# Patient Record
Sex: MALE | Race: WHITE | Employment: FULL TIME | URBAN - METROPOLITAN AREA
[De-identification: names, ages, dates, MRNs, and addresses within clinical notes are randomized per-mention and may not be internally consistent; named-entity substitution may affect disease eponyms.]

---

## 2017-05-31 LAB
CHOLESTEROL, TOTAL: 287 MG/DL
CHOLESTEROL/HDL RATIO: NORMAL
HDLC SERPL-MCNC: 67 MG/DL (ref 35–70)
LDL CHOLESTEROL CALCULATED: 159 MG/DL (ref 0–160)
PROSTATE SPECIFIC ANTIGEN: 0.58 NG/ML
TRIGL SERPL-MCNC: 306 MG/DL
VLDLC SERPL CALC-MCNC: 61 MG/DL

## 2017-06-16 ENCOUNTER — OFFICE VISIT (OUTPATIENT)
Dept: FAMILY MEDICINE CLINIC | Age: 42
End: 2017-06-16

## 2017-06-16 VITALS
WEIGHT: 204.6 LBS | TEMPERATURE: 98.3 F | HEART RATE: 60 BPM | SYSTOLIC BLOOD PRESSURE: 136 MMHG | DIASTOLIC BLOOD PRESSURE: 84 MMHG | BODY MASS INDEX: 30.3 KG/M2 | RESPIRATION RATE: 18 BRPM | HEIGHT: 69 IN

## 2017-06-16 DIAGNOSIS — R00.2 PALPITATIONS: ICD-10-CM

## 2017-06-16 PROCEDURE — G8427 DOCREV CUR MEDS BY ELIG CLIN: HCPCS | Performed by: FAMILY MEDICINE

## 2017-06-16 PROCEDURE — 99213 OFFICE O/P EST LOW 20 MIN: CPT | Performed by: FAMILY MEDICINE

## 2017-06-16 PROCEDURE — 1036F TOBACCO NON-USER: CPT | Performed by: FAMILY MEDICINE

## 2017-06-16 PROCEDURE — G8417 CALC BMI ABV UP PARAM F/U: HCPCS | Performed by: FAMILY MEDICINE

## 2017-06-16 PROCEDURE — 93000 ELECTROCARDIOGRAM COMPLETE: CPT | Performed by: FAMILY MEDICINE

## 2017-06-16 ASSESSMENT — PATIENT HEALTH QUESTIONNAIRE - PHQ9
2. FEELING DOWN, DEPRESSED OR HOPELESS: 0
SUM OF ALL RESPONSES TO PHQ QUESTIONS 1-9: 0
SUM OF ALL RESPONSES TO PHQ9 QUESTIONS 1 & 2: 0
1. LITTLE INTEREST OR PLEASURE IN DOING THINGS: 0

## 2017-06-27 ASSESSMENT — ENCOUNTER SYMPTOMS
ABDOMINAL DISTENTION: 0
SORE THROAT: 0
CONSTIPATION: 0
DIARRHEA: 0
SINUS PRESSURE: 0
SHORTNESS OF BREATH: 0
EYE PAIN: 0
ABDOMINAL PAIN: 0
COUGH: 0
NAUSEA: 0
RHINORRHEA: 0

## 2017-11-22 ENCOUNTER — OFFICE VISIT (OUTPATIENT)
Dept: FAMILY MEDICINE CLINIC | Age: 42
End: 2017-11-22
Payer: COMMERCIAL

## 2017-11-22 VITALS
BODY MASS INDEX: 30.84 KG/M2 | HEART RATE: 92 BPM | DIASTOLIC BLOOD PRESSURE: 84 MMHG | WEIGHT: 208.2 LBS | HEIGHT: 69 IN | SYSTOLIC BLOOD PRESSURE: 134 MMHG | RESPIRATION RATE: 16 BRPM | TEMPERATURE: 99 F

## 2017-11-22 DIAGNOSIS — M77.11 LATERAL EPICONDYLITIS OF RIGHT ELBOW: Primary | ICD-10-CM

## 2017-11-22 PROCEDURE — 1036F TOBACCO NON-USER: CPT | Performed by: FAMILY MEDICINE

## 2017-11-22 PROCEDURE — 96372 THER/PROPH/DIAG INJ SC/IM: CPT | Performed by: FAMILY MEDICINE

## 2017-11-22 PROCEDURE — G8427 DOCREV CUR MEDS BY ELIG CLIN: HCPCS | Performed by: FAMILY MEDICINE

## 2017-11-22 PROCEDURE — 99213 OFFICE O/P EST LOW 20 MIN: CPT | Performed by: FAMILY MEDICINE

## 2017-11-22 PROCEDURE — G8417 CALC BMI ABV UP PARAM F/U: HCPCS | Performed by: FAMILY MEDICINE

## 2017-11-22 PROCEDURE — G8484 FLU IMMUNIZE NO ADMIN: HCPCS | Performed by: FAMILY MEDICINE

## 2017-11-22 RX ORDER — MELOXICAM 15 MG/1
15 TABLET ORAL DAILY
Qty: 30 TABLET | Refills: 1 | Status: SHIPPED | OUTPATIENT
Start: 2017-11-22 | End: 2018-12-18 | Stop reason: ALTCHOICE

## 2017-11-22 RX ORDER — METHYLPREDNISOLONE ACETATE 80 MG/ML
160 INJECTION, SUSPENSION INTRA-ARTICULAR; INTRALESIONAL; INTRAMUSCULAR; SOFT TISSUE ONCE
Status: COMPLETED | OUTPATIENT
Start: 2017-11-22 | End: 2017-11-22

## 2017-11-22 RX ADMIN — METHYLPREDNISOLONE ACETATE 160 MG: 80 INJECTION, SUSPENSION INTRA-ARTICULAR; INTRALESIONAL; INTRAMUSCULAR; SOFT TISSUE at 12:59

## 2017-11-27 ASSESSMENT — ENCOUNTER SYMPTOMS
NAUSEA: 0
DIARRHEA: 0
ABDOMINAL PAIN: 0
ABDOMINAL DISTENTION: 0
CONSTIPATION: 0
EYE PAIN: 0
SINUS PRESSURE: 0
RHINORRHEA: 0
SORE THROAT: 0
COUGH: 0
SHORTNESS OF BREATH: 0

## 2018-06-14 ENCOUNTER — TELEPHONE (OUTPATIENT)
Dept: FAMILY MEDICINE CLINIC | Age: 43
End: 2018-06-14

## 2018-06-14 DIAGNOSIS — L98.9 FACIAL LESION: Primary | ICD-10-CM

## 2018-12-17 ENCOUNTER — TELEPHONE (OUTPATIENT)
Dept: FAMILY MEDICINE CLINIC | Age: 43
End: 2018-12-17

## 2018-12-17 NOTE — TELEPHONE ENCOUNTER
12/17/18  Patient spouse Yasmine Alfredo (on hipaa), requesting appointment with Jean Carlos Johnson only for daily,  lt headaches x 2 weeks that goes up back of neck into forehead/ear. He does get some relief with Aleve. Declined Caitlyn 12/18/18. Please advise.    Thanks/elier  Dolv: 11/22/17

## 2018-12-18 ENCOUNTER — OFFICE VISIT (OUTPATIENT)
Dept: FAMILY MEDICINE CLINIC | Age: 43
End: 2018-12-18
Payer: COMMERCIAL

## 2018-12-18 VITALS
HEART RATE: 64 BPM | DIASTOLIC BLOOD PRESSURE: 100 MMHG | BODY MASS INDEX: 30.57 KG/M2 | WEIGHT: 207 LBS | SYSTOLIC BLOOD PRESSURE: 140 MMHG | RESPIRATION RATE: 12 BRPM | TEMPERATURE: 98.6 F

## 2018-12-18 DIAGNOSIS — I10 ESSENTIAL HYPERTENSION: Primary | ICD-10-CM

## 2018-12-18 PROCEDURE — G8417 CALC BMI ABV UP PARAM F/U: HCPCS | Performed by: FAMILY MEDICINE

## 2018-12-18 PROCEDURE — 99213 OFFICE O/P EST LOW 20 MIN: CPT | Performed by: FAMILY MEDICINE

## 2018-12-18 PROCEDURE — G8427 DOCREV CUR MEDS BY ELIG CLIN: HCPCS | Performed by: FAMILY MEDICINE

## 2018-12-18 PROCEDURE — 1036F TOBACCO NON-USER: CPT | Performed by: FAMILY MEDICINE

## 2018-12-18 PROCEDURE — G8484 FLU IMMUNIZE NO ADMIN: HCPCS | Performed by: FAMILY MEDICINE

## 2018-12-18 RX ORDER — LOSARTAN POTASSIUM AND HYDROCHLOROTHIAZIDE 12.5; 5 MG/1; MG/1
1 TABLET ORAL DAILY
Qty: 30 TABLET | Refills: 5 | Status: SHIPPED | OUTPATIENT
Start: 2018-12-18 | End: 2019-08-26 | Stop reason: SDUPTHER

## 2018-12-20 ASSESSMENT — ENCOUNTER SYMPTOMS
ABDOMINAL PAIN: 0
COUGH: 0
DIARRHEA: 0
RHINORRHEA: 0
ABDOMINAL DISTENTION: 0
NAUSEA: 0
CONSTIPATION: 0
SHORTNESS OF BREATH: 0
SINUS PRESSURE: 0
EYE PAIN: 0
SORE THROAT: 0

## 2018-12-20 NOTE — PROGRESS NOTES
mouth daily 30 tablet 5     No current facility-administered medications for this visit. No Known Allergies  Health Maintenance   Topic Date Due    HIV screen  03/31/1991    DTaP/Tdap/Td vaccine (1 - Tdap) 03/31/1995    Potassium monitoring  07/16/2012    Creatinine monitoring  07/16/2012    Flu vaccine (1) 09/01/2018    Lipid screen  05/31/2022         Objective:     Physical Exam   Constitutional: He is oriented to person, place, and time. He appears well-developed and well-nourished. No distress. HENT:   Head: Normocephalic and atraumatic. Right Ear: External ear normal.   Left Ear: External ear normal.   Eyes: Conjunctivae are normal.   Neck: No JVD present. Cardiovascular: Normal rate, regular rhythm and normal heart sounds. Pulmonary/Chest: Effort normal and breath sounds normal. He has no wheezes. He has no rales. Musculoskeletal: He exhibits no edema or tenderness. Neurological: He is alert and oriented to person, place, and time. Skin: Skin is warm and dry. He is not diaphoretic. No pallor. BP (!) 140/100 Comment: right  Pulse 64   Temp 98.6 °F (37 °C) (Oral)   Resp 12   Wt 207 lb (93.9 kg)   BMI 30.57 kg/m²       Impression/Plan:  1.  Essential hypertension      Requested Prescriptions     Signed Prescriptions Disp Refills    losartan-hydrochlorothiazide (HYZAAR) 50-12.5 MG per tablet 30 tablet 5     Sig: Take 1 tablet by mouth daily     Orders Placed This Encounter   Procedures    Lipid Panel     Standing Status:   Future     Standing Expiration Date:   12/18/2019     Order Specific Question:   Is Patient Fasting?/# of Hours     Answer:   yes/12    Comprehensive Metabolic Panel     Standing Status:   Future     Standing Expiration Date:   12/18/2019    CBC Auto Differential     Standing Status:   Future     Standing Expiration Date:   12/18/2019    TSH without Reflex     Standing Status:   Future     Standing Expiration Date:   12/18/2019    T4, Free     Standing

## 2018-12-27 ENCOUNTER — TELEPHONE (OUTPATIENT)
Dept: FAMILY MEDICINE CLINIC | Age: 43
End: 2018-12-27

## 2018-12-27 DIAGNOSIS — R51.9 ACUTE NONINTRACTABLE HEADACHE, UNSPECIFIED HEADACHE TYPE: Primary | ICD-10-CM

## 2018-12-27 DIAGNOSIS — H53.9 VISION CHANGES: ICD-10-CM

## 2018-12-29 LAB
ABSOLUTE BASO #: 0.1 K/UL (ref 0–0.1)
ABSOLUTE EOS #: 0 K/UL (ref 0.1–0.4)
ABSOLUTE LYMPH #: 1 K/UL (ref 0.8–5.2)
ABSOLUTE MONO #: 0.7 K/UL (ref 0.1–0.9)
ABSOLUTE NEUT #: 3.2 K/UL (ref 1.3–9.1)
ALBUMIN SERPL-MCNC: 5.1 G/DL (ref 3.5–5.2)
ALK PHOSPHATASE: 65 U/L (ref 39–118)
ALT SERPL-CCNC: 60 U/L (ref 5–50)
ANION GAP SERPL CALCULATED.3IONS-SCNC: 17 MEQ/L (ref 10–19)
AST SERPL-CCNC: 43 U/L (ref 9–50)
BASOPHILS RELATIVE PERCENT: 1 %
BILIRUB SERPL-MCNC: 1.3 MG/DL
BUN BLDV-MCNC: 13 MG/DL (ref 8–23)
CALCIUM SERPL-MCNC: 10.9 MG/DL (ref 8.5–10.5)
CHLORIDE BLD-SCNC: 92 MEQ/L (ref 95–107)
CHOLESTEROL/HDL RATIO: 5.2
CHOLESTEROL: 330 MG/DL
CO2: 24 MEQ/L (ref 19–31)
CREAT SERPL-MCNC: 1.1 MG/DL (ref 0.8–1.4)
EGFR AFRICAN AMERICAN: 95.5 ML/MIN/1.73 M2
EGFR IF NONAFRICAN AMERICAN: 82.4 ML/MIN/1.73 M2
EOSINOPHILS RELATIVE PERCENT: 0.8 %
GLUCOSE: 126 MG/DL (ref 70–99)
HCT VFR BLD CALC: 47.9 % (ref 41.4–51)
HDLC SERPL-MCNC: 62.9 MG/DL
HEMOGLOBIN: 17.3 G/DL (ref 13.8–17)
LDL CHOLESTEROL CALCULATED: 201 MG/DL
LDL/HDL RATIO: 3.2
LYMPHOCYTE %: 19.9 %
MCH RBC QN AUTO: 31.1 PG (ref 27–34)
MCHC RBC AUTO-ENTMCNC: 36.1 G/DL (ref 31–36)
MCV RBC AUTO: 86 FL (ref 80–100)
MONOCYTES # BLD: 14.1 %
NEUTROPHILS RELATIVE PERCENT: 64.2 %
PDW BLD-RTO: 12.6 % (ref 10.8–14.8)
PLATELETS: 267 K/UL (ref 150–450)
POTASSIUM SERPL-SCNC: 4.5 MEQ/L (ref 3.5–5.4)
RBC: 5.57 M/UL (ref 4–5.5)
SODIUM BLD-SCNC: 133 MEQ/L (ref 135–146)
T4 FREE: 1.17 NG/DL (ref 0.8–1.9)
TOTAL PROTEIN: 8.4 G/DL (ref 6.1–8.3)
TRIGL SERPL-MCNC: 329 MG/DL
TSH SERPL DL<=0.05 MIU/L-ACNC: 8.43 UIU/ML (ref 0.4–4.1)
VLDLC SERPL CALC-MCNC: 66 MG/DL
WBC: 5 K/UL (ref 3.7–10.8)

## 2019-01-03 ENCOUNTER — HOSPITAL ENCOUNTER (OUTPATIENT)
Dept: MRI IMAGING | Age: 44
Discharge: HOME | End: 2019-01-03
Payer: COMMERCIAL

## 2019-01-03 DIAGNOSIS — H53.9 VISION CHANGES: ICD-10-CM

## 2019-01-03 DIAGNOSIS — R51.9 ACUTE NONINTRACTABLE HEADACHE, UNSPECIFIED HEADACHE TYPE: ICD-10-CM

## 2019-01-03 PROCEDURE — 70551 MRI BRAIN STEM W/O DYE: CPT

## 2019-01-09 ENCOUNTER — TELEPHONE (OUTPATIENT)
Dept: FAMILY MEDICINE CLINIC | Age: 44
End: 2019-01-09

## 2019-01-09 DIAGNOSIS — E78.00 ELEVATED CHOLESTEROL: Primary | ICD-10-CM

## 2019-01-09 DIAGNOSIS — R79.89 ELEVATED TSH: ICD-10-CM

## 2019-01-10 RX ORDER — ATORVASTATIN CALCIUM 20 MG/1
20 TABLET, FILM COATED ORAL DAILY
Qty: 30 TABLET | Refills: 3 | Status: SHIPPED | OUTPATIENT
Start: 2019-01-10 | End: 2019-07-19 | Stop reason: DRUGHIGH

## 2019-01-10 RX ORDER — LEVOTHYROXINE SODIUM 0.05 MG/1
50 TABLET ORAL DAILY
Qty: 30 TABLET | Refills: 3 | Status: SHIPPED | OUTPATIENT
Start: 2019-01-10 | End: 2019-07-01 | Stop reason: SDUPTHER

## 2019-03-04 RX ORDER — AZITHROMYCIN 500 MG/1
500 TABLET, FILM COATED ORAL DAILY
Qty: 1 PACKET | Refills: 0 | Status: SHIPPED | OUTPATIENT
Start: 2019-03-04 | End: 2019-03-07

## 2019-07-01 DIAGNOSIS — E78.5 HYPERLIPIDEMIA, UNSPECIFIED HYPERLIPIDEMIA TYPE: ICD-10-CM

## 2019-07-01 DIAGNOSIS — R79.89 ELEVATED TSH: ICD-10-CM

## 2019-07-01 DIAGNOSIS — E03.9 HYPOTHYROIDISM, UNSPECIFIED TYPE: Primary | ICD-10-CM

## 2019-07-01 RX ORDER — LEVOTHYROXINE SODIUM 0.05 MG/1
50 TABLET ORAL DAILY
Qty: 30 TABLET | Refills: 3 | Status: SHIPPED | OUTPATIENT
Start: 2019-07-01 | End: 2019-08-26 | Stop reason: SDUPTHER

## 2019-07-02 RX ORDER — LEVOTHYROXINE SODIUM 0.05 MG/1
TABLET ORAL
Qty: 30 TABLET | Refills: 3 | OUTPATIENT
Start: 2019-07-02

## 2019-07-04 LAB
ALBUMIN SERPL-MCNC: 4.4 G/DL (ref 3.2–5.3)
ALK PHOSPHATASE: 48 U/L (ref 39–130)
ALT SERPL-CCNC: 113 U/L (ref 0–40)
ANION GAP SERPL CALCULATED.3IONS-SCNC: 8 MMOL/L (ref 4–12)
AST SERPL-CCNC: 66 U/L (ref 0–41)
BILIRUB SERPL-MCNC: 0.5 MG/DL (ref 0.3–1.2)
BUN BLDV-MCNC: 10 MG/DL (ref 5–23)
CALCIUM SERPL-MCNC: 10.1 MG/DL (ref 8.5–10.5)
CHLORIDE BLD-SCNC: 106 MMOL/L (ref 98–109)
CHOLESTEROL/HDL RATIO: 5.8 (ref 1–5)
CHOLESTEROL: 267 MG/DL (ref 150–200)
CO2: 28 MMOL/L (ref 22–32)
CREAT SERPL-MCNC: 0.92 MG/DL (ref 0.6–1.3)
EGFR AFRICAN AMERICAN: >60 ML/MIN/1.73SQ.M
EGFR IF NONAFRICAN AMERICAN: >60 ML/MIN/1.73SQ.M
GLUCOSE: 98 MG/DL (ref 65–99)
HDLC SERPL-MCNC: 46 MG/DL
LDL CHOLESTEROL CALCULATED: 152 MG/DL
LDL/HDL RATIO: 3.3
POTASSIUM SERPL-SCNC: 4.5 MMOL/L (ref 3.5–5)
SODIUM BLD-SCNC: 142 MMOL/L (ref 134–146)
T4 FREE: 0.67 NG/DL (ref 0.61–1.6)
TOTAL PROTEIN: 7.6 G/DL (ref 6–8)
TRIGL SERPL-MCNC: 346 MG/DL (ref 27–150)
TSH SERPL DL<=0.05 MIU/L-ACNC: 2.74 UIU/ML (ref 0.49–4.67)
VLDLC SERPL CALC-MCNC: 69 MG/DL (ref 0–30)

## 2019-07-15 ENCOUNTER — TELEPHONE (OUTPATIENT)
Dept: FAMILY MEDICINE CLINIC | Age: 44
End: 2019-07-15

## 2019-07-15 DIAGNOSIS — E78.00 ELEVATED CHOLESTEROL: Primary | ICD-10-CM

## 2019-07-19 RX ORDER — ATORVASTATIN CALCIUM 40 MG/1
40 TABLET, FILM COATED ORAL DAILY
Qty: 90 TABLET | Refills: 3 | Status: SHIPPED | OUTPATIENT
Start: 2019-07-19 | End: 2019-08-26 | Stop reason: SINTOL

## 2019-07-19 NOTE — TELEPHONE ENCOUNTER
VM left with results and recommendations. Med sent to [facility] in [address] and lab orders to his home.  Notified to call back with any questions or concerns

## 2019-08-26 ENCOUNTER — OFFICE VISIT (OUTPATIENT)
Dept: FAMILY MEDICINE CLINIC | Age: 44
End: 2019-08-26
Payer: COMMERCIAL

## 2019-08-26 VITALS
TEMPERATURE: 98.7 F | HEART RATE: 80 BPM | WEIGHT: 206 LBS | SYSTOLIC BLOOD PRESSURE: 160 MMHG | BODY MASS INDEX: 29.49 KG/M2 | DIASTOLIC BLOOD PRESSURE: 96 MMHG | RESPIRATION RATE: 16 BRPM | HEIGHT: 70 IN

## 2019-08-26 DIAGNOSIS — I10 ESSENTIAL HYPERTENSION: ICD-10-CM

## 2019-08-26 DIAGNOSIS — R79.89 ELEVATED TSH: ICD-10-CM

## 2019-08-26 DIAGNOSIS — M62.830 LUMBAR PARASPINAL MUSCLE SPASM: ICD-10-CM

## 2019-08-26 DIAGNOSIS — S39.012A ACUTE MYOFASCIAL STRAIN OF LUMBAR REGION, INITIAL ENCOUNTER: Primary | ICD-10-CM

## 2019-08-26 PROCEDURE — 1036F TOBACCO NON-USER: CPT | Performed by: FAMILY MEDICINE

## 2019-08-26 PROCEDURE — 96372 THER/PROPH/DIAG INJ SC/IM: CPT | Performed by: FAMILY MEDICINE

## 2019-08-26 PROCEDURE — G8427 DOCREV CUR MEDS BY ELIG CLIN: HCPCS | Performed by: FAMILY MEDICINE

## 2019-08-26 PROCEDURE — 99213 OFFICE O/P EST LOW 20 MIN: CPT | Performed by: FAMILY MEDICINE

## 2019-08-26 PROCEDURE — G8417 CALC BMI ABV UP PARAM F/U: HCPCS | Performed by: FAMILY MEDICINE

## 2019-08-26 RX ORDER — METHYLPREDNISOLONE ACETATE 80 MG/ML
160 INJECTION, SUSPENSION INTRA-ARTICULAR; INTRALESIONAL; INTRAMUSCULAR; SOFT TISSUE ONCE
Status: COMPLETED | OUTPATIENT
Start: 2019-08-26 | End: 2019-08-26

## 2019-08-26 RX ORDER — CYCLOBENZAPRINE HCL 10 MG
10 TABLET ORAL NIGHTLY PRN
Qty: 30 TABLET | Refills: 0 | Status: SHIPPED | OUTPATIENT
Start: 2019-08-26 | End: 2019-09-05

## 2019-08-26 RX ORDER — LEVOTHYROXINE SODIUM 0.05 MG/1
50 TABLET ORAL DAILY
Qty: 90 TABLET | Refills: 3 | Status: SHIPPED | OUTPATIENT
Start: 2019-08-26 | End: 2020-08-31

## 2019-08-26 RX ORDER — LOSARTAN POTASSIUM AND HYDROCHLOROTHIAZIDE 12.5; 5 MG/1; MG/1
1 TABLET ORAL DAILY
Qty: 90 TABLET | Refills: 3 | Status: SHIPPED | OUTPATIENT
Start: 2019-08-26 | End: 2020-09-03

## 2019-08-26 RX ADMIN — METHYLPREDNISOLONE ACETATE 160 MG: 80 INJECTION, SUSPENSION INTRA-ARTICULAR; INTRALESIONAL; INTRAMUSCULAR; SOFT TISSUE at 15:14

## 2019-08-26 ASSESSMENT — ENCOUNTER SYMPTOMS
SORE THROAT: 0
SHORTNESS OF BREATH: 0
NAUSEA: 0
BACK PAIN: 1
ABDOMINAL DISTENTION: 0
COUGH: 0
ABDOMINAL PAIN: 0
CONSTIPATION: 0
RHINORRHEA: 0
DIARRHEA: 0
EYE PAIN: 0
SINUS PRESSURE: 0

## 2019-08-26 ASSESSMENT — PATIENT HEALTH QUESTIONNAIRE - PHQ9
SUM OF ALL RESPONSES TO PHQ QUESTIONS 1-9: 0
SUM OF ALL RESPONSES TO PHQ9 QUESTIONS 1 & 2: 0
1. LITTLE INTEREST OR PLEASURE IN DOING THINGS: 0
SUM OF ALL RESPONSES TO PHQ QUESTIONS 1-9: 0
2. FEELING DOWN, DEPRESSED OR HOPELESS: 0

## 2019-08-26 NOTE — PROGRESS NOTES
Smoker    Smokeless tobacco: Never Used   Substance Use Topics    Alcohol use: Yes      Current Outpatient Medications   Medication Sig Dispense Refill    losartan-hydrochlorothiazide (HYZAAR) 50-12.5 MG per tablet Take 1 tablet by mouth daily 90 tablet 3    levothyroxine (SYNTHROID) 50 MCG tablet Take 1 tablet by mouth Daily 90 tablet 3    cyclobenzaprine (FLEXERIL) 10 MG tablet Take 1 tablet by mouth nightly as needed for Muscle spasms 30 tablet 0     No current facility-administered medications for this visit. No Known Allergies  Health Maintenance   Topic Date Due    HIV screen  03/31/1991    DTaP/Tdap/Td vaccine (1 - Tdap) 03/31/1995    Flu vaccine (1) 09/01/2019    Potassium monitoring  07/03/2020    Creatinine monitoring  07/03/2020    Lipid screen  07/03/2024    Pneumococcal 0-64 years Vaccine  Aged Out         Objective:     Physical Exam   Constitutional: He is oriented to person, place, and time. He appears well-developed and well-nourished. No distress. HENT:   Head: Normocephalic and atraumatic. Right Ear: External ear normal.   Left Ear: External ear normal.   Eyes: Conjunctivae are normal.   Neck: No JVD present. Cardiovascular: Normal rate, regular rhythm and normal heart sounds. Pulmonary/Chest: Effort normal and breath sounds normal. He has no wheezes. He has no rales. Musculoskeletal: He exhibits tenderness. He exhibits no edema. Decreased range of motion lumbar spine examination of the hips reveals full active and passive range of motion without significant discomfort   Neurological: He is alert and oriented to person, place, and time. Skin: Skin is warm and dry. He is not diaphoretic. No pallor. BP (!) 160/96 (Site: Left Upper Arm)   Pulse 80   Temp 98.7 °F (37.1 °C) (Oral)   Resp 16   Ht 5' 9.5\" (1.765 m)   Wt 206 lb (93.4 kg)   BMI 29.98 kg/m²       Impression/Plan:  1. Acute myofascial strain of lumbar region, initial encounter    2.  Essential hypertension    3. Elevated TSH    4. Lumbar paraspinal muscle spasm      Requested Prescriptions     Signed Prescriptions Disp Refills    losartan-hydrochlorothiazide (HYZAAR) 50-12.5 MG per tablet 90 tablet 3     Sig: Take 1 tablet by mouth daily    levothyroxine (SYNTHROID) 50 MCG tablet 90 tablet 3     Sig: Take 1 tablet by mouth Daily    cyclobenzaprine (FLEXERIL) 10 MG tablet 30 tablet 0     Sig: Take 1 tablet by mouth nightly as needed for Muscle spasms     No orders of the defined types were placed in this encounter. Methylprednisolone 160 IM. Counseled on need for compliance as he is been off his medications for some time    Patient giveneducational materials - see patient instructions. Discussed use, benefit, and side effects of prescribed medications. All patient questions answered. Pt voiced understanding. Reviewed health maintenance. Patient agreedwith treatment plan. Follow up as directed. **This report has been created using voice recognition software. It may contain minor errorswhich are inherent in voice recognition technology. **       Electronically signed by Jaja Moody MD on 8/26/2019 at 5:44 PM

## 2019-11-22 ENCOUNTER — APPOINTMENT (OUTPATIENT)
Dept: GENERAL RADIOLOGY | Age: 44
DRG: 246 | End: 2019-11-22
Payer: COMMERCIAL

## 2019-11-22 ENCOUNTER — HOSPITAL ENCOUNTER (INPATIENT)
Age: 44
LOS: 2 days | Discharge: HOME | DRG: 246 | End: 2019-11-24
Attending: EMERGENCY MEDICINE | Admitting: INTERNAL MEDICINE
Payer: COMMERCIAL

## 2019-11-22 DIAGNOSIS — I21.11 ACUTE ST ELEVATION MYOCARDIAL INFARCTION (STEMI) INVOLVING RIGHT CORONARY ARTERY (HCC): Primary | ICD-10-CM

## 2019-11-22 PROBLEM — I21.3 STEMI (ST ELEVATION MYOCARDIAL INFARCTION) (HCC): Status: ACTIVE | Noted: 2019-11-22

## 2019-11-22 LAB
ALBUMIN SERPL-MCNC: 4.7 G/DL (ref 3.5–5.1)
ALP BLD-CCNC: 57 U/L (ref 38–126)
ALT SERPL-CCNC: 60 U/L (ref 11–66)
AMPHETAMINE+METHAMPHETAMINE URINE SCREEN: NEGATIVE
ANION GAP SERPL CALCULATED.3IONS-SCNC: 20 MEQ/L (ref 8–16)
APTT: 37.3 SECONDS (ref 22–38)
AST SERPL-CCNC: 43 U/L (ref 5–40)
BARBITURATE QUANTITATIVE URINE: NEGATIVE
BENZODIAZEPINE QUANTITATIVE URINE: POSITIVE
BILIRUB SERPL-MCNC: 0.3 MG/DL (ref 0.3–1.2)
BUN BLDV-MCNC: 11 MG/DL (ref 7–22)
CALCIUM SERPL-MCNC: 10 MG/DL (ref 8.5–10.5)
CANNABINOID QUANTITATIVE URINE: NEGATIVE
CHLORIDE BLD-SCNC: 92 MEQ/L (ref 98–111)
CHOLESTEROL, TOTAL: 267 MG/DL (ref 100–199)
CO2: 22 MEQ/L (ref 23–33)
COCAINE METABOLITE QUANTITATIVE URINE: NEGATIVE
CREAT SERPL-MCNC: 0.7 MG/DL (ref 0.4–1.2)
EKG ATRIAL RATE: 112 BPM
EKG P AXIS: 50 DEGREES
EKG P-R INTERVAL: 198 MS
EKG Q-T INTERVAL: 334 MS
EKG QRS DURATION: 102 MS
EKG QTC CALCULATION (BAZETT): 455 MS
EKG R AXIS: 54 DEGREES
EKG T AXIS: 67 DEGREES
EKG VENTRICULAR RATE: 112 BPM
ERYTHROCYTE [DISTWIDTH] IN BLOOD BY AUTOMATED COUNT: 11.8 % (ref 11.5–14.5)
ERYTHROCYTE [DISTWIDTH] IN BLOOD BY AUTOMATED COUNT: 38.7 FL (ref 35–45)
GFR SERPL CREATININE-BSD FRML MDRD: > 90 ML/MIN/1.73M2
GLUCOSE BLD-MCNC: 112 MG/DL (ref 70–108)
HCT VFR BLD CALC: 44.3 % (ref 42–52)
HDLC SERPL-MCNC: 51 MG/DL
HEMOGLOBIN: 15.7 GM/DL (ref 14–18)
INR BLD: 0.92 (ref 0.85–1.13)
LDL CHOLESTEROL CALCULATED: 140 MG/DL
LIPASE: 25.5 U/L (ref 5.6–51.3)
LV EF: 58 %
LVEF MODALITY: NORMAL
MCH RBC QN AUTO: 31.8 PG (ref 26–33)
MCHC RBC AUTO-ENTMCNC: 35.4 GM/DL (ref 32.2–35.5)
MCV RBC AUTO: 89.9 FL (ref 80–94)
MRSA SCREEN RT-PCR: NEGATIVE
OPIATES, URINE: NEGATIVE
OSMOLALITY CALCULATION: 268.4 MOSMOL/KG (ref 275–300)
OXYCODONE: NEGATIVE
PHENCYCLIDINE QUANTITATIVE URINE: NEGATIVE
PLATELET # BLD: 235 THOU/MM3 (ref 130–400)
PMV BLD AUTO: 9.5 FL (ref 9.4–12.4)
POTASSIUM SERPL-SCNC: 3.8 MEQ/L (ref 3.5–5.2)
PRO-BNP: 7.2 PG/ML (ref 0–450)
RBC # BLD: 4.93 MILL/MM3 (ref 4.7–6.1)
SODIUM BLD-SCNC: 134 MEQ/L (ref 135–145)
TOTAL PROTEIN: 8 G/DL (ref 6.1–8)
TRIGL SERPL-MCNC: 380 MG/DL (ref 0–199)
TROPONIN T: 0.03 NG/ML
VANCOMYCIN RESISTANT ENTEROCOCCUS: NEGATIVE
WBC # BLD: 9.4 THOU/MM3 (ref 4.8–10.8)

## 2019-11-22 PROCEDURE — 99024 POSTOP FOLLOW-UP VISIT: CPT | Performed by: NURSE PRACTITIONER

## 2019-11-22 PROCEDURE — 93454 CORONARY ARTERY ANGIO S&I: CPT | Performed by: INTERNAL MEDICINE

## 2019-11-22 PROCEDURE — 92941 PRQ TRLML REVSC TOT OCCL AMI: CPT | Performed by: INTERNAL MEDICINE

## 2019-11-22 PROCEDURE — 85730 THROMBOPLASTIN TIME PARTIAL: CPT

## 2019-11-22 PROCEDURE — 2580000003 HC RX 258: Performed by: INTERNAL MEDICINE

## 2019-11-22 PROCEDURE — 6360000002 HC RX W HCPCS: Performed by: INTERNAL MEDICINE

## 2019-11-22 PROCEDURE — 2500000003 HC RX 250 WO HCPCS

## 2019-11-22 PROCEDURE — B2111ZZ FLUOROSCOPY OF MULTIPLE CORONARY ARTERIES USING LOW OSMOLAR CONTRAST: ICD-10-PCS | Performed by: INTERNAL MEDICINE

## 2019-11-22 PROCEDURE — 2500000003 HC RX 250 WO HCPCS: Performed by: EMERGENCY MEDICINE

## 2019-11-22 PROCEDURE — 87500 VANOMYCIN DNA AMP PROBE: CPT

## 2019-11-22 PROCEDURE — 80061 LIPID PANEL: CPT

## 2019-11-22 PROCEDURE — 92929 HC PRQ CARD STENT W/ANGIO ADDL: CPT | Performed by: INTERNAL MEDICINE

## 2019-11-22 PROCEDURE — 94761 N-INVAS EAR/PLS OXIMETRY MLT: CPT

## 2019-11-22 PROCEDURE — 6360000004 HC RX CONTRAST MEDICATION: Performed by: EMERGENCY MEDICINE

## 2019-11-22 PROCEDURE — C1725 CATH, TRANSLUMIN NON-LASER: HCPCS

## 2019-11-22 PROCEDURE — 83880 ASSAY OF NATRIURETIC PEPTIDE: CPT

## 2019-11-22 PROCEDURE — 84484 ASSAY OF TROPONIN QUANT: CPT

## 2019-11-22 PROCEDURE — 99285 EMERGENCY DEPT VISIT HI MDM: CPT

## 2019-11-22 PROCEDURE — C1894 INTRO/SHEATH, NON-LASER: HCPCS

## 2019-11-22 PROCEDURE — 87641 MR-STAPH DNA AMP PROBE: CPT

## 2019-11-22 PROCEDURE — 2709999900 HC NON-CHARGEABLE SUPPLY

## 2019-11-22 PROCEDURE — C1887 CATHETER, GUIDING: HCPCS

## 2019-11-22 PROCEDURE — 93005 ELECTROCARDIOGRAM TRACING: CPT | Performed by: EMERGENCY MEDICINE

## 2019-11-22 PROCEDURE — 6370000000 HC RX 637 (ALT 250 FOR IP): Performed by: INTERNAL MEDICINE

## 2019-11-22 PROCEDURE — 96375 TX/PRO/DX INJ NEW DRUG ADDON: CPT

## 2019-11-22 PROCEDURE — 6370000000 HC RX 637 (ALT 250 FOR IP)

## 2019-11-22 PROCEDURE — C1874 STENT, COATED/COV W/DEL SYS: HCPCS

## 2019-11-22 PROCEDURE — 4A023N7 MEASUREMENT OF CARDIAC SAMPLING AND PRESSURE, LEFT HEART, PERCUTANEOUS APPROACH: ICD-10-PCS | Performed by: INTERNAL MEDICINE

## 2019-11-22 PROCEDURE — 2500000003 HC RX 250 WO HCPCS: Performed by: INTERNAL MEDICINE

## 2019-11-22 PROCEDURE — 71045 X-RAY EXAM CHEST 1 VIEW: CPT

## 2019-11-22 PROCEDURE — 80307 DRUG TEST PRSMV CHEM ANLYZR: CPT

## 2019-11-22 PROCEDURE — C1769 GUIDE WIRE: HCPCS

## 2019-11-22 PROCEDURE — 96374 THER/PROPH/DIAG INJ IV PUSH: CPT

## 2019-11-22 PROCEDURE — 83690 ASSAY OF LIPASE: CPT

## 2019-11-22 PROCEDURE — 36415 COLL VENOUS BLD VENIPUNCTURE: CPT

## 2019-11-22 PROCEDURE — 85610 PROTHROMBIN TIME: CPT

## 2019-11-22 PROCEDURE — 2140000000 HC CCU INTERMEDIATE R&B

## 2019-11-22 PROCEDURE — 99291 CRITICAL CARE FIRST HOUR: CPT | Performed by: INTERNAL MEDICINE

## 2019-11-22 PROCEDURE — 93005 ELECTROCARDIOGRAM TRACING: CPT | Performed by: INTERNAL MEDICINE

## 2019-11-22 PROCEDURE — 6360000002 HC RX W HCPCS: Performed by: EMERGENCY MEDICINE

## 2019-11-22 PROCEDURE — 6370000000 HC RX 637 (ALT 250 FOR IP): Performed by: EMERGENCY MEDICINE

## 2019-11-22 PROCEDURE — 93306 TTE W/DOPPLER COMPLETE: CPT

## 2019-11-22 PROCEDURE — 6360000002 HC RX W HCPCS

## 2019-11-22 PROCEDURE — 87081 CULTURE SCREEN ONLY: CPT

## 2019-11-22 PROCEDURE — 80053 COMPREHEN METABOLIC PANEL: CPT

## 2019-11-22 PROCEDURE — 027036Z DILATION OF CORONARY ARTERY, ONE ARTERY WITH THREE DRUG-ELUTING INTRALUMINAL DEVICES, PERCUTANEOUS APPROACH: ICD-10-PCS | Performed by: INTERNAL MEDICINE

## 2019-11-22 PROCEDURE — 85027 COMPLETE CBC AUTOMATED: CPT

## 2019-11-22 RX ORDER — NITROGLYCERIN 20 MG/100ML
5 INJECTION INTRAVENOUS CONTINUOUS
Status: DISCONTINUED | OUTPATIENT
Start: 2019-11-22 | End: 2019-11-22 | Stop reason: DRUGHIGH

## 2019-11-22 RX ORDER — HEPARIN SODIUM 1000 [USP'U]/ML
4000 INJECTION, SOLUTION INTRAVENOUS; SUBCUTANEOUS ONCE
Status: COMPLETED | OUTPATIENT
Start: 2019-11-22 | End: 2019-11-22

## 2019-11-22 RX ORDER — ASPIRIN 81 MG/1
81 TABLET, CHEWABLE ORAL DAILY
Status: DISCONTINUED | OUTPATIENT
Start: 2019-11-23 | End: 2019-11-24 | Stop reason: HOSPADM

## 2019-11-22 RX ORDER — CARVEDILOL 3.12 MG/1
3.12 TABLET ORAL 2 TIMES DAILY WITH MEALS
Status: DISCONTINUED | OUTPATIENT
Start: 2019-11-22 | End: 2019-11-24 | Stop reason: HOSPADM

## 2019-11-22 RX ORDER — NITROGLYCERIN 20 MG/100ML
INJECTION INTRAVENOUS CONTINUOUS PRN
Status: COMPLETED | OUTPATIENT
Start: 2019-11-22 | End: 2019-11-22

## 2019-11-22 RX ORDER — NITROGLYCERIN 20 MG/100ML
5 INJECTION INTRAVENOUS CONTINUOUS
Status: DISCONTINUED | OUTPATIENT
Start: 2019-11-22 | End: 2019-11-22

## 2019-11-22 RX ORDER — MORPHINE SULFATE 2 MG/ML
2 INJECTION, SOLUTION INTRAMUSCULAR; INTRAVENOUS ONCE
Status: DISCONTINUED | OUTPATIENT
Start: 2019-11-22 | End: 2019-11-24 | Stop reason: HOSPADM

## 2019-11-22 RX ORDER — ATORVASTATIN CALCIUM 80 MG/1
80 TABLET, FILM COATED ORAL NIGHTLY
Status: DISCONTINUED | OUTPATIENT
Start: 2019-11-22 | End: 2019-11-24 | Stop reason: HOSPADM

## 2019-11-22 RX ORDER — NITROGLYCERIN 20 MG/100ML
5 INJECTION INTRAVENOUS CONTINUOUS
Status: DISCONTINUED | OUTPATIENT
Start: 2019-11-22 | End: 2019-11-24 | Stop reason: HOSPADM

## 2019-11-22 RX ORDER — HEPARIN SODIUM 1000 [USP'U]/ML
4000 INJECTION, SOLUTION INTRAVENOUS; SUBCUTANEOUS PRN
Status: DISCONTINUED | OUTPATIENT
Start: 2019-11-22 | End: 2019-11-22

## 2019-11-22 RX ORDER — SODIUM CHLORIDE 0.9 % (FLUSH) 0.9 %
10 SYRINGE (ML) INJECTION EVERY 12 HOURS SCHEDULED
Status: DISCONTINUED | OUTPATIENT
Start: 2019-11-22 | End: 2019-11-23 | Stop reason: SDUPTHER

## 2019-11-22 RX ORDER — SODIUM CHLORIDE 0.9 % (FLUSH) 0.9 %
10 SYRINGE (ML) INJECTION PRN
Status: DISCONTINUED | OUTPATIENT
Start: 2019-11-22 | End: 2019-11-23 | Stop reason: SDUPTHER

## 2019-11-22 RX ORDER — ACETAMINOPHEN 325 MG/1
650 TABLET ORAL EVERY 4 HOURS PRN
Status: DISCONTINUED | OUTPATIENT
Start: 2019-11-22 | End: 2019-11-23 | Stop reason: SDUPTHER

## 2019-11-22 RX ORDER — LISINOPRIL 5 MG/1
5 TABLET ORAL DAILY
Status: DISCONTINUED | OUTPATIENT
Start: 2019-11-22 | End: 2019-11-24 | Stop reason: HOSPADM

## 2019-11-22 RX ORDER — ALPRAZOLAM 0.25 MG/1
0.25 TABLET ORAL EVERY 6 HOURS PRN
Status: DISCONTINUED | OUTPATIENT
Start: 2019-11-22 | End: 2019-11-24 | Stop reason: HOSPADM

## 2019-11-22 RX ORDER — HYDRALAZINE HYDROCHLORIDE 20 MG/ML
10 INJECTION INTRAMUSCULAR; INTRAVENOUS EVERY 10 MIN PRN
Status: DISCONTINUED | OUTPATIENT
Start: 2019-11-22 | End: 2019-11-24 | Stop reason: HOSPADM

## 2019-11-22 RX ORDER — HEPARIN SODIUM 1000 [USP'U]/ML
2000 INJECTION, SOLUTION INTRAVENOUS; SUBCUTANEOUS PRN
Status: DISCONTINUED | OUTPATIENT
Start: 2019-11-22 | End: 2019-11-22

## 2019-11-22 RX ORDER — METOPROLOL TARTRATE 5 MG/5ML
5 INJECTION INTRAVENOUS ONCE
Status: COMPLETED | OUTPATIENT
Start: 2019-11-22 | End: 2019-11-22

## 2019-11-22 RX ORDER — ASPIRIN 81 MG/1
324 TABLET, CHEWABLE ORAL ONCE
Status: DISCONTINUED | OUTPATIENT
Start: 2019-11-22 | End: 2019-11-24 | Stop reason: HOSPADM

## 2019-11-22 RX ORDER — MORPHINE SULFATE 2 MG/ML
2 INJECTION, SOLUTION INTRAMUSCULAR; INTRAVENOUS
Status: DISCONTINUED | OUTPATIENT
Start: 2019-11-22 | End: 2019-11-24 | Stop reason: HOSPADM

## 2019-11-22 RX ORDER — HEPARIN SODIUM 10000 [USP'U]/100ML
11 INJECTION, SOLUTION INTRAVENOUS CONTINUOUS
Status: DISCONTINUED | OUTPATIENT
Start: 2019-11-22 | End: 2019-11-22

## 2019-11-22 RX ORDER — ASPIRIN 81 MG/1
TABLET, CHEWABLE ORAL DAILY PRN
Status: COMPLETED | OUTPATIENT
Start: 2019-11-22 | End: 2019-11-22

## 2019-11-22 RX ORDER — MORPHINE SULFATE 2 MG/ML
INJECTION, SOLUTION INTRAMUSCULAR; INTRAVENOUS DAILY PRN
Status: COMPLETED | OUTPATIENT
Start: 2019-11-22 | End: 2019-11-22

## 2019-11-22 RX ADMIN — CARVEDILOL 3.12 MG: 3.12 TABLET, FILM COATED ORAL at 08:51

## 2019-11-22 RX ADMIN — ACETAMINOPHEN 650 MG: 325 TABLET ORAL at 09:46

## 2019-11-22 RX ADMIN — HEPARIN SODIUM 1000 UNITS/HR: 10000 INJECTION, SOLUTION INTRAVENOUS at 05:09

## 2019-11-22 RX ADMIN — SODIUM CHLORIDE, PRESERVATIVE FREE 10 ML: 5 INJECTION INTRAVENOUS at 20:06

## 2019-11-22 RX ADMIN — ATORVASTATIN CALCIUM 80 MG: 80 TABLET, FILM COATED ORAL at 20:06

## 2019-11-22 RX ADMIN — HEPARIN SODIUM 4000 UNITS: 1000 INJECTION INTRAVENOUS; SUBCUTANEOUS at 05:08

## 2019-11-22 RX ADMIN — ASPIRIN 324 MG: 81 TABLET, CHEWABLE ORAL at 05:03

## 2019-11-22 RX ADMIN — SODIUM CHLORIDE, PRESERVATIVE FREE 10 ML: 5 INJECTION INTRAVENOUS at 08:00

## 2019-11-22 RX ADMIN — CARVEDILOL 3.12 MG: 3.12 TABLET, FILM COATED ORAL at 18:33

## 2019-11-22 RX ADMIN — METOPROLOL TARTRATE 5 MG: 5 INJECTION INTRAVENOUS at 05:12

## 2019-11-22 RX ADMIN — ACETAMINOPHEN 650 MG: 325 TABLET ORAL at 18:33

## 2019-11-22 RX ADMIN — NITROGLYCERIN 5 MCG/MIN: 20 INJECTION INTRAVENOUS at 05:02

## 2019-11-22 RX ADMIN — MORPHINE SULFATE 2 MG: 2 INJECTION, SOLUTION INTRAMUSCULAR; INTRAVENOUS at 14:36

## 2019-11-22 RX ADMIN — TICAGRELOR 90 MG: 90 TABLET ORAL at 20:06

## 2019-11-22 RX ADMIN — IOPAMIDOL 100 ML: 755 INJECTION, SOLUTION INTRAVENOUS at 06:42

## 2019-11-22 RX ADMIN — ALPRAZOLAM 0.25 MG: 0.25 TABLET ORAL at 09:46

## 2019-11-22 RX ADMIN — MORPHINE SULFATE 2 MG: 2 INJECTION, SOLUTION INTRAMUSCULAR; INTRAVENOUS at 05:05

## 2019-11-22 RX ADMIN — NITROGLYCERIN 5 MCG/MIN: 20 INJECTION INTRAVENOUS at 14:35

## 2019-11-22 RX ADMIN — LISINOPRIL 5 MG: 5 TABLET ORAL at 14:15

## 2019-11-22 ASSESSMENT — ENCOUNTER SYMPTOMS
VOMITING: 0
BACK PAIN: 0
PHOTOPHOBIA: 0
RHINORRHEA: 0
ABDOMINAL PAIN: 0
WHEEZING: 0
ABDOMINAL DISTENTION: 0
CHEST TIGHTNESS: 0
EYE PAIN: 0
EYE ITCHING: 0
SORE THROAT: 0
EYE REDNESS: 0
DIARRHEA: 0
STRIDOR: 0
EYE DISCHARGE: 0
CONSTIPATION: 0
NAUSEA: 0
SHORTNESS OF BREATH: 0
COUGH: 0

## 2019-11-22 ASSESSMENT — PAIN DESCRIPTION - ONSET: ONSET: ON-GOING

## 2019-11-22 ASSESSMENT — PAIN SCALES - GENERAL
PAINLEVEL_OUTOF10: 6
PAINLEVEL_OUTOF10: 3
PAINLEVEL_OUTOF10: 0
PAINLEVEL_OUTOF10: 7
PAINLEVEL_OUTOF10: 2
PAINLEVEL_OUTOF10: 6

## 2019-11-22 ASSESSMENT — PAIN - FUNCTIONAL ASSESSMENT: PAIN_FUNCTIONAL_ASSESSMENT: ACTIVITIES ARE NOT PREVENTED

## 2019-11-22 ASSESSMENT — PAIN DESCRIPTION - LOCATION
LOCATION: HEAD
LOCATION: HEAD

## 2019-11-22 ASSESSMENT — PAIN DESCRIPTION - PAIN TYPE
TYPE: ACUTE PAIN

## 2019-11-22 ASSESSMENT — PAIN DESCRIPTION - ORIENTATION
ORIENTATION: LEFT
ORIENTATION: LOWER

## 2019-11-22 ASSESSMENT — PAIN DESCRIPTION - FREQUENCY: FREQUENCY: CONTINUOUS

## 2019-11-22 ASSESSMENT — PAIN DESCRIPTION - DESCRIPTORS: DESCRIPTORS: ACHING;DULL;HEADACHE

## 2019-11-22 ASSESSMENT — PAIN DESCRIPTION - PROGRESSION: CLINICAL_PROGRESSION: GRADUALLY IMPROVING

## 2019-11-23 ENCOUNTER — APPOINTMENT (OUTPATIENT)
Dept: CARDIAC CATH/INVASIVE PROCEDURES | Age: 44
DRG: 246 | End: 2019-11-23
Payer: COMMERCIAL

## 2019-11-23 LAB
ACTIVATED CLOTTING TIME: 433 SECONDS (ref 1–150)
EKG ATRIAL RATE: 70 BPM
EKG P AXIS: 37 DEGREES
EKG P-R INTERVAL: 206 MS
EKG Q-T INTERVAL: 398 MS
EKG QRS DURATION: 94 MS
EKG QTC CALCULATION (BAZETT): 429 MS
EKG R AXIS: 19 DEGREES
EKG T AXIS: 15 DEGREES
EKG VENTRICULAR RATE: 70 BPM

## 2019-11-23 PROCEDURE — 2140000000 HC CCU INTERMEDIATE R&B

## 2019-11-23 PROCEDURE — 92928 PRQ TCAT PLMT NTRAC ST 1 LES: CPT | Performed by: INTERNAL MEDICINE

## 2019-11-23 PROCEDURE — 6370000000 HC RX 637 (ALT 250 FOR IP): Performed by: INTERNAL MEDICINE

## 2019-11-23 PROCEDURE — 027034Z DILATION OF CORONARY ARTERY, ONE ARTERY WITH DRUG-ELUTING INTRALUMINAL DEVICE, PERCUTANEOUS APPROACH: ICD-10-PCS | Performed by: INTERNAL MEDICINE

## 2019-11-23 PROCEDURE — 2580000003 HC RX 258: Performed by: INTERNAL MEDICINE

## 2019-11-23 PROCEDURE — 2500000003 HC RX 250 WO HCPCS

## 2019-11-23 PROCEDURE — 6360000004 HC RX CONTRAST MEDICATION: Performed by: INTERNAL MEDICINE

## 2019-11-23 PROCEDURE — C1725 CATH, TRANSLUMIN NON-LASER: HCPCS

## 2019-11-23 PROCEDURE — C1769 GUIDE WIRE: HCPCS

## 2019-11-23 PROCEDURE — 85347 COAGULATION TIME ACTIVATED: CPT

## 2019-11-23 PROCEDURE — 6360000002 HC RX W HCPCS

## 2019-11-23 PROCEDURE — 2709999900 HC NON-CHARGEABLE SUPPLY

## 2019-11-23 PROCEDURE — C1894 INTRO/SHEATH, NON-LASER: HCPCS

## 2019-11-23 PROCEDURE — C1874 STENT, COATED/COV W/DEL SYS: HCPCS

## 2019-11-23 RX ORDER — ACETAMINOPHEN 325 MG/1
650 TABLET ORAL EVERY 4 HOURS PRN
Status: DISCONTINUED | OUTPATIENT
Start: 2019-11-23 | End: 2019-11-24 | Stop reason: HOSPADM

## 2019-11-23 RX ORDER — SODIUM CHLORIDE 0.9 % (FLUSH) 0.9 %
10 SYRINGE (ML) INJECTION EVERY 12 HOURS SCHEDULED
Status: DISCONTINUED | OUTPATIENT
Start: 2019-11-23 | End: 2019-11-24 | Stop reason: HOSPADM

## 2019-11-23 RX ORDER — SODIUM CHLORIDE 0.9 % (FLUSH) 0.9 %
10 SYRINGE (ML) INJECTION PRN
Status: DISCONTINUED | OUTPATIENT
Start: 2019-11-23 | End: 2019-11-24 | Stop reason: HOSPADM

## 2019-11-23 RX ADMIN — CARVEDILOL 3.12 MG: 3.12 TABLET, FILM COATED ORAL at 09:49

## 2019-11-23 RX ADMIN — TICAGRELOR 90 MG: 90 TABLET ORAL at 07:45

## 2019-11-23 RX ADMIN — ASPIRIN 81 MG 81 MG: 81 TABLET ORAL at 07:51

## 2019-11-23 RX ADMIN — LISINOPRIL 5 MG: 5 TABLET ORAL at 09:49

## 2019-11-23 RX ADMIN — TICAGRELOR 90 MG: 90 TABLET ORAL at 20:28

## 2019-11-23 RX ADMIN — CARVEDILOL 3.12 MG: 3.12 TABLET, FILM COATED ORAL at 16:20

## 2019-11-23 RX ADMIN — ATORVASTATIN CALCIUM 80 MG: 80 TABLET, FILM COATED ORAL at 20:28

## 2019-11-23 RX ADMIN — SODIUM CHLORIDE, PRESERVATIVE FREE 10 ML: 5 INJECTION INTRAVENOUS at 20:29

## 2019-11-23 RX ADMIN — IOPAMIDOL 80 ML: 755 INJECTION, SOLUTION INTRAVENOUS at 08:41

## 2019-11-23 ASSESSMENT — PAIN SCALES - GENERAL
PAINLEVEL_OUTOF10: 0

## 2019-11-24 VITALS
RESPIRATION RATE: 18 BRPM | DIASTOLIC BLOOD PRESSURE: 76 MMHG | WEIGHT: 199.3 LBS | TEMPERATURE: 99.1 F | SYSTOLIC BLOOD PRESSURE: 120 MMHG | OXYGEN SATURATION: 98 % | HEART RATE: 79 BPM | BODY MASS INDEX: 29.52 KG/M2 | HEIGHT: 69 IN

## 2019-11-24 LAB — MRSA SCREEN: NORMAL

## 2019-11-24 PROCEDURE — 99238 HOSP IP/OBS DSCHRG MGMT 30/<: CPT | Performed by: NURSE PRACTITIONER

## 2019-11-24 PROCEDURE — 94760 N-INVAS EAR/PLS OXIMETRY 1: CPT

## 2019-11-24 PROCEDURE — 2580000003 HC RX 258: Performed by: INTERNAL MEDICINE

## 2019-11-24 PROCEDURE — 6370000000 HC RX 637 (ALT 250 FOR IP): Performed by: INTERNAL MEDICINE

## 2019-11-24 RX ORDER — ATORVASTATIN CALCIUM 80 MG/1
80 TABLET, FILM COATED ORAL NIGHTLY
Qty: 90 TABLET | Refills: 3 | Status: SHIPPED | OUTPATIENT
Start: 2019-11-24 | End: 2020-04-22

## 2019-11-24 RX ORDER — NITROGLYCERIN 0.4 MG/1
0.4 TABLET SUBLINGUAL EVERY 5 MIN PRN
Qty: 25 TABLET | Refills: 3 | Status: SHIPPED | OUTPATIENT
Start: 2019-11-24

## 2019-11-24 RX ORDER — ASPIRIN 81 MG/1
81 TABLET ORAL DAILY
Qty: 90 TABLET | Refills: 1 | Status: ON HOLD
Start: 2019-11-24 | End: 2021-06-09 | Stop reason: HOSPADM

## 2019-11-24 RX ORDER — CARVEDILOL 3.12 MG/1
3.12 TABLET ORAL 2 TIMES DAILY WITH MEALS
Qty: 180 TABLET | Refills: 3 | Status: SHIPPED | OUTPATIENT
Start: 2019-11-24 | End: 2020-10-22 | Stop reason: DRUGHIGH

## 2019-11-24 RX ADMIN — ASPIRIN 81 MG 81 MG: 81 TABLET ORAL at 09:06

## 2019-11-24 RX ADMIN — LISINOPRIL 5 MG: 5 TABLET ORAL at 09:05

## 2019-11-24 RX ADMIN — SODIUM CHLORIDE, PRESERVATIVE FREE 10 ML: 5 INJECTION INTRAVENOUS at 09:07

## 2019-11-24 RX ADMIN — TICAGRELOR 90 MG: 90 TABLET ORAL at 09:05

## 2019-11-24 RX ADMIN — CARVEDILOL 3.12 MG: 3.12 TABLET, FILM COATED ORAL at 09:05

## 2019-11-24 ASSESSMENT — PAIN SCALES - GENERAL
PAINLEVEL_OUTOF10: 0
PAINLEVEL_OUTOF10: 0

## 2019-11-25 ENCOUNTER — TELEPHONE (OUTPATIENT)
Dept: FAMILY MEDICINE CLINIC | Age: 44
End: 2019-11-25

## 2019-12-04 ENCOUNTER — OFFICE VISIT (OUTPATIENT)
Dept: FAMILY MEDICINE CLINIC | Age: 44
End: 2019-12-04
Payer: COMMERCIAL

## 2019-12-04 VITALS
HEART RATE: 92 BPM | DIASTOLIC BLOOD PRESSURE: 84 MMHG | WEIGHT: 198.4 LBS | BODY MASS INDEX: 29.3 KG/M2 | RESPIRATION RATE: 14 BRPM | SYSTOLIC BLOOD PRESSURE: 110 MMHG | TEMPERATURE: 98.9 F

## 2019-12-04 DIAGNOSIS — I25.110 CORONARY ARTERY DISEASE INVOLVING NATIVE CORONARY ARTERY OF NATIVE HEART WITH UNSTABLE ANGINA PECTORIS (HCC): Primary | ICD-10-CM

## 2019-12-04 DIAGNOSIS — E03.9 HYPOTHYROIDISM, UNSPECIFIED TYPE: ICD-10-CM

## 2019-12-04 DIAGNOSIS — F51.02 ADJUSTMENT INSOMNIA: ICD-10-CM

## 2019-12-04 DIAGNOSIS — E78.5 HYPERLIPIDEMIA, UNSPECIFIED HYPERLIPIDEMIA TYPE: ICD-10-CM

## 2019-12-04 PROCEDURE — 1111F DSCHRG MED/CURRENT MED MERGE: CPT | Performed by: FAMILY MEDICINE

## 2019-12-04 PROCEDURE — 99495 TRANSJ CARE MGMT MOD F2F 14D: CPT | Performed by: FAMILY MEDICINE

## 2019-12-04 RX ORDER — CLONAZEPAM 0.5 MG/1
0.5 TABLET ORAL NIGHTLY PRN
Qty: 14 TABLET | Refills: 0 | Status: SHIPPED | OUTPATIENT
Start: 2019-12-04 | End: 2021-01-04

## 2019-12-31 ENCOUNTER — OFFICE VISIT (OUTPATIENT)
Dept: CARDIOLOGY CLINIC | Age: 44
End: 2019-12-31
Payer: COMMERCIAL

## 2019-12-31 VITALS
BODY MASS INDEX: 29.44 KG/M2 | SYSTOLIC BLOOD PRESSURE: 135 MMHG | HEART RATE: 87 BPM | DIASTOLIC BLOOD PRESSURE: 98 MMHG | WEIGHT: 198.8 LBS | HEIGHT: 69 IN

## 2019-12-31 DIAGNOSIS — I25.10 CORONARY ARTERY DISEASE INVOLVING NATIVE CORONARY ARTERY OF NATIVE HEART WITHOUT ANGINA PECTORIS: ICD-10-CM

## 2019-12-31 DIAGNOSIS — E78.00 PURE HYPERCHOLESTEROLEMIA: ICD-10-CM

## 2019-12-31 DIAGNOSIS — Z95.9 S/P ARTERIAL STENT: Primary | ICD-10-CM

## 2019-12-31 PROCEDURE — 93000 ELECTROCARDIOGRAM COMPLETE: CPT | Performed by: PHYSICIAN ASSISTANT

## 2019-12-31 PROCEDURE — G8484 FLU IMMUNIZE NO ADMIN: HCPCS | Performed by: PHYSICIAN ASSISTANT

## 2019-12-31 PROCEDURE — 1036F TOBACCO NON-USER: CPT | Performed by: PHYSICIAN ASSISTANT

## 2019-12-31 PROCEDURE — 99214 OFFICE O/P EST MOD 30 MIN: CPT | Performed by: PHYSICIAN ASSISTANT

## 2019-12-31 PROCEDURE — G8417 CALC BMI ABV UP PARAM F/U: HCPCS | Performed by: PHYSICIAN ASSISTANT

## 2019-12-31 PROCEDURE — G8427 DOCREV CUR MEDS BY ELIG CLIN: HCPCS | Performed by: PHYSICIAN ASSISTANT

## 2019-12-31 PROCEDURE — G8598 ASA/ANTIPLAT THER USED: HCPCS | Performed by: PHYSICIAN ASSISTANT

## 2019-12-31 RX ORDER — VITAMIN B COMPLEX
100 TABLET ORAL DAILY
Qty: 30 CAPSULE | Refills: 3 | Status: SHIPPED | OUTPATIENT
Start: 2019-12-31 | End: 2020-03-23 | Stop reason: SDUPTHER

## 2020-02-06 RX ORDER — AMOXICILLIN AND CLAVULANATE POTASSIUM 875; 125 MG/1; MG/1
1 TABLET, FILM COATED ORAL 2 TIMES DAILY
Qty: 20 TABLET | Refills: 0 | Status: SHIPPED | OUTPATIENT
Start: 2020-02-06 | End: 2020-02-16

## 2020-02-12 ENCOUNTER — APPOINTMENT (OUTPATIENT)
Dept: CT IMAGING | Age: 45
End: 2020-02-12
Payer: OTHER MISCELLANEOUS

## 2020-02-12 ENCOUNTER — APPOINTMENT (OUTPATIENT)
Dept: GENERAL RADIOLOGY | Age: 45
End: 2020-02-12
Payer: OTHER MISCELLANEOUS

## 2020-02-12 ENCOUNTER — HOSPITAL ENCOUNTER (EMERGENCY)
Age: 45
Discharge: HOME | End: 2020-02-12
Attending: EMERGENCY MEDICINE
Payer: OTHER MISCELLANEOUS

## 2020-02-12 VITALS
BODY MASS INDEX: 29.62 KG/M2 | RESPIRATION RATE: 18 BRPM | DIASTOLIC BLOOD PRESSURE: 69 MMHG | HEIGHT: 69 IN | WEIGHT: 200 LBS | SYSTOLIC BLOOD PRESSURE: 97 MMHG | HEART RATE: 78 BPM | OXYGEN SATURATION: 99 % | TEMPERATURE: 97.7 F

## 2020-02-12 LAB
ABO: NORMAL
ALBUMIN SERPL-MCNC: 4.3 G/DL (ref 3.5–5.1)
ALP BLD-CCNC: 63 U/L (ref 38–126)
ALT SERPL-CCNC: 21 U/L (ref 11–66)
ANGLE TEG: 74.8 DEG (ref 53–72)
ANION GAP SERPL CALCULATED.3IONS-SCNC: 16 MEQ/L (ref 8–16)
ANTIBODY SCREEN: NORMAL
AST SERPL-CCNC: 20 U/L (ref 5–40)
BASOPHILS # BLD: 0.7 %
BASOPHILS ABSOLUTE: 0 THOU/MM3 (ref 0–0.1)
BILIRUB SERPL-MCNC: 1.5 MG/DL (ref 0.3–1.2)
BILIRUBIN DIRECT: 0.3 MG/DL (ref 0–0.3)
BUN BLDV-MCNC: 23 MG/DL (ref 7–22)
CALCIUM SERPL-MCNC: 9.8 MG/DL (ref 8.5–10.5)
CHLORIDE BLD-SCNC: 96 MEQ/L (ref 98–111)
CO2: 25 MEQ/L (ref 23–33)
CREAT SERPL-MCNC: 1.4 MG/DL (ref 0.4–1.2)
EKG ATRIAL RATE: 78 BPM
EKG P AXIS: 21 DEGREES
EKG P-R INTERVAL: 206 MS
EKG Q-T INTERVAL: 406 MS
EKG QRS DURATION: 92 MS
EKG QTC CALCULATION (BAZETT): 462 MS
EKG R AXIS: 9 DEGREES
EKG T AXIS: -16 DEGREES
EKG VENTRICULAR RATE: 78 BPM
EOSINOPHIL # BLD: 1.2 %
EOSINOPHILS ABSOLUTE: 0.1 THOU/MM3 (ref 0–0.4)
EPL-TEG: 1.2 %
ERYTHROCYTE [DISTWIDTH] IN BLOOD BY AUTOMATED COUNT: 12.6 % (ref 11.5–14.5)
ERYTHROCYTE [DISTWIDTH] IN BLOOD BY AUTOMATED COUNT: 42.1 FL (ref 35–45)
GFR SERPL CREATININE-BSD FRML MDRD: 55 ML/MIN/1.73M2
GLUCOSE BLD-MCNC: 105 MG/DL (ref 70–108)
HCT VFR BLD CALC: 30.1 % (ref 42–52)
HEMOCCULT STL QL: NEGATIVE
HEMOGLOBIN: 10.3 GM/DL (ref 14–18)
HEPARIN THERAPY: NO
IMMATURE GRANS (ABS): 0.04 THOU/MM3 (ref 0–0.07)
IMMATURE GRANULOCYTES: 0.7 %
INHIBITION AA TEG: 97.4 %
INHIBITION ADP TEG: 96.6 %
KINETICS TEG: 1.1 MINUTES (ref 1–3)
LIPASE: 32.4 U/L (ref 5.6–51.3)
LY30 (LYSIS) TEG: 1.2 % (ref 0–7.5)
LYMPHOCYTES # BLD: 12.2 %
LYMPHOCYTES ABSOLUTE: 0.7 THOU/MM3 (ref 1–4.8)
MA (MAX CLOT) TEG: 73 MM (ref 50–70)
MA(AA) TEG: 13.4 MM
MA(ACTIVATED) TEG: 11.8 MM
MA(ADP) TEG: 13.9 MM
MAGNESIUM: 2 MG/DL (ref 1.6–2.4)
MCH RBC QN AUTO: 31.8 PG (ref 26–33)
MCHC RBC AUTO-ENTMCNC: 34.2 GM/DL (ref 32.2–35.5)
MCV RBC AUTO: 92.9 FL (ref 80–94)
MONOCYTES # BLD: 12.9 %
MONOCYTES ABSOLUTE: 0.8 THOU/MM3 (ref 0.4–1.3)
NUCLEATED RED BLOOD CELLS: 0 /100 WBC
OSMOLALITY CALCULATION: 277.9 MOSMOL/KG (ref 275–300)
PLATELET # BLD: 275 THOU/MM3 (ref 130–400)
PMV BLD AUTO: 10.3 FL (ref 9.4–12.4)
POTASSIUM SERPL-SCNC: 4 MEQ/L (ref 3.5–5.2)
RBC # BLD: 3.24 MILL/MM3 (ref 4.7–6.1)
REACTION TIME TEG: 7.9 MINUTES (ref 5–10)
RH FACTOR: NORMAL
SEG NEUTROPHILS: 72.3 %
SEGMENTED NEUTROPHILS ABSOLUTE COUNT: 4.3 THOU/MM3 (ref 1.8–7.7)
SODIUM BLD-SCNC: 137 MEQ/L (ref 135–145)
TOTAL CK: 235 U/L (ref 55–170)
TOTAL PROTEIN: 8 G/DL (ref 6.1–8)
WBC # BLD: 6 THOU/MM3 (ref 4.8–10.8)

## 2020-02-12 PROCEDURE — 93005 ELECTROCARDIOGRAM TRACING: CPT | Performed by: PHYSICIAN ASSISTANT

## 2020-02-12 PROCEDURE — 99285 EMERGENCY DEPT VISIT HI MDM: CPT

## 2020-02-12 PROCEDURE — 82272 OCCULT BLD FECES 1-3 TESTS: CPT

## 2020-02-12 PROCEDURE — 73564 X-RAY EXAM KNEE 4 OR MORE: CPT

## 2020-02-12 PROCEDURE — 86900 BLOOD TYPING SEROLOGIC ABO: CPT

## 2020-02-12 PROCEDURE — 76376 3D RENDER W/INTRP POSTPROCES: CPT

## 2020-02-12 PROCEDURE — 71260 CT THORAX DX C+: CPT

## 2020-02-12 PROCEDURE — 70450 CT HEAD/BRAIN W/O DYE: CPT

## 2020-02-12 PROCEDURE — 73130 X-RAY EXAM OF HAND: CPT

## 2020-02-12 PROCEDURE — APPSS180 APP SPLIT SHARED TIME > 60 MINUTES: Performed by: PHYSICIAN ASSISTANT

## 2020-02-12 PROCEDURE — 80053 COMPREHEN METABOLIC PANEL: CPT

## 2020-02-12 PROCEDURE — 82550 ASSAY OF CK (CPK): CPT

## 2020-02-12 PROCEDURE — 2580000003 HC RX 258: Performed by: PHYSICIAN ASSISTANT

## 2020-02-12 PROCEDURE — 86901 BLOOD TYPING SEROLOGIC RH(D): CPT

## 2020-02-12 PROCEDURE — 36415 COLL VENOUS BLD VENIPUNCTURE: CPT

## 2020-02-12 PROCEDURE — 99282 EMERGENCY DEPT VISIT SF MDM: CPT | Performed by: SURGERY

## 2020-02-12 PROCEDURE — 74177 CT ABD & PELVIS W/CONTRAST: CPT

## 2020-02-12 PROCEDURE — 6360000004 HC RX CONTRAST MEDICATION: Performed by: PHYSICIAN ASSISTANT

## 2020-02-12 PROCEDURE — 86850 RBC ANTIBODY SCREEN: CPT

## 2020-02-12 PROCEDURE — 85025 COMPLETE CBC W/AUTO DIFF WBC: CPT

## 2020-02-12 PROCEDURE — 83690 ASSAY OF LIPASE: CPT

## 2020-02-12 PROCEDURE — 85576 BLOOD PLATELET AGGREGATION: CPT

## 2020-02-12 PROCEDURE — 83735 ASSAY OF MAGNESIUM: CPT

## 2020-02-12 PROCEDURE — 82248 BILIRUBIN DIRECT: CPT

## 2020-02-12 PROCEDURE — 72125 CT NECK SPINE W/O DYE: CPT

## 2020-02-12 RX ORDER — SODIUM CHLORIDE 9 MG/ML
INJECTION, SOLUTION INTRAVENOUS CONTINUOUS
Status: DISCONTINUED | OUTPATIENT
Start: 2020-02-12 | End: 2020-02-12 | Stop reason: HOSPADM

## 2020-02-12 RX ORDER — HYDROCODONE BITARTRATE AND ACETAMINOPHEN 5; 325 MG/1; MG/1
1 TABLET ORAL EVERY 6 HOURS PRN
Qty: 12 TABLET | Refills: 0 | Status: SHIPPED | OUTPATIENT
Start: 2020-02-12 | End: 2020-02-15

## 2020-02-12 RX ORDER — 0.9 % SODIUM CHLORIDE 0.9 %
1000 INTRAVENOUS SOLUTION INTRAVENOUS ONCE
Status: COMPLETED | OUTPATIENT
Start: 2020-02-12 | End: 2020-02-12

## 2020-02-12 RX ADMIN — IOPAMIDOL 80 ML: 755 INJECTION, SOLUTION INTRAVENOUS at 11:56

## 2020-02-12 RX ADMIN — SODIUM CHLORIDE: 9 INJECTION, SOLUTION INTRAVENOUS at 12:37

## 2020-02-12 RX ADMIN — SODIUM CHLORIDE 1000 ML: 9 INJECTION, SOLUTION INTRAVENOUS at 13:41

## 2020-02-12 ASSESSMENT — PAIN DESCRIPTION - DESCRIPTORS: DESCRIPTORS: DULL

## 2020-02-12 ASSESSMENT — ENCOUNTER SYMPTOMS
DIARRHEA: 0
COUGH: 0
NAUSEA: 0
ROS SKIN COMMENTS: SCATTERED ECCHYMOSIS
VOMITING: 0
WHEEZING: 0
STRIDOR: 0
PHOTOPHOBIA: 0
BACK PAIN: 0
SHORTNESS OF BREATH: 0
EYE PAIN: 0
ABDOMINAL PAIN: 1
FACIAL SWELLING: 0
SORE THROAT: 0
RHINORRHEA: 0
CONSTIPATION: 0

## 2020-02-12 ASSESSMENT — PAIN DESCRIPTION - FREQUENCY: FREQUENCY: INTERMITTENT

## 2020-02-12 ASSESSMENT — PAIN DESCRIPTION - PAIN TYPE: TYPE: ACUTE PAIN

## 2020-02-12 ASSESSMENT — PAIN DESCRIPTION - LOCATION: LOCATION: OTHER (COMMENT)

## 2020-02-12 ASSESSMENT — PAIN SCALES - GENERAL: PAINLEVEL_OUTOF10: 6
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
## 2020-02-12 NOTE — ED NOTES
Pt discharged with instructions and prescriptions, verbalized understanding. Pt and family given opportunity to ask questions and discuss plan of care with treatment team.  Pt and family denies additional needs at present time and are in agreement with current plan of care. Pt refused need for w/c assistance. Ambulated out of ED with steady gait in stable condition.           Lara Mcneil RN  02/12/20 [id number]
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
## 2020-02-13 ENCOUNTER — TELEPHONE (OUTPATIENT)
Dept: FAMILY MEDICINE CLINIC | Age: 45
End: 2020-02-13

## 2020-02-13 NOTE — LETTER
[address]  [address] [facility] , [address]  Phone: [phone]  Fax: [phone]    February 13, 2020    [address]      Dear Charles Gallardo,    Thank you for choosing [address] [place] [address] Emergency Department on 2/12/20. Dr. Murtaza Pradhan wanted to make sure that you understand your discharge instructions and that you were able to fill any prescriptions that may have been ordered for you. Please contact the office at the above phone number if the ED advised to you follow up with Dr. Murtaza Pradhan, or if you have any further questions or needs. Also did you know -   *Visiting the ED for a non-emergency could result in higher co-pays than you would normally be subject to paying? *You can call your doctor even after hours so they can direct you to the most appropriate care. [facility]) practices can often offer you an appointment on the same day that you call. *We have some [facility] offices that offer Walk-in appointments; check our website for availability in your community, [email or web address].      *Evisits are now available for patients for $36 through [employer] for certain conditions:  * Sinus, cold and or cough       * Diarrhea            * Headache       *vaginal discharge     *flu symptoms  * Heartburn                                * Poison Rosemarie          * Back pain     * Urinary problems                               If you do not have [employer] and are interested, please contact the office and a staff member may assist you or go to [email or web address].     Sincerely,     Murtaza Pradhan MD and your [facility]

## 2020-02-14 ENCOUNTER — OFFICE VISIT (OUTPATIENT)
Dept: FAMILY MEDICINE CLINIC | Age: 45
End: 2020-02-14
Payer: COMMERCIAL

## 2020-02-14 VITALS
DIASTOLIC BLOOD PRESSURE: 60 MMHG | OXYGEN SATURATION: 99 % | HEIGHT: 69 IN | RESPIRATION RATE: 20 BRPM | HEART RATE: 109 BPM | TEMPERATURE: 98.1 F | SYSTOLIC BLOOD PRESSURE: 98 MMHG | WEIGHT: 200 LBS | BODY MASS INDEX: 29.62 KG/M2

## 2020-02-14 PROCEDURE — G8427 DOCREV CUR MEDS BY ELIG CLIN: HCPCS | Performed by: FAMILY MEDICINE

## 2020-02-14 PROCEDURE — G8417 CALC BMI ABV UP PARAM F/U: HCPCS | Performed by: FAMILY MEDICINE

## 2020-02-14 PROCEDURE — 1036F TOBACCO NON-USER: CPT | Performed by: FAMILY MEDICINE

## 2020-02-14 PROCEDURE — 99213 OFFICE O/P EST LOW 20 MIN: CPT | Performed by: FAMILY MEDICINE

## 2020-02-14 PROCEDURE — G8484 FLU IMMUNIZE NO ADMIN: HCPCS | Performed by: FAMILY MEDICINE

## 2020-02-14 ASSESSMENT — PATIENT HEALTH QUESTIONNAIRE - PHQ9
SUM OF ALL RESPONSES TO PHQ QUESTIONS 1-9: 0
SUM OF ALL RESPONSES TO PHQ9 QUESTIONS 1 & 2: 0
2. FEELING DOWN, DEPRESSED OR HOPELESS: 0
1. LITTLE INTEREST OR PLEASURE IN DOING THINGS: 0
SUM OF ALL RESPONSES TO PHQ QUESTIONS 1-9: 0

## 2020-02-14 ASSESSMENT — ENCOUNTER SYMPTOMS
COLOR CHANGE: 1
SHORTNESS OF BREATH: 0
EYE PAIN: 0
ABDOMINAL PAIN: 0
DIARRHEA: 0
COUGH: 0
ABDOMINAL DISTENTION: 0
RHINORRHEA: 0
CONSTIPATION: 0
SORE THROAT: 0
NAUSEA: 0
SINUS PRESSURE: 0
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
## 2020-02-27 ENCOUNTER — TELEPHONE (OUTPATIENT)
Dept: FAMILY MEDICINE CLINIC | Age: 45
End: 2020-02-27

## 2020-02-27 LAB
ABSOLUTE BASO #: 0.1 X10E9/L (ref 0–0.9)
ABSOLUTE EOS #: 0.1 X10E9/L (ref 0–0.4)
ABSOLUTE LYMPH #: 0.5 X10E9/L (ref 1–3.5)
ABSOLUTE MONO #: 0.8 X10E9/L (ref 0–0.9)
ABSOLUTE NEUT #: 6.6 X10E9/L (ref 1.5–6.6)
BASOPHILS RELATIVE PERCENT: 0.8 %
EOSINOPHILS RELATIVE PERCENT: 1.7 %
HCT VFR BLD CALC: 35.1 % (ref 39–49)
HEMOGLOBIN: 12.4 G/DL (ref 13.1–17.3)
LYMPHOCYTE %: 6.2 %
MCH RBC QN AUTO: 33.7 PG (ref 27–34)
MCHC RBC AUTO-ENTMCNC: 35.4 G/DL (ref 32–37)
MCV RBC AUTO: 95 FL (ref 80–99)
MONOCYTES # BLD: 9.7 %
NEUTROPHILS RELATIVE PERCENT: 81.6 %
PDW BLD-RTO: 14.1 % (ref 11.4–14.3)
PLATELETS: 282 X10E9/L (ref 150–450)
PMV BLD AUTO: 8.4 FL (ref 7–12)
RBC: 3.69 X10E12/L (ref 4.25–5.65)
WBC: 8.1 X10E9/L (ref 4.8–10.8)

## 2020-03-23 RX ORDER — VITAMIN B COMPLEX
100 TABLET ORAL DAILY
Qty: 30 CAPSULE | Refills: 0 | Status: SHIPPED | OUTPATIENT
Start: 2020-03-23 | End: 2021-06-03 | Stop reason: DRUGHIGH

## 2020-03-23 RX ORDER — VITAMIN B COMPLEX
100 TABLET ORAL DAILY
Qty: 90 CAPSULE | Refills: 3 | Status: SHIPPED | OUTPATIENT
Start: 2020-03-23 | End: 2020-03-23 | Stop reason: SDUPTHER

## 2020-04-16 ENCOUNTER — TELEPHONE (OUTPATIENT)
Dept: ADMINISTRATIVE | Age: 45
End: 2020-04-16

## 2020-04-21 ENCOUNTER — TELEPHONE (OUTPATIENT)
Dept: CARDIOLOGY CLINIC | Age: 45
End: 2020-04-21

## 2020-04-22 ENCOUNTER — TELEMEDICINE (OUTPATIENT)
Dept: CARDIOLOGY CLINIC | Age: 45
End: 2020-04-22
Payer: COMMERCIAL

## 2020-04-22 ENCOUNTER — TELEPHONE (OUTPATIENT)
Dept: CARDIOLOGY CLINIC | Age: 45
End: 2020-04-22

## 2020-04-22 PROCEDURE — G8428 CUR MEDS NOT DOCUMENT: HCPCS | Performed by: INTERNAL MEDICINE

## 2020-04-22 PROCEDURE — 99213 OFFICE O/P EST LOW 20 MIN: CPT | Performed by: INTERNAL MEDICINE

## 2020-04-22 RX ORDER — ATORVASTATIN CALCIUM 40 MG/1
40 TABLET, FILM COATED ORAL NIGHTLY
Qty: 30 TABLET | Refills: 3 | Status: SHIPPED | OUTPATIENT
Start: 2020-04-22 | End: 2020-10-22 | Stop reason: ALTCHOICE

## 2020-04-22 NOTE — PROGRESS NOTES
[address] [facility],[place] [address] [address]  [place] [address]  Dept: [phone]  Dept Fax: [phone]  Loc: [phone]    Visit Date: 4/22/2020    TELEHEALTH EVALUATION -- Audio/Visual (During [facility]- public health emergency)    Jose Manuel Overton is a 41 y.o. male who is seen via Virtual Video MyChart visit. Jose Manuel Overton was at home. Provider was present at Cardiology clinic. Cardiology staff assisted. HPI:   HPI   42 yo M hx of STEMI 11/2019 s/p PCI x 2 IDANIA, and then LAD PCI afterwards, with residual OM branch stenosis who presents for VV follow-up. He did have chest pain, substernal, 2 months after the PCI, but no recurrence. Did not take NTG SL. He is on DAPT, no bleeding. No current chest pain, angina, RICE, orthopnea, PND, sob at rest, palpitations, LE edema, or syncope. Able to walk without discomfort. He is trying to be as active as possible. 11/2019 EF 55-60%. He has been having issues with statins - aches. COQ 10 has helped somewhat. His legs hurt all the time, because of the discomfort. Urination is normal, no bleeding. Current Outpatient Medications:     Coenzyme Q10 (COQ10) 100 MG CAPS, Take 100 mg by mouth daily, Disp: 30 capsule, Rfl: 0    clonazePAM (KLONOPIN) 0.5 MG tablet, Take 1 tablet by mouth nightly as needed (insomnia) for up to 14 days. , Disp: 14 tablet, Rfl: 0    atorvastatin (LIPITOR) 80 MG tablet, Take 1 tablet by mouth nightly, Disp: 90 tablet, Rfl: 3    carvedilol (COREG) 3.125 MG tablet, Take 1 tablet by mouth 2 times daily (with meals), Disp: 180 tablet, Rfl: 3    ticagrelor (BRILINTA) 90 MG TABS tablet, Take 1 tablet by mouth 2 times daily, Disp: 180 tablet, Rfl: 3    aspirin EC 81 MG EC tablet, Take 1 tablet by mouth daily, Disp: 90 tablet, Rfl: 1    nitroGLYCERIN (NITROSTAT) 0.4 MG SL tablet, Place 1 tablet under the tongue every 5 minutes as needed for Chest pain up to max of 3 total Normocephalic, atraumatic.   [] Abnormal   [] Mouth/Throat: Mucous membranes are moist.     External Ears [x] Normal  [] Abnormal-     Neck: [x] No visualized mass     Pulmonary/Chest: [x] Respiratory effort normal.  [x] No visualized signs of difficulty breathing or respiratory distress        [] Abnormal-     Neurological:        [x] No Facial Asymmetry (Cranial nerve 7 motor function) (limited exam to video visit)          [] No gaze palsy        [] Abnormal-         Skin:        [x] No significant exanthematous lesions or discoloration noted on facial skin         [] Abnormal-            Psychiatric:       [x] Normal Affect [] No Hallucinations        [] Abnormal-     Other pertinent observable physical exam findings: None        Lab Results   Component Value Date    CKTOTAL 235 02/12/2020       Lab Results   Component Value Date    WBC 8.1 02/26/2020    WBC 6.0 02/12/2020    RBC 3.69 02/26/2020    HGB 12.4 02/26/2020    HCT 35.1 02/26/2020    MCV 95 02/26/2020    MCH 33.7 02/26/2020    MCHC 35.4 02/26/2020    RDW 14.1 02/26/2020     02/26/2020     02/12/2020    MPV 8.4 02/26/2020       Lab Results   Component Value Date     02/12/2020    K 4.0 02/12/2020    CL 96 02/12/2020    CO2 25 02/12/2020    BUN 23 02/12/2020    LABALBU 4.3 02/12/2020    CREATININE 1.4 02/12/2020    CALCIUM 9.8 02/12/2020    LABGLOM 55 02/12/2020    GLUCOSE 105 02/12/2020    GLUCOSE 98 07/03/2019       Lab Results   Component Value Date    ALKPHOS 63 02/12/2020    ALT 21 02/12/2020    AST 20 02/12/2020    PROT 8.0 02/12/2020    BILITOT 1.5 02/12/2020    BILIDIR 0.3 02/12/2020    LABALBU 4.3 02/12/2020       Lab Results   Component Value Date    MG 2.0 02/12/2020       Lab Results   Component Value Date    INR 0.92 11/22/2019         No results found for: LABA1C    Lab Results   Component Value Date    TRIG 380 11/22/2019    HDL 51 11/22/2019    LDLCALC 140 11/22/2019    LABVLDL 69 07/03/2019       Lab Results Statin-induced myalgias  Preserved EF  Single episode of discomfort, resolved, no recurrent issues. Meds are optimized. Will cut statins in half and observe. Urination is stable, no color changes. If worse, may need labs. For now will continue all other drugs. Follow FLP. Continue exercising. No cardiac rehab due to scheduling/COVID. Discussed diet/exercise/BP/weight loss/health lifestyle choices/lipids; the patient understands the goals and will try to comply. Disposition:  6 months    Pursuant to the emergency declaration under the [facility], [facility] waiver authority and the [employer] Act, this Virtual  Visit was conducted, with patient's consent, to reduce the patient's risk of exposure to COVID-19 and provide continuity of care for an established patient. Services were provided through a video synchronous discussion virtually to substitute for in-person clinic visit. Greater then 15 minutes of time was spent reviewing the chart and educating the patient about HF, medications, diet, exercise, and discussing the plan of care. I personally spent more then 50% of the appt time face to face with the patient counseling/coordinating patient's care.       Electronically signed by Alycia Abebe MD   4/22/2020 at 12:12 PM EDT

## 2020-06-17 ENCOUNTER — TELEPHONE (OUTPATIENT)
Dept: FAMILY MEDICINE CLINIC | Age: 45
End: 2020-06-17

## 2020-06-17 LAB
ABSOLUTE BASO #: 0.1 X10E9/L (ref 0–0.9)
ABSOLUTE EOS #: 0 X10E9/L (ref 0–0.4)
ABSOLUTE LYMPH #: 0.6 X10E9/L (ref 1–3.5)
ABSOLUTE MONO #: 0.8 X10E9/L (ref 0–0.9)
ABSOLUTE NEUT #: 4.2 X10E9/L (ref 1.5–6.6)
ALBUMIN SERPL-MCNC: 4.8 G/DL (ref 3.2–5.3)
ALK PHOSPHATASE: 68 U/L (ref 39–130)
ALT SERPL-CCNC: 51 U/L (ref 0–40)
ANION GAP SERPL CALCULATED.3IONS-SCNC: 12 MMOL/L (ref 5–15)
AST SERPL-CCNC: 41 U/L (ref 0–41)
BASOPHILS RELATIVE PERCENT: 0.9 %
BILIRUB SERPL-MCNC: 1.5 MG/DL (ref 0.3–1.2)
BUN BLDV-MCNC: 15 MG/DL (ref 5–23)
CALCIUM SERPL-MCNC: 10.2 MG/DL (ref 8.5–10.5)
CHLORIDE BLD-SCNC: 98 MMOL/L (ref 98–109)
CHOLESTEROL/HDL RATIO: 4.1 (ref 1–5)
CHOLESTEROL: 241 MG/DL (ref 150–200)
CO2: 26 MMOL/L (ref 22–32)
CREAT SERPL-MCNC: 1.09 MG/DL (ref 0.6–1.3)
EGFR AFRICAN AMERICAN: >60 ML/MIN/1.73SQ.M
EGFR IF NONAFRICAN AMERICAN: >60 ML/MIN/1.73SQ.M
EOSINOPHILS RELATIVE PERCENT: 0.5 %
GLUCOSE: 93 MG/DL (ref 65–99)
HCT VFR BLD CALC: 41.1 % (ref 39–49)
HDLC SERPL-MCNC: 59 MG/DL
HEMOGLOBIN: 13.9 G/DL (ref 13.1–17.3)
LDL CHOLESTEROL CALCULATED: 121 MG/DL
LYMPHOCYTE %: 10.6 %
MCH RBC QN AUTO: 32.2 PG (ref 27–34)
MCHC RBC AUTO-ENTMCNC: 33.8 G/DL (ref 32–37)
MCV RBC AUTO: 95 FL (ref 80–99)
MONOCYTES # BLD: 14.4 %
NEUTROPHILS RELATIVE PERCENT: 73.6 %
PDW BLD-RTO: 14 % (ref 11.4–14.3)
PLATELETS: 289 X10E9/L (ref 150–450)
PMV BLD AUTO: 8.4 FL (ref 7–12)
POTASSIUM SERPL-SCNC: 4 MMOL/L (ref 3.5–5)
RBC: 4.32 X10E12/L (ref 4.25–5.65)
SODIUM BLD-SCNC: 136 MMOL/L (ref 134–146)
T4 FREE: 0.75 NG/DL (ref 0.61–1.6)
TOTAL CK: 114 U/L (ref 24–195)
TOTAL PROTEIN: 8.4 G/DL (ref 6–8)
TRIGL SERPL-MCNC: 304 MG/DL (ref 27–150)
TSH SERPL DL<=0.05 MIU/L-ACNC: 2.88 UIU/ML (ref 0.49–4.67)
VLDLC SERPL CALC-MCNC: 61 MG/DL (ref 0–30)
WBC: 5.7 X10E9/L (ref 4.8–10.8)

## 2020-08-31 RX ORDER — LEVOTHYROXINE SODIUM 0.05 MG/1
TABLET ORAL
Qty: 90 TABLET | Refills: 1 | Status: SHIPPED | OUTPATIENT
Start: 2020-08-31 | End: 2021-03-01

## 2020-09-03 RX ORDER — LOSARTAN POTASSIUM AND HYDROCHLOROTHIAZIDE 12.5; 5 MG/1; MG/1
TABLET ORAL
Qty: 90 TABLET | Refills: 3 | Status: ON HOLD | OUTPATIENT
Start: 2020-09-03 | End: 2021-06-09 | Stop reason: HOSPADM

## 2020-10-08 LAB
ALBUMIN SERPL-MCNC: 4.5 G/DL (ref 3.2–5.3)
ALK PHOSPHATASE: 58 U/L (ref 39–130)
ALT SERPL-CCNC: 26 U/L (ref 0–40)
ANION GAP SERPL CALCULATED.3IONS-SCNC: 10 MMOL/L (ref 5–15)
AST SERPL-CCNC: 27 U/L (ref 0–41)
BILIRUB SERPL-MCNC: 0.9 MG/DL (ref 0.3–1.2)
BUN BLDV-MCNC: 15 MG/DL (ref 5–23)
CALCIUM SERPL-MCNC: 10.2 MG/DL (ref 8.5–10.5)
CHLORIDE BLD-SCNC: 99 MMOL/L (ref 98–109)
CHOLESTEROL/HDL RATIO: 3.5 (ref 1–5)
CHOLESTEROL: 181 MG/DL (ref 150–200)
CO2: 26 MMOL/L (ref 22–32)
CREAT SERPL-MCNC: 1.1 MG/DL (ref 0.6–1.3)
EGFR AFRICAN AMERICAN: >60 ML/MIN/1.73SQ.M
EGFR IF NONAFRICAN AMERICAN: >60 ML/MIN/1.73SQ.M
GLUCOSE: 91 MG/DL (ref 65–99)
HDLC SERPL-MCNC: 52 MG/DL
LDL CHOLESTEROL CALCULATED: 77 MG/DL
LDL/HDL RATIO: 1.5
POTASSIUM SERPL-SCNC: 4.2 MMOL/L (ref 3.5–5)
SODIUM BLD-SCNC: 135 MMOL/L (ref 134–146)
T4 FREE: 0.76 NG/DL (ref 0.61–1.6)
TOTAL PROTEIN: 8 G/DL (ref 6–8)
TRIGL SERPL-MCNC: 260 MG/DL (ref 27–150)
TSH SERPL DL<=0.05 MIU/L-ACNC: 1.58 UIU/ML (ref 0.49–4.67)
VLDLC SERPL CALC-MCNC: 52 MG/DL (ref 0–30)

## 2020-10-22 ENCOUNTER — OFFICE VISIT (OUTPATIENT)
Dept: CARDIOLOGY CLINIC | Age: 45
End: 2020-10-22
Payer: COMMERCIAL

## 2020-10-22 VITALS
HEIGHT: 70 IN | WEIGHT: 200.4 LBS | DIASTOLIC BLOOD PRESSURE: 82 MMHG | SYSTOLIC BLOOD PRESSURE: 146 MMHG | BODY MASS INDEX: 28.69 KG/M2 | HEART RATE: 92 BPM

## 2020-10-22 PROCEDURE — G8484 FLU IMMUNIZE NO ADMIN: HCPCS | Performed by: NUCLEAR MEDICINE

## 2020-10-22 PROCEDURE — G8427 DOCREV CUR MEDS BY ELIG CLIN: HCPCS | Performed by: NUCLEAR MEDICINE

## 2020-10-22 PROCEDURE — 99214 OFFICE O/P EST MOD 30 MIN: CPT | Performed by: NUCLEAR MEDICINE

## 2020-10-22 PROCEDURE — G8417 CALC BMI ABV UP PARAM F/U: HCPCS | Performed by: NUCLEAR MEDICINE

## 2020-10-22 PROCEDURE — 1036F TOBACCO NON-USER: CPT | Performed by: NUCLEAR MEDICINE

## 2020-10-22 RX ORDER — ROSUVASTATIN CALCIUM 20 MG/1
20 TABLET, COATED ORAL DAILY
COMMUNITY
End: 2020-10-22 | Stop reason: SDUPTHER

## 2020-10-22 RX ORDER — CARVEDILOL 6.25 MG/1
6.25 TABLET ORAL 2 TIMES DAILY WITH MEALS
Qty: 180 TABLET | Refills: 3 | Status: SHIPPED | OUTPATIENT
Start: 2020-10-22 | End: 2021-04-07 | Stop reason: SDUPTHER

## 2020-10-22 RX ORDER — CARVEDILOL 6.25 MG/1
6.25 TABLET ORAL 2 TIMES DAILY WITH MEALS
COMMUNITY
End: 2020-10-22 | Stop reason: SDUPTHER

## 2020-10-22 RX ORDER — ROSUVASTATIN CALCIUM 20 MG/1
20 TABLET, COATED ORAL DAILY
Qty: 90 TABLET | Refills: 3 | Status: SHIPPED | OUTPATIENT
Start: 2020-10-22 | End: 2021-10-08 | Stop reason: SINTOL

## 2020-10-22 NOTE — PROGRESS NOTES
Denies chest pain at current but does occasionally experience chest pain, palpitations, light headedness, dizziness, or AGATHA.    C/o of bilateral leg pain

## 2020-10-22 NOTE — PROGRESS NOTES
[address] [place] [address],[address]  [address] [place] [address]  Dept: [phone]  Dept Fax: [phone]  Loc: [phone]    Visit Date: 10/22/2020    Graciela Frazier is a 41 y.o. male who presents todayfor:  Chief Complaint   Patient presents with    6 Month Follow-Up    Coronary Artery Disease    Hypertension    Hyperlipidemia   sees Betito Maddy and LAD stent 2019  Intermittent chest pain   every few days   Usually at rest   Burn feeling   Lasts several minutes to hours  No triggers  No radiation   Looks like he does have higher BP today   Known risk factors for CAD  On statins with side effects   Aches and pains   No smoking       HPI:  HPI  Past Medical History:   Diagnosis Date    Hyperlipidemia     Hypertension       History reviewed. No pertinent surgical history. Family History   Problem Relation Age of Onset    Heart Disease Mother      Social History     Tobacco Use    Smoking status: Never Smoker    Smokeless tobacco: Never Used   Substance Use Topics    Alcohol use: Yes      Current Outpatient Medications   Medication Sig Dispense Refill    losartan-hydroCHLOROthiazide (HYZAAR) 50-12.5 MG per tablet take 1 tablet by mouth once daily 90 tablet 3    levothyroxine (SYNTHROID) 50 MCG tablet take 1 tablet by mouth once daily 90 tablet 1    atorvastatin (LIPITOR) 40 MG tablet Take 1 tablet by mouth nightly 30 tablet 3    Coenzyme Q10 (COQ10) 100 MG CAPS Take 100 mg by mouth daily 30 capsule 0    carvedilol (COREG) 3.125 MG tablet Take 1 tablet by mouth 2 times daily (with meals) 180 tablet 3    ticagrelor (BRILINTA) 90 MG TABS tablet Take 1 tablet by mouth 2 times daily 180 tablet 3    aspirin EC 81 MG EC tablet Take 1 tablet by mouth daily 90 tablet 1    nitroGLYCERIN (NITROSTAT) 0.4 MG SL tablet Place 1 tablet under the tongue every 5 minutes as needed for Chest pain up to max of 3 total doses.  If no relief after 1 dose, call 222. 88 tablet 3    clonazePAM (KLONOPIN) 0.5 MG tablet Take 1 tablet by mouth nightly as needed (insomnia) for up to 14 days. 14 tablet 0     No current facility-administered medications for this visit. No Known Allergies  Health Maintenance   Topic Date Due    HIV screen  03/31/1991    DTaP/Tdap/Td vaccine (1 - Tdap) 03/31/1995    Flu vaccine (1) 09/01/2020    Lipid screen  10/07/2021    Potassium monitoring  10/07/2021    Creatinine monitoring  10/07/2021    Hepatitis A vaccine  Aged Out    Hepatitis B vaccine  Aged Out    Hib vaccine  Aged Out    Meningococcal (ACWY) vaccine  Aged Out    Pneumococcal 0-64 years Vaccine  Aged Out       Subjective:  Review of Systems  General:   No fever, no chills, No fatigue or weight loss  Pulmonary:    some dyspnea, no wheezing  Cardiac:    Did have recent chest pain,   GI:     No nausea or vomiting, no abdominal pain  Neuro:     No dizziness or light headedness,   Musculoskeletal:  No recent active issues  Extremities:   No edema, no obvious claudication       Objective:  Physical Exam  BP (!) 160/100   Pulse 92   Ht 5' 10\" (1.778 m)   Wt 200 lb 6.4 oz (90.9 kg)   BMI 28.75 kg/m²   General:   Well developed, well nourished  Lungs:    Clear to auscultation  Heart:    Normal S1 S2, Slight murmur. no rubs, no gallops  Abdomen:   Soft, non tender, no organomegalies, positive bowel sounds  Extremities:   No edema, no cyanosis, good peripheral pulses  Neurological:   Awake, alert, oriented. No obvious focal deficits  Musculoskelatal:  No obvious deformities    Assessment:      Diagnosis Orders   1. Coronary artery disease involving native coronary artery of native heart with angina pectoris ([address]     2. Essential hypertension     3. Familial hypercholesterolemia     as above  Atypical chest pain  Higher BP  Some statins issues       Plan:  No follow-ups on file.   As above  Increase coreg to 6.25 bid  Add nitrates  Change to crestor or pravachol   consider a stress test

## 2020-11-18 ENCOUNTER — TELEPHONE (OUTPATIENT)
Dept: CARDIOLOGY CLINIC | Age: 45
End: 2020-11-18

## 2020-11-18 NOTE — TELEPHONE ENCOUNTER
Left several messages for the patient to call me to get stress test scheduled.   He is  Not returning my calls

## 2020-11-30 RX ORDER — CEFDINIR 300 MG/1
300 CAPSULE ORAL 2 TIMES DAILY
Qty: 20 CAPSULE | Refills: 0 | Status: SHIPPED | OUTPATIENT
Start: 2020-11-30 | End: 2020-12-15 | Stop reason: SDUPTHER

## 2020-12-14 RX ORDER — TICAGRELOR 90 MG/1
TABLET ORAL
Qty: 180 TABLET | Refills: 0 | Status: SHIPPED | OUTPATIENT
Start: 2020-12-14 | End: 2021-03-22 | Stop reason: SDUPTHER

## 2020-12-15 RX ORDER — CEFDINIR 300 MG/1
300 CAPSULE ORAL 2 TIMES DAILY
Qty: 28 CAPSULE | Refills: 0 | Status: SHIPPED | OUTPATIENT
Start: 2020-12-15 | End: 2020-12-29

## 2021-01-02 ENCOUNTER — HOSPITAL ENCOUNTER (EMERGENCY)
Age: 46
Discharge: HOME | End: 2021-01-02
Attending: EMERGENCY MEDICINE
Payer: COMMERCIAL

## 2021-01-02 ENCOUNTER — APPOINTMENT (OUTPATIENT)
Dept: GENERAL RADIOLOGY | Age: 46
End: 2021-01-02
Payer: COMMERCIAL

## 2021-01-02 VITALS
DIASTOLIC BLOOD PRESSURE: 104 MMHG | SYSTOLIC BLOOD PRESSURE: 131 MMHG | BODY MASS INDEX: 28.7 KG/M2 | WEIGHT: 200 LBS | RESPIRATION RATE: 18 BRPM | TEMPERATURE: 97.7 F | OXYGEN SATURATION: 94 % | HEART RATE: 101 BPM

## 2021-01-02 DIAGNOSIS — R07.9 CHEST PAIN, UNSPECIFIED TYPE: Primary | ICD-10-CM

## 2021-01-02 DIAGNOSIS — H92.01 OTALGIA OF RIGHT EAR: ICD-10-CM

## 2021-01-02 LAB
ALBUMIN SERPL-MCNC: 5 G/DL (ref 3.5–5.1)
ALP BLD-CCNC: 67 U/L (ref 38–126)
ALT SERPL-CCNC: 72 U/L (ref 11–66)
ANION GAP SERPL CALCULATED.3IONS-SCNC: 18 MEQ/L (ref 8–16)
AST SERPL-CCNC: 56 U/L (ref 5–40)
BASOPHILS # BLD: 0.7 %
BASOPHILS ABSOLUTE: 0 THOU/MM3 (ref 0–0.1)
BILIRUB SERPL-MCNC: 0.7 MG/DL (ref 0.3–1.2)
BUN BLDV-MCNC: 10 MG/DL (ref 7–22)
CALCIUM SERPL-MCNC: 11 MG/DL (ref 8.5–10.5)
CHLORIDE BLD-SCNC: 93 MEQ/L (ref 98–111)
CO2: 20 MEQ/L (ref 23–33)
CREAT SERPL-MCNC: 1.1 MG/DL (ref 0.4–1.2)
EKG ATRIAL RATE: 106 BPM
EKG P AXIS: 45 DEGREES
EKG P-R INTERVAL: 198 MS
EKG Q-T INTERVAL: 334 MS
EKG QRS DURATION: 94 MS
EKG QTC CALCULATION (BAZETT): 443 MS
EKG R AXIS: 60 DEGREES
EKG T AXIS: 16 DEGREES
EKG VENTRICULAR RATE: 106 BPM
EOSINOPHIL # BLD: 0.2 %
EOSINOPHILS ABSOLUTE: 0 THOU/MM3 (ref 0–0.4)
ERYTHROCYTE [DISTWIDTH] IN BLOOD BY AUTOMATED COUNT: 11.9 % (ref 11.5–14.5)
ERYTHROCYTE [DISTWIDTH] IN BLOOD BY AUTOMATED COUNT: 38.8 FL (ref 35–45)
GFR SERPL CREATININE-BSD FRML MDRD: 72 ML/MIN/1.73M2
GLUCOSE BLD-MCNC: 130 MG/DL (ref 70–108)
HCT VFR BLD CALC: 42.3 % (ref 42–52)
HEMOGLOBIN: 15.3 GM/DL (ref 14–18)
IMMATURE GRANS (ABS): 0.02 THOU/MM3 (ref 0–0.07)
IMMATURE GRANULOCYTES: 0.3 %
LIPASE: 26.5 U/L (ref 5.6–51.3)
LYMPHOCYTES # BLD: 17.7 %
LYMPHOCYTES ABSOLUTE: 1 THOU/MM3 (ref 1–4.8)
MAGNESIUM: 1.5 MG/DL (ref 1.6–2.4)
MCH RBC QN AUTO: 32 PG (ref 26–33)
MCHC RBC AUTO-ENTMCNC: 36.2 GM/DL (ref 32.2–35.5)
MCV RBC AUTO: 88.5 FL (ref 80–94)
MONOCYTES # BLD: 7.9 %
MONOCYTES ABSOLUTE: 0.5 THOU/MM3 (ref 0.4–1.3)
NUCLEATED RED BLOOD CELLS: 0 /100 WBC
OSMOLALITY CALCULATION: 263.5 MOSMOL/KG (ref 275–300)
PLATELET # BLD: 270 THOU/MM3 (ref 130–400)
PMV BLD AUTO: 9.6 FL (ref 9.4–12.4)
POTASSIUM SERPL-SCNC: 4.2 MEQ/L (ref 3.5–5.2)
RBC # BLD: 4.78 MILL/MM3 (ref 4.7–6.1)
SEG NEUTROPHILS: 73.2 %
SEGMENTED NEUTROPHILS ABSOLUTE COUNT: 4.3 THOU/MM3 (ref 1.8–7.7)
SODIUM BLD-SCNC: 131 MEQ/L (ref 135–145)
TOTAL PROTEIN: 8.9 G/DL (ref 6.1–8)
TROPONIN T: < 0.01 NG/ML
TROPONIN T: < 0.01 NG/ML
WBC # BLD: 5.9 THOU/MM3 (ref 4.8–10.8)

## 2021-01-02 PROCEDURE — 6360000002 HC RX W HCPCS

## 2021-01-02 PROCEDURE — 83690 ASSAY OF LIPASE: CPT

## 2021-01-02 PROCEDURE — 99285 EMERGENCY DEPT VISIT HI MDM: CPT

## 2021-01-02 PROCEDURE — 2580000003 HC RX 258: Performed by: STUDENT IN AN ORGANIZED HEALTH CARE EDUCATION/TRAINING PROGRAM

## 2021-01-02 PROCEDURE — 84484 ASSAY OF TROPONIN QUANT: CPT

## 2021-01-02 PROCEDURE — 80053 COMPREHEN METABOLIC PANEL: CPT

## 2021-01-02 PROCEDURE — 6370000000 HC RX 637 (ALT 250 FOR IP): Performed by: STUDENT IN AN ORGANIZED HEALTH CARE EDUCATION/TRAINING PROGRAM

## 2021-01-02 PROCEDURE — 6360000002 HC RX W HCPCS: Performed by: STUDENT IN AN ORGANIZED HEALTH CARE EDUCATION/TRAINING PROGRAM

## 2021-01-02 PROCEDURE — 96375 TX/PRO/DX INJ NEW DRUG ADDON: CPT

## 2021-01-02 PROCEDURE — 85025 COMPLETE CBC W/AUTO DIFF WBC: CPT

## 2021-01-02 PROCEDURE — 71046 X-RAY EXAM CHEST 2 VIEWS: CPT

## 2021-01-02 PROCEDURE — 93005 ELECTROCARDIOGRAM TRACING: CPT | Performed by: EMERGENCY MEDICINE

## 2021-01-02 PROCEDURE — 83735 ASSAY OF MAGNESIUM: CPT

## 2021-01-02 PROCEDURE — 36415 COLL VENOUS BLD VENIPUNCTURE: CPT

## 2021-01-02 PROCEDURE — 96374 THER/PROPH/DIAG INJ IV PUSH: CPT

## 2021-01-02 PROCEDURE — 6370000000 HC RX 637 (ALT 250 FOR IP)

## 2021-01-02 RX ORDER — 0.9 % SODIUM CHLORIDE 0.9 %
1000 INTRAVENOUS SOLUTION INTRAVENOUS ONCE
Status: COMPLETED | OUTPATIENT
Start: 2021-01-02 | End: 2021-01-02

## 2021-01-02 RX ORDER — ONDANSETRON 2 MG/ML
4 INJECTION INTRAMUSCULAR; INTRAVENOUS ONCE
Status: COMPLETED | OUTPATIENT
Start: 2021-01-02 | End: 2021-01-02

## 2021-01-02 RX ORDER — NITROGLYCERIN 20 MG/100ML
20 INJECTION INTRAVENOUS CONTINUOUS
Status: DISCONTINUED | OUTPATIENT
Start: 2021-01-02 | End: 2021-01-02

## 2021-01-02 RX ORDER — ASPIRIN 81 MG/1
324 TABLET, CHEWABLE ORAL ONCE
Status: COMPLETED | OUTPATIENT
Start: 2021-01-02 | End: 2021-01-02

## 2021-01-02 RX ORDER — MORPHINE SULFATE 4 MG/ML
4 INJECTION, SOLUTION INTRAMUSCULAR; INTRAVENOUS ONCE
Status: COMPLETED | OUTPATIENT
Start: 2021-01-02 | End: 2021-01-02

## 2021-01-02 RX ORDER — NITROGLYCERIN 0.4 MG/1
TABLET SUBLINGUAL
Status: COMPLETED
Start: 2021-01-02 | End: 2021-01-02

## 2021-01-02 RX ORDER — NITROGLYCERIN 0.4 MG/1
0.4 TABLET SUBLINGUAL EVERY 5 MIN PRN
Status: DISCONTINUED | OUTPATIENT
Start: 2021-01-02 | End: 2021-01-03 | Stop reason: HOSPADM

## 2021-01-02 RX ORDER — ONDANSETRON 2 MG/ML
INJECTION INTRAMUSCULAR; INTRAVENOUS
Status: COMPLETED
Start: 2021-01-02 | End: 2021-01-02

## 2021-01-02 RX ADMIN — NITROGLYCERIN 0.4 MG: 0.4 TABLET, ORALLY DISINTEGRATING SUBLINGUAL at 20:05

## 2021-01-02 RX ADMIN — NITROGLYCERIN 0.4 MG: 0.4 TABLET, ORALLY DISINTEGRATING SUBLINGUAL at 20:00

## 2021-01-02 RX ADMIN — MORPHINE SULFATE 4 MG: 4 INJECTION, SOLUTION INTRAMUSCULAR; INTRAVENOUS at 21:11

## 2021-01-02 RX ADMIN — SODIUM CHLORIDE 1000 ML: 9 INJECTION, SOLUTION INTRAVENOUS at 20:03

## 2021-01-02 RX ADMIN — ASPIRIN 324 MG: 81 TABLET, CHEWABLE ORAL at 20:00

## 2021-01-02 RX ADMIN — NITROGLYCERIN 0.4 MG: 0.4 TABLET, ORALLY DISINTEGRATING SUBLINGUAL at 20:10

## 2021-01-02 RX ADMIN — ONDANSETRON 4 MG: 2 INJECTION INTRAMUSCULAR; INTRAVENOUS at 19:26

## 2021-01-02 ASSESSMENT — ENCOUNTER SYMPTOMS
WHEEZING: 0
ABDOMINAL PAIN: 0
BACK PAIN: 0
COUGH: 0
NAUSEA: 1
DIARRHEA: 0
VOMITING: 1
SINUS PAIN: 0
SHORTNESS OF BREATH: 0
SORE THROAT: 0
EYE REDNESS: 0
RHINORRHEA: 0

## 2021-01-02 ASSESSMENT — PAIN SCALES - GENERAL
PAINLEVEL_OUTOF10: 6
PAINLEVEL_OUTOF10: 5
PAINLEVEL_OUTOF10: 8
PAINLEVEL_OUTOF10: 7

## 2021-01-02 ASSESSMENT — PAIN DESCRIPTION - DESCRIPTORS: DESCRIPTORS: BURNING

## 2021-01-02 ASSESSMENT — PAIN DESCRIPTION - LOCATION: LOCATION: CHEST

## 2021-01-02 ASSESSMENT — PAIN DESCRIPTION - PAIN TYPE
TYPE: ACUTE PAIN
TYPE: ACUTE PAIN

## 2021-01-02 ASSESSMENT — PAIN DESCRIPTION - ORIENTATION: ORIENTATION: LEFT

## 2021-01-03 NOTE — ED PROVIDER NOTES
ATTENDING NOTE:    I supervised and discussed the history, physical exam and the management of this patient with the resident. I reviewed the resident's note and agree with the documented findings and plan of care. Please see my additional note. Patient presents for chest pain which has been ongoing for the past month, intermittent episodes which are substernal.  No radiation, nonpleuritic, nonpositional.  Patient has taken his home nitroglycerin without relief. He states he has discussed these chest pains with his cardiologist and they were felt to be due to anxiety. Patient also reports some loss of appetite today and has tried to take some Pedialyte, he states that at times when he is dehydrated he has had similar symptoms of chest discomfort. He denies palpitations or syncope. He does report some sensation of abdominal bloating. He denies diarrhea, black or bloody stools, hematemesis, fever. He states that he is supposed to be on coenzyme q 10 to help with his generalized muscle aches due to his statin but he did not get a refill on his prescription and did not  more over-the-counter. Patient also reports that he was drinking alcohol to celebrate [facility] and also had some alcohol yesterday during the football game. On exam, patient is also is reporting bleeding from his right external auditory canal.  On exam there is a tiny amount of dried blood at the external auditory canal, TM appears to be intact, no erythema/swelling/discharge of the external auditory canal.  Patient's cheeks appear mildly erythematous bilaterally. Heart is regular rate and rhythm, no murmurs rubs or gallops. Lungs are clear to auscultation bilaterally, no wheezes crackles or rhonchi. Abdomen is soft, nontender, nondistended with normoactive bowel sounds. Extremities reveal no clubbing, cyanosis, or edema.   EKG was reviewed, patient is in normal sinus rhythm, no ST elevation or depression, labs and x-ray are also reviewed. We will administer dose of nitroglycerin to patient here in addition to Zofran and IV fluids and monitor closely. Troponin negative X 2, patient feeling much better.   Recommended close follow up with his pcp and cardiologist.    Electronically verified by Kassandra Kenney MD  01/02/21 [id number]

## 2021-01-03 NOTE — ED TRIAGE NOTES
Pt to ER for evaluation of chest pain. states his pain started this morning. States he took nitro at 1400 with minimal relief. Rates pain at left chest at 7/10. Pt alert and oriented, resp easy and unlabored.
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
## 2021-01-04 ENCOUNTER — OFFICE VISIT (OUTPATIENT)
Dept: FAMILY MEDICINE CLINIC | Age: 46
End: 2021-01-04
Payer: COMMERCIAL

## 2021-01-04 VITALS
HEIGHT: 69 IN | HEART RATE: 100 BPM | WEIGHT: 201.4 LBS | RESPIRATION RATE: 16 BRPM | TEMPERATURE: 97.9 F | BODY MASS INDEX: 29.83 KG/M2 | OXYGEN SATURATION: 98 % | SYSTOLIC BLOOD PRESSURE: 124 MMHG | DIASTOLIC BLOOD PRESSURE: 70 MMHG

## 2021-01-04 DIAGNOSIS — J01.00 ACUTE MAXILLARY SINUSITIS, RECURRENCE NOT SPECIFIED: Primary | ICD-10-CM

## 2021-01-04 PROCEDURE — 99214 OFFICE O/P EST MOD 30 MIN: CPT | Performed by: FAMILY MEDICINE

## 2021-01-04 PROCEDURE — G8417 CALC BMI ABV UP PARAM F/U: HCPCS | Performed by: FAMILY MEDICINE

## 2021-01-04 PROCEDURE — G8427 DOCREV CUR MEDS BY ELIG CLIN: HCPCS | Performed by: FAMILY MEDICINE

## 2021-01-04 PROCEDURE — 1036F TOBACCO NON-USER: CPT | Performed by: FAMILY MEDICINE

## 2021-01-04 PROCEDURE — G8484 FLU IMMUNIZE NO ADMIN: HCPCS | Performed by: FAMILY MEDICINE

## 2021-01-04 PROCEDURE — 96372 THER/PROPH/DIAG INJ SC/IM: CPT | Performed by: FAMILY MEDICINE

## 2021-01-04 RX ORDER — METHYLPREDNISOLONE ACETATE 80 MG/ML
160 INJECTION, SUSPENSION INTRA-ARTICULAR; INTRALESIONAL; INTRAMUSCULAR; SOFT TISSUE ONCE
Status: COMPLETED | OUTPATIENT
Start: 2021-01-04 | End: 2021-01-04

## 2021-01-04 RX ORDER — PANTOPRAZOLE SODIUM 40 MG/1
40 TABLET, DELAYED RELEASE ORAL
Qty: 30 TABLET | Refills: 2 | Status: SHIPPED | OUTPATIENT
Start: 2021-01-04 | End: 2021-04-05

## 2021-01-04 RX ORDER — DOXYCYCLINE HYCLATE 100 MG
100 TABLET ORAL 2 TIMES DAILY
Qty: 42 TABLET | Refills: 0 | Status: SHIPPED | OUTPATIENT
Start: 2021-01-04 | End: 2021-01-29 | Stop reason: SDUPTHER

## 2021-01-04 RX ADMIN — METHYLPREDNISOLONE ACETATE 160 MG: 80 INJECTION, SUSPENSION INTRA-ARTICULAR; INTRALESIONAL; INTRAMUSCULAR; SOFT TISSUE at 12:17

## 2021-01-04 SDOH — ECONOMIC STABILITY: INCOME INSECURITY: HOW HARD IS IT FOR YOU TO PAY FOR THE VERY BASICS LIKE FOOD, HOUSING, MEDICAL CARE, AND HEATING?: NOT HARD AT ALL

## 2021-01-04 SDOH — ECONOMIC STABILITY: TRANSPORTATION INSECURITY
IN THE PAST 12 MONTHS, HAS THE LACK OF TRANSPORTATION KEPT YOU FROM MEDICAL APPOINTMENTS OR FROM GETTING MEDICATIONS?: NO

## 2021-01-04 SDOH — ECONOMIC STABILITY: FOOD INSECURITY: WITHIN THE PAST 12 MONTHS, THE FOOD YOU BOUGHT JUST DIDN'T LAST AND YOU DIDN'T HAVE MONEY TO GET MORE.: NEVER TRUE

## 2021-01-04 ASSESSMENT — ENCOUNTER SYMPTOMS
ABDOMINAL PAIN: 0
FACIAL SWELLING: 1
EYE PAIN: 0
SINUS PAIN: 1
ABDOMINAL DISTENTION: 0
CONSTIPATION: 0
RHINORRHEA: 0
DIARRHEA: 0
SINUS PRESSURE: 1
SORE THROAT: 0
COUGH: 0
NAUSEA: 0
SHORTNESS OF BREATH: 0

## 2021-01-04 ASSESSMENT — PATIENT HEALTH QUESTIONNAIRE - PHQ9
2. FEELING DOWN, DEPRESSED OR HOPELESS: 0
1. LITTLE INTEREST OR PLEASURE IN DOING THINGS: 0
SUM OF ALL RESPONSES TO PHQ QUESTIONS 1-9: 0
SUM OF ALL RESPONSES TO PHQ QUESTIONS 1-9: 0

## 2021-01-04 NOTE — PROGRESS NOTES
Administrations This Visit     methylPREDNISolone acetate (DEPO-MEDROL) injection 160 mg     Admin Date  01/04/2021  12:17 Action  Given Dose  160 mg Route  Intramuscular Site  Deltoid Left Administered By  Brigid Licona RN    Ordering Provider: Nessa Haley MD    NDC: 0970-4316-81    Lot#: [id number]    : [employer]    Patient Supplied?: No

## 2021-01-05 NOTE — PROGRESS NOTES
[address] [place] [facility] [address]  Dept: [phone]  Dept Fax: [phone]  Loc: [phone]  PROGRESS NOTE      VisitDate: 1/4/2021    Denzil Harada is a 41 y.o. male who presents today for:     Chief Complaint   Patient presents with    Headache     head pressure left side of head, 1mo ago woke up to blood on pillow-took 6hrs to stop-denies pain at the time-symptoms got better while taking antibiotic, came back after stopping, having problems sleeping    ED Follow-up     CP-testing ok, not feeling well, not eating well-some vomitting         Subjective:  HPI  Patient comes in with continued right ear and sinus pain and pressure.'s been present for weeks. Had 2 rounds of Omnicef. He felt it was resolved after the first round but after he completed the antibiotics his symptoms came back second round of [address] only lightened his symptoms some and it has returned as he completed the medication. He is having trouble sleeping from this. When initially started he had some bleeding from his right ear but felt relief of the pressure when that occurred likely representing a tympanic membrane rupture. He is continue to have pain that has been significant causing some anxiety. He had chest pain that was similar to his previous MI presented to the ER where work-up was negative. ER records reviewed    Review of Systems   Constitutional: Negative for appetite change and fever. HENT: Positive for congestion, ear discharge, ear pain, facial swelling, sinus pressure and sinus pain. Negative for postnasal drip, rhinorrhea and sore throat. Eyes: Negative for pain and visual disturbance. Respiratory: Negative for cough and shortness of breath. Cardiovascular: Negative for chest pain. Gastrointestinal: Negative for abdominal distention, abdominal pain, constipation, diarrhea and nausea. Genitourinary: Negative for dysuria, frequency and urgency. Musculoskeletal: Negative for arthralgias. Skin: Negative for rash. Neurological: Negative for dizziness. Past Medical History:   Diagnosis Date    Hyperlipidemia     Hypertension       History reviewed. No pertinent surgical history. Family History   Problem Relation Age of Onset    Heart Disease Mother      Social History     Tobacco Use    Smoking status: Never Smoker    Smokeless tobacco: Never Used   Substance Use Topics    Alcohol use: Yes     Comment: few drinks a few days      Current Outpatient Medications   Medication Sig Dispense Refill    doxycycline hyclate (VIBRA-TABS) 100 MG tablet Take 1 tablet by mouth 2 times daily for 21 days 42 tablet 0    pantoprazole (PROTONIX) 40 MG tablet Take 1 tablet by mouth every morning (before breakfast) 30 tablet 2    BRILINTA 90 MG TABS tablet take 1 tablet by mouth twice a day 180 tablet 0    rosuvastatin (CRESTOR) 20 MG tablet Take 1 tablet by mouth daily 90 tablet 3    carvedilol (COREG) 6.25 MG tablet Take 1 tablet by mouth 2 times daily (with meals) 180 tablet 3    losartan-hydroCHLOROthiazide (HYZAAR) 50-12.5 MG per tablet take 1 tablet by mouth once daily 90 tablet 3    levothyroxine (SYNTHROID) 50 MCG tablet take 1 tablet by mouth once daily 90 tablet 1    Coenzyme Q10 (COQ10) 100 MG CAPS Take 100 mg by mouth daily 30 capsule 0    aspirin EC 81 MG EC tablet Take 1 tablet by mouth daily 90 tablet 1    nitroGLYCERIN (NITROSTAT) 0.4 MG SL tablet Place 1 tablet under the tongue every 5 minutes as needed for Chest pain up to max of 3 total doses. If no relief after 1 dose, call 911. 25 tablet 3     No current facility-administered medications for this visit.       No Known Allergies  Health Maintenance   Topic Date Due    Hepatitis C screen  1975    HIV screen  03/31/1991    DTaP/Tdap/Td vaccine (1 - Tdap) 03/31/1995    Diabetes screen  03/31/2016  Flu vaccine (1) 09/01/2020    Lipid screen  10/07/2021    Potassium monitoring  01/02/2022    Creatinine monitoring  01/02/2022    Hepatitis A vaccine  Aged Out    Hepatitis B vaccine  Aged Out    Hib vaccine  Aged Out    Meningococcal (ACWY) vaccine  Aged Out    Pneumococcal 0-64 years Vaccine  Aged Out         Objective:     Physical Exam  Constitutional:       General: He is not in acute distress. Appearance: He is well-developed. He is not ill-appearing or diaphoretic. HENT:      Head: Normocephalic and atraumatic. Comments: Right maxillary sinus tenderness bilateral serous effusions, anterior cervical adenopathy     Right Ear: External ear normal.      Left Ear: External ear normal.   Eyes:      Conjunctiva/sclera: Conjunctivae normal.   Neck:      Vascular: No JVD. Cardiovascular:      Rate and Rhythm: Normal rate and regular rhythm. Heart sounds: Normal heart sounds. Pulmonary:      Effort: Pulmonary effort is normal.      Breath sounds: Normal breath sounds. No wheezing or rales. Musculoskeletal:         General: No tenderness. Lymphadenopathy:      Cervical: Cervical adenopathy present. Skin:     General: Skin is warm and dry. Coloration: Skin is not pale. Neurological:      Mental Status: He is alert and oriented to person, place, and time. /70 (Site: Left Upper Arm)   Pulse 100   Temp 97.9 °F (36.6 °C) (Infrared)   Resp 16   Ht 5' 9\" (1.753 m)   Wt 201 lb 6.4 oz (91.4 kg)   SpO2 98%   BMI 29.74 kg/m²       Impression/Plan:  1. Acute maxillary sinusitis, recurrence not specified      Requested Prescriptions     Signed Prescriptions Disp Refills    doxycycline hyclate (VIBRA-TABS) 100 MG tablet 42 tablet 0     Sig: Take 1 tablet by mouth 2 times daily for 21 days    pantoprazole (PROTONIX) 40 MG tablet 30 tablet 2     Sig: Take 1 tablet by mouth every morning (before breakfast)     No orders of the defined types were placed in this encounter. Potential for silent GERD discussed with the patient recommended dietary changes start Protonix daily. 3 weeks of antibiotics if there is no improvement after a couple weeks or symptoms fail to resolve he will notify us we will proceed with CT scan of the sinuses. 30 minutes face-to-face time over 50 that time spent on counseling reviewing records discussing treatment plan medications side effects and dietary and lifestyle changes    Patient giveneducational materials - see patient instructions. Discussed use, benefit, and side effects of prescribed medications. All patient questions answered. Pt voiced understanding. Reviewed health maintenance. Patient agreedwith treatment plan. Follow up as directed. **This report has been created using voice recognition software. It may contain minor errorswhich are inherent in voice recognition technology. **       Electronically signed by Ilya Iglesias MD on 1/4/2021 at 10:21 PM

## 2021-01-21 ENCOUNTER — TELEPHONE (OUTPATIENT)
Dept: FAMILY MEDICINE CLINIC | Age: 46
End: 2021-01-21

## 2021-01-21 DIAGNOSIS — H92.01 RIGHT EAR PAIN: Primary | ICD-10-CM

## 2021-01-21 NOTE — TELEPHONE ENCOUNTER
Patient called Dr Nissa Webber c/o continued right ear pain. Requesting referral to ENT. Referral ordered per verbal order from Dr Nissa Webber.

## 2021-01-29 RX ORDER — DOXYCYCLINE HYCLATE 100 MG
100 TABLET ORAL 2 TIMES DAILY
Qty: 42 TABLET | Refills: 0 | Status: SHIPPED | OUTPATIENT
Start: 2021-01-29 | End: 2021-02-19

## 2021-02-11 ENCOUNTER — OFFICE VISIT (OUTPATIENT)
Dept: ENT CLINIC | Age: 46
End: 2021-02-11
Payer: COMMERCIAL

## 2021-02-11 VITALS
SYSTOLIC BLOOD PRESSURE: 122 MMHG | DIASTOLIC BLOOD PRESSURE: 72 MMHG | RESPIRATION RATE: 14 BRPM | HEIGHT: 70 IN | TEMPERATURE: 97.5 F | HEART RATE: 86 BPM | WEIGHT: 191.9 LBS | BODY MASS INDEX: 27.47 KG/M2

## 2021-02-11 DIAGNOSIS — M79.605 LEG PAIN, BILATERAL: ICD-10-CM

## 2021-02-11 DIAGNOSIS — R06.83 SNORING: ICD-10-CM

## 2021-02-11 DIAGNOSIS — J34.2 DEVIATED NASAL SEPTUM: ICD-10-CM

## 2021-02-11 DIAGNOSIS — H92.21 OTORRHAGIA OF RIGHT EAR: ICD-10-CM

## 2021-02-11 DIAGNOSIS — H92.03 OTALGIA, BILATERAL: Primary | ICD-10-CM

## 2021-02-11 DIAGNOSIS — M79.604 LEG PAIN, BILATERAL: ICD-10-CM

## 2021-02-11 DIAGNOSIS — F45.8 TEETH GRINDING: ICD-10-CM

## 2021-02-11 DIAGNOSIS — H61.893 DRY BOTH EAR CANALS: ICD-10-CM

## 2021-02-11 DIAGNOSIS — Z79.01 ANTICOAGULATED: ICD-10-CM

## 2021-02-11 PROCEDURE — G8484 FLU IMMUNIZE NO ADMIN: HCPCS | Performed by: OTOLARYNGOLOGY

## 2021-02-11 PROCEDURE — 99203 OFFICE O/P NEW LOW 30 MIN: CPT | Performed by: OTOLARYNGOLOGY

## 2021-02-11 PROCEDURE — 92504 EAR MICROSCOPY EXAMINATION: CPT | Performed by: OTOLARYNGOLOGY

## 2021-02-11 PROCEDURE — G8427 DOCREV CUR MEDS BY ELIG CLIN: HCPCS | Performed by: OTOLARYNGOLOGY

## 2021-02-11 PROCEDURE — 1036F TOBACCO NON-USER: CPT | Performed by: OTOLARYNGOLOGY

## 2021-02-11 PROCEDURE — G8417 CALC BMI ABV UP PARAM F/U: HCPCS | Performed by: OTOLARYNGOLOGY

## 2021-02-11 ASSESSMENT — ENCOUNTER SYMPTOMS
ABDOMINAL PAIN: 0
CHEST TIGHTNESS: 0
SORE THROAT: 0
CHOKING: 0
TROUBLE SWALLOWING: 0
VOICE CHANGE: 0
COLOR CHANGE: 0
SHORTNESS OF BREATH: 0
SINUS PRESSURE: 0
RHINORRHEA: 0
WHEEZING: 0
STRIDOR: 0
VOMITING: 0
DIARRHEA: 0
FACIAL SWELLING: 0
APNEA: 0
COUGH: 0
NAUSEA: 0

## 2021-02-11 NOTE — PROGRESS NOTES
mouth at times. At times he feels a rush of cold air in his ear. Has pressure in left ear too but not compared to the right ear. No drainage no dripping, ears always feels like fluid in ear. No dental work or issues. His hearing is a little less in the right ear, describes it as cloudy hearing. He uses Q-tips regularly. He did not just clean ears when the trouble started. Yesterday morning he was laying in bed and he heard a crackling in his ear. Has had headaches that comes and goes. Was on Lipitor when had ear bleeding, was switched to Crestor in December. He has leg pain. He doesn't have trouble breathing through his nose. November 2019 he had a heart attack and 3 stents put in, has history of Coronary artery disease in family. He is not taking Omega 3. States it was recommended to him but he didn't get it. He is taking CoQ10.  200 mg daily. He is a .      History:     No Known Allergies  Current Outpatient Medications   Medication Sig Dispense Refill    pantoprazole (PROTONIX) 40 MG tablet Take 1 tablet by mouth every morning (before breakfast) 30 tablet 2    BRILINTA 90 MG TABS tablet take 1 tablet by mouth twice a day 180 tablet 0    rosuvastatin (CRESTOR) 20 MG tablet Take 1 tablet by mouth daily 90 tablet 3    carvedilol (COREG) 6.25 MG tablet Take 1 tablet by mouth 2 times daily (with meals) 180 tablet 3    losartan-hydroCHLOROthiazide (HYZAAR) 50-12.5 MG per tablet take 1 tablet by mouth once daily 90 tablet 3    levothyroxine (SYNTHROID) 50 MCG tablet take 1 tablet by mouth once daily 90 tablet 1    Coenzyme Q10 (COQ10) 100 MG CAPS Take 100 mg by mouth daily 30 capsule 0    aspirin EC 81 MG EC tablet Take 1 tablet by mouth daily 90 tablet 1    nitroGLYCERIN (NITROSTAT) 0.4 MG SL tablet Place 1 tablet under the tongue every 5 minutes as needed for Chest pain up to max of 3 total doses.  If no relief after 1 dose, call 911. 25 tablet 3     No current facility-administered medications for this visit. Past Medical History:   Diagnosis Date    Hyperlipidemia     Hypertension       Past Surgical History:   Procedure Laterality Date    CAROTID STENT PLACEMENT  11/2019    x 3     Family History   Problem Relation Age of Onset    Heart Disease Mother      Social History     Tobacco Use    Smoking status: Never Smoker    Smokeless tobacco: Never Used   Substance Use Topics    Alcohol use: Yes     Comment: few drinks a few days       Subjective:       Review of Systems   Constitutional: Negative for activity change, appetite change, chills, diaphoresis, fatigue, fever and unexpected weight change. HENT: Negative for congestion, dental problem, ear discharge, ear pain, facial swelling, hearing loss, mouth sores, nosebleeds, postnasal drip, rhinorrhea, sinus pressure, sneezing, sore throat, tinnitus, trouble swallowing and voice change. Eyes: Negative for visual disturbance. Respiratory: Negative for apnea, cough, choking, chest tightness, shortness of breath, wheezing and stridor. Cardiovascular: Negative for chest pain, palpitations and leg swelling. Gastrointestinal: Negative for abdominal pain, diarrhea, nausea and vomiting. Endocrine: Negative for cold intolerance, heat intolerance, polydipsia and polyuria. Genitourinary: Negative for dysuria, enuresis and hematuria. Musculoskeletal: Negative for arthralgias, gait problem, neck pain and neck stiffness. Skin: Negative for color change and rash. Allergic/Immunologic: Negative for environmental allergies, food allergies and immunocompromised state. Neurological: Negative for dizziness, syncope, facial asymmetry, speech difficulty, light-headedness and headaches. Hematological: Negative for adenopathy. Does not bruise/bleed easily. Psychiatric/Behavioral: Negative for confusion and sleep disturbance. The patient is not nervous/anxious.         Objective:     /72 (Site: Left Upper Arm, Position: Sitting)   Pulse 86   Temp 97.5 °F (36.4 °C) (Infrared)   Resp 14   Ht 5' 9.5\" (1.765 m)   Wt 191 lb 14.4 oz (87 kg)   BMI 27.93 kg/m²     Physical Exam  Vitals signs and nursing note reviewed. Constitutional:       Appearance: He is well-developed. HENT:      Head: Normocephalic and atraumatic. No laceration. Comments:        Right Ear: Hearing, ear canal and external ear normal. No drainage or swelling. No middle ear effusion. Tympanic membrane is not perforated or erythematous. Left Ear: Hearing, tympanic membrane, ear canal and external ear normal. No drainage or swelling. No middle ear effusion. Tympanic membrane is not perforated or erythematous. Ears:      Comments: Ears: Aerated, normal looking eardrum. Some blood clot in the ear canal on the right      Nose: Septal deviation (left deviated nasal septum 3+ hypertrophy right inferior turbinate) present. No mucosal edema or rhinorrhea. Mouth/Throat:      Mouth: Mucous membranes are not pale and not dry. No oral lesions. Pharynx: Oropharynx is clear. Uvula midline. No oropharyngeal exudate or posterior oropharyngeal erythema. Tonsils: 1+ on the right. 1+ on the left. Comments: LIps: lips normal     Mallampati 1  Base of tongue: symmetric,  Good occlusion  Eyes:      Extraocular Movements:      Right eye: Normal extraocular motion and no nystagmus. Left eye: Normal extraocular motion and no nystagmus. Comments: Conjugate gaze   Neck:      Musculoskeletal: Neck supple. Thyroid: No thyroid mass or thyromegaly. Trachea: Phonation normal. No tracheal deviation. Comments:   Salivary glands not enlarged and normal to palpation    Pulmonary:      Effort: Pulmonary effort is normal. No retractions. Breath sounds: No stridor. Neurological:      Mental Status: He is alert and oriented to person, place, and time.       Cranial Nerves: No cranial nerve deficit (VIIth N function intact bilat). Psychiatric:         Mood and Affect: Mood and affect normal.         Behavior: Behavior is cooperative. Binocular Microscopic Exam:   Right ear: little strand of blood on canal.  Tympanic membrane normally mobile    Data:  All of the past medical history, past surgical history, family history,social history, allergies and current medications were reviewed with the patient. Assessment & Plan   Diagnoses and all orders for this visit:     Diagnosis Orders   1. Otalgia, bilateral  UT EAR MICROSCOPY EXAMINATION   2. Otorrhagia of right ear  UT EAR MICROSCOPY EXAMINATION   3. Dry both ear canals     4. Snoring     5. Deviated nasal septum     6. Teeth grinding     7. Leg pain, bilateral     8. Anticoagulated         The findings were explained and his questions were answered. Bleeding is probably related to minimal manipulation in the presence of the Brilinta. He is instructed to keep everything out of his ear except perhaps sweet oil. Continue the antibiotic as prescribed from PCP. He can use sweet oil in his ear. 1 month follow up       I, Cholo Garner CMA ([facility]), am scribing for, and in the presence of Dr. Leonardo De Leon. Electronically signed by Adeel Kowalski CMA ([facility]) on 2/11/21 at 1:46 PM EST. (Please note that portions of this note were completed with a voice recognition program. Efforts were made to edit the dictations butoccasionally words are mis-transcribed.)    I agree to the above documentation placed by my scribe. I have personally evaluated this patient. Additional findings are as noted. I reviewed the scribe's note and agree with the documented findings and plan of care. Any areas of disagreement are corrected. I agree with the chief complaint, past medical history, past surgical history, allergies, medications, social and family history as documented unless otherwise noted below.      Electronically signed by Thai Rodriguez MD on 2/22/2021 at 10:35 PM

## 2021-02-27 DIAGNOSIS — R79.89 ELEVATED TSH: ICD-10-CM

## 2021-03-01 RX ORDER — LEVOTHYROXINE SODIUM 0.05 MG/1
TABLET ORAL
Qty: 90 TABLET | Refills: 1 | Status: SHIPPED | OUTPATIENT
Start: 2021-03-01 | End: 2021-08-30

## 2021-03-11 ENCOUNTER — OFFICE VISIT (OUTPATIENT)
Dept: ENT CLINIC | Age: 46
End: 2021-03-11
Payer: COMMERCIAL

## 2021-03-11 VITALS
DIASTOLIC BLOOD PRESSURE: 76 MMHG | HEART RATE: 66 BPM | WEIGHT: 188 LBS | SYSTOLIC BLOOD PRESSURE: 118 MMHG | TEMPERATURE: 97.3 F | BODY MASS INDEX: 27.36 KG/M2 | RESPIRATION RATE: 16 BRPM

## 2021-03-11 DIAGNOSIS — R53.83 TIRED: ICD-10-CM

## 2021-03-11 DIAGNOSIS — J34.2 DEVIATED NASAL SEPTUM: ICD-10-CM

## 2021-03-11 DIAGNOSIS — J32.2 CHRONIC ETHMOIDAL SINUSITIS: Primary | ICD-10-CM

## 2021-03-11 DIAGNOSIS — R51.9 RHINOGENIC HEADACHE: ICD-10-CM

## 2021-03-11 DIAGNOSIS — R06.83 SNORING: ICD-10-CM

## 2021-03-11 DIAGNOSIS — J34.1 MAXILLARY SINUS CYST: ICD-10-CM

## 2021-03-11 DIAGNOSIS — R44.8 FACIAL PRESSURE: ICD-10-CM

## 2021-03-11 PROCEDURE — G8417 CALC BMI ABV UP PARAM F/U: HCPCS | Performed by: OTOLARYNGOLOGY

## 2021-03-11 PROCEDURE — G8427 DOCREV CUR MEDS BY ELIG CLIN: HCPCS | Performed by: OTOLARYNGOLOGY

## 2021-03-11 PROCEDURE — 99214 OFFICE O/P EST MOD 30 MIN: CPT | Performed by: OTOLARYNGOLOGY

## 2021-03-11 PROCEDURE — 1036F TOBACCO NON-USER: CPT | Performed by: OTOLARYNGOLOGY

## 2021-03-11 PROCEDURE — G8484 FLU IMMUNIZE NO ADMIN: HCPCS | Performed by: OTOLARYNGOLOGY

## 2021-03-11 RX ORDER — FLUTICASONE PROPIONATE 50 MCG
1 SPRAY, SUSPENSION (ML) NASAL DAILY
Qty: 1 BOTTLE | Refills: 3 | Status: SHIPPED | OUTPATIENT
Start: 2021-03-11 | End: 2021-06-03 | Stop reason: ALTCHOICE

## 2021-03-11 ASSESSMENT — ENCOUNTER SYMPTOMS
VOICE CHANGE: 0
TROUBLE SWALLOWING: 0
COLOR CHANGE: 0
RHINORRHEA: 0
WHEEZING: 0
COUGH: 0
FACIAL SWELLING: 0
ABDOMINAL PAIN: 0
APNEA: 0
NAUSEA: 0
DIARRHEA: 0
SINUS PRESSURE: 0
SHORTNESS OF BREATH: 0
VOMITING: 0
CHOKING: 0
STRIDOR: 0
CHEST TIGHTNESS: 0
SORE THROAT: 0

## 2021-03-11 NOTE — PROGRESS NOTES
[facility] [place] [facility]  [facility] EAR, NOSE AND THROAT  [facility] [place]  Dept: [phone]  Dept Fax: [phone]  Loc: [phone]    Mily Lobo is a 41 y.o. male who was referred byNo ref. provider found for:  Chief Complaint   Patient presents with    Follow-up     Patient is here for one month follow up to check RT ear and some pressure in the LT ear. Sherley Nugent HPI:     Mily Lobo is a 41 y.o. male who presents today for 1 month follow up to check Right ear and some pressure in the Left ear. Prior head CT is reviewed. Deviated nasal septum 3+ to the left, 4+ hypertrophy right inferior turbinate, franck bullosa right middle turbinate. Polyp or cyst in maxillary sinus. His ear has not bleed, it feels like there is liquid coming out and there is nothing coming out. Hearing is a little effected. When his ear bled he had no pain. Last couple of days his face has been feeling tingly. He has trouble breathing through his nose. His nose has been running the last few days but it's clear. He snores, tired all the time and does not sleep well. He finished 6 weeks of doxycycline. He has not done sinus irrigations. He is taking a statin drug. Has problems with legs started with his legs when he started on the statin drug. He is taking CoQ10 200 mg a day. His doctor told him to take that milligram.  He switched to a different one and he llamas not think it is making a difference. He has also been taking   L- caranitine and D-ribose. He was also started on Crestor 20 mg a day instead of Lipitor. His cardiologist said the next step might be Repatha. He is taking synthroid. His two sisters had his thyroid removed. States it flares up, hard to swallow when drinking water. He is taking an acid pill to help with reflux. Was put on it in December. He is taking Losartan. He had 3 stents put in on November 22nd, 2019. History:     No Known Allergies  Current Outpatient Medications   Medication Sig Dispense Refill    fluticasone (FLONASE) 50 MCG/ACT nasal spray 1 spray by Nasal route daily 1 Bottle 3    levothyroxine (SYNTHROID) 50 MCG tablet take 1 tablet by mouth once daily 90 tablet 1    pantoprazole (PROTONIX) 40 MG tablet Take 1 tablet by mouth every morning (before breakfast) 30 tablet 2    BRILINTA 90 MG TABS tablet take 1 tablet by mouth twice a day 180 tablet 0    rosuvastatin (CRESTOR) 20 MG tablet Take 1 tablet by mouth daily 90 tablet 3    carvedilol (COREG) 6.25 MG tablet Take 1 tablet by mouth 2 times daily (with meals) 180 tablet 3    losartan-hydroCHLOROthiazide (HYZAAR) 50-12.5 MG per tablet take 1 tablet by mouth once daily 90 tablet 3    Coenzyme Q10 (COQ10) 100 MG CAPS Take 100 mg by mouth daily 30 capsule 0    aspirin EC 81 MG EC tablet Take 1 tablet by mouth daily 90 tablet 1    nitroGLYCERIN (NITROSTAT) 0.4 MG SL tablet Place 1 tablet under the tongue every 5 minutes as needed for Chest pain up to max of 3 total doses. If no relief after 1 dose, call 911. 21 tablet 3     No current facility-administered medications for this visit. Past Medical History:   Diagnosis Date    Hyperlipidemia     Hypertension       Past Surgical History:   Procedure Laterality Date    CAROTID STENT PLACEMENT  11/2019    x 3     Family History   Problem Relation Age of Onset    Heart Disease Mother      Social History     Tobacco Use    Smoking status: Never Smoker    Smokeless tobacco: Never Used   Substance Use Topics    Alcohol use: Yes     Comment: few drinks a few days       Subjective:       Review of Systems   Constitutional: Negative for activity change, appetite change, chills, diaphoresis, fatigue, fever and unexpected weight change.    HENT: Negative for congestion, dental problem, ear discharge, ear pain, facial swelling, hearing loss, mouth sores, nosebleeds, postnasal drip, rhinorrhea, sinus pressure, sneezing, sore throat, tinnitus, trouble swallowing and voice change. Eyes: Negative for visual disturbance. Respiratory: Negative for apnea, cough, choking, chest tightness, shortness of breath, wheezing and stridor. Cardiovascular: Negative for chest pain, palpitations and leg swelling. Gastrointestinal: Negative for abdominal pain, diarrhea, nausea and vomiting. Endocrine: Negative for cold intolerance, heat intolerance, polydipsia and polyuria. Genitourinary: Negative for dysuria, enuresis and hematuria. Musculoskeletal: Negative for arthralgias, gait problem, neck pain and neck stiffness. Skin: Negative for color change and rash. Allergic/Immunologic: Negative for environmental allergies, food allergies and immunocompromised state. Neurological: Negative for dizziness, syncope, facial asymmetry, speech difficulty, light-headedness and headaches. Hematological: Negative for adenopathy. Does not bruise/bleed easily. Psychiatric/Behavioral: Negative for confusion and sleep disturbance. The patient is not nervous/anxious. Objective:     /76 (Site: Left Upper Arm, Position: Sitting)   Pulse 66   Temp 97.3 °F (36.3 °C) (Infrared)   Resp 16   Wt 188 lb (85.3 kg)   BMI 27.36 kg/m²     Physical Exam  Ears have normal appearance  Deviated nasal septum 3+ to the left  4+ hypertrophy right inferior turbinate       Data:  All of the past medical history, past surgical history, family history,social history, allergies and current medications were reviewed with the patient. Assessment & Plan   Diagnoses and all orders for this visit:     Diagnosis Orders   1. Chronic ethmoidal sinusitis  CT FACIAL BONES WO CONTRAST   2. Deviated nasal septum  CT FACIAL BONES WO CONTRAST   3. Snoring     4. Facial pressure  CT FACIAL BONES WO CONTRAST   5. Rhinogenic headache  CT FACIAL BONES WO CONTRAST   6. Tired     7.  Maxillary sinus cyst  CT FACIAL BONES WO CONTRAST       The findings were explained and his questions were answered. He has significant nasal obstruction and they can contribute to station tube dysfunction. Options were discussed including obtaining a CT scan of his sinuses for evaluation of any persistent sinusitis and preoperative planning for correcting his nasal airway. He has significant obstruction. He has been on Doxycycline for 6 weeks and just finished. He uses Flonase. Return after sinus CT. He agreed. Adeline PANCHAL CMA ([facility]), am scribing for, and in the presence of Dr. Bekah Javier. Electronically signed by Joesph Branch CMA ([facility]) on 3/11/21 at 3:16 PM EST. (Please note that portions of this note were completed with a voice recognition program. Efforts were made to edit the dictations butoccasionally words are mis-transcribed.)    I agree to the above documentation placed by my scribe. I have personally evaluated this patient. Additional findings are as noted. I reviewed the scribe's note and agree with the documented findings and plan of care. Any areas of disagreement are corrected. I agree with the chief complaint, past medical history, past surgical history, allergies, medications, social and family history as documented unless otherwise noted below.      Electronically signed by Julia Chairez MD on 3/28/2021 at 3:21 PM

## 2021-04-05 RX ORDER — PANTOPRAZOLE SODIUM 40 MG/1
TABLET, DELAYED RELEASE ORAL
Qty: 90 TABLET | Refills: 2 | Status: SHIPPED | OUTPATIENT
Start: 2021-04-05 | End: 2021-12-21 | Stop reason: SDUPTHER

## 2021-04-07 RX ORDER — CARVEDILOL 6.25 MG/1
6.25 TABLET ORAL 2 TIMES DAILY WITH MEALS
Qty: 180 TABLET | Refills: 3 | Status: ON HOLD | OUTPATIENT
Start: 2021-04-07 | End: 2021-06-09 | Stop reason: HOSPADM

## 2021-04-13 ENCOUNTER — HOSPITAL ENCOUNTER (OUTPATIENT)
Dept: CT IMAGING | Age: 46
Discharge: HOME | End: 2021-04-13
Payer: COMMERCIAL

## 2021-04-13 DIAGNOSIS — J34.1 MAXILLARY SINUS CYST: ICD-10-CM

## 2021-04-13 DIAGNOSIS — J32.2 CHRONIC ETHMOIDAL SINUSITIS: ICD-10-CM

## 2021-04-13 DIAGNOSIS — R51.9 RHINOGENIC HEADACHE: ICD-10-CM

## 2021-04-13 DIAGNOSIS — J34.2 DEVIATED NASAL SEPTUM: ICD-10-CM

## 2021-04-13 DIAGNOSIS — R44.8 FACIAL PRESSURE: ICD-10-CM

## 2021-04-13 PROCEDURE — 70486 CT MAXILLOFACIAL W/O DYE: CPT

## 2021-04-16 ENCOUNTER — TELEPHONE (OUTPATIENT)
Dept: CARDIOLOGY CLINIC | Age: 46
End: 2021-04-16

## 2021-04-16 ENCOUNTER — TELEPHONE (OUTPATIENT)
Dept: FAMILY MEDICINE CLINIC | Age: 46
End: 2021-04-16

## 2021-04-16 ENCOUNTER — OFFICE VISIT (OUTPATIENT)
Dept: ENT CLINIC | Age: 46
End: 2021-04-16
Payer: COMMERCIAL

## 2021-04-16 VITALS
SYSTOLIC BLOOD PRESSURE: 104 MMHG | RESPIRATION RATE: 14 BRPM | HEIGHT: 70 IN | DIASTOLIC BLOOD PRESSURE: 68 MMHG | WEIGHT: 190 LBS | TEMPERATURE: 97.6 F | HEART RATE: 68 BPM | BODY MASS INDEX: 27.2 KG/M2

## 2021-04-16 DIAGNOSIS — J34.89 OSTIOMEATAL COMPLEX OBSTRUCTION OF PARANASAL SINUS: ICD-10-CM

## 2021-04-16 DIAGNOSIS — Z79.01 ANTICOAGULATED: ICD-10-CM

## 2021-04-16 DIAGNOSIS — Z01.818 PRE-OP TESTING: Primary | ICD-10-CM

## 2021-04-16 DIAGNOSIS — H69.83 DYSFUNCTION OF BOTH EUSTACHIAN TUBES: ICD-10-CM

## 2021-04-16 DIAGNOSIS — J32.0 CHRONIC MAXILLARY SINUSITIS: ICD-10-CM

## 2021-04-16 DIAGNOSIS — J32.2 CHRONIC ETHMOIDAL SINUSITIS: ICD-10-CM

## 2021-04-16 DIAGNOSIS — R06.83 SNORING: ICD-10-CM

## 2021-04-16 DIAGNOSIS — R53.83 TIRED: ICD-10-CM

## 2021-04-16 DIAGNOSIS — J33.8 MAXILLARY SINUS POLYP: ICD-10-CM

## 2021-04-16 DIAGNOSIS — J34.89 CONCHA BULLOSA: ICD-10-CM

## 2021-04-16 DIAGNOSIS — J34.3 HYPERTROPHY OF NASAL TURBINATES: ICD-10-CM

## 2021-04-16 DIAGNOSIS — R51.9 RHINOGENIC HEADACHE: ICD-10-CM

## 2021-04-16 DIAGNOSIS — R44.8 FACIAL PRESSURE: ICD-10-CM

## 2021-04-16 DIAGNOSIS — J34.2 DEVIATED NASAL SEPTUM: Primary | ICD-10-CM

## 2021-04-16 PROCEDURE — G8417 CALC BMI ABV UP PARAM F/U: HCPCS | Performed by: OTOLARYNGOLOGY

## 2021-04-16 PROCEDURE — 1036F TOBACCO NON-USER: CPT | Performed by: OTOLARYNGOLOGY

## 2021-04-16 PROCEDURE — G8427 DOCREV CUR MEDS BY ELIG CLIN: HCPCS | Performed by: OTOLARYNGOLOGY

## 2021-04-16 PROCEDURE — 99215 OFFICE O/P EST HI 40 MIN: CPT | Performed by: OTOLARYNGOLOGY

## 2021-04-16 ASSESSMENT — ENCOUNTER SYMPTOMS
WHEEZING: 0
CHEST TIGHTNESS: 0
VOMITING: 0
DIARRHEA: 0
FACIAL SWELLING: 0
SORE THROAT: 0
STRIDOR: 0
COUGH: 0
VOICE CHANGE: 0
SINUS PRESSURE: 0
ABDOMINAL PAIN: 0
CHOKING: 0
RHINORRHEA: 0
NAUSEA: 0
COLOR CHANGE: 0
SHORTNESS OF BREATH: 0
TROUBLE SWALLOWING: 0
APNEA: 0

## 2021-04-16 NOTE — TELEPHONE ENCOUNTER
Scheduled May 20th at 8:00 am, he has orders for pre op testing and he will do those 2 days before the appt.

## 2021-04-16 NOTE — TELEPHONE ENCOUNTER
Patient is scheduled on June 9 with Dr. Ivelisse Hickman for an igs sinus surgery, partial ethmoidectomy, resection of franck bullosa, septoplasty and smr. Dr. Ivelisse Hickman is requesting clearance from Dr. Nancy Johansen. He has recommended that he hold losartan 2 days prior and 2 days post procedure, asa 1 week, brilinta 4 days and coreg for 1 week. Patient has an appt with Dr. Nancy Johansen on 04/26. Thank you!
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
## 2021-04-16 NOTE — LETTER
[address] and [address]  Phone: [phone]  Fax: Ricky Thayer MD        April 26, 2021    Sana Cullen MD  [address] [place] [id number]    Patient: Boo Horne   MR Number: [id number]   YOB: 1975   Date of Visit: 4/16/2021     Dear Sana Cullen,    I recently saw your patient, Boo Horne, regarding results of the CT scan following vigorous medical therapy. As you know he is a candidate for surgical intervention. He has the opportunity to have quite a few of rather unpleasant symptoms corrected. However he has significant heart disease and is on 2 anticoagulants, alpha blockers, and an angiotensin drug. All of these are problems when performing nasal and sinus surgery. This is covered in more depth below. Request that you review the information below regarding which medications need to be altered or stopped. Surgery was performed without nasal packing and clotting is essential.  This also is a tremendous safety factor in terms of the instant sleep apnea when gets with nasal packing, and the resultant stress on the heart. Below are the relevant portions of my assessment and plan of care. Assessment & Plan   Diagnoses and all orders for this visit:     Diagnosis Orders   1. Deviated nasal septum  CA REPAIR OF NASAL SEPTUM   2. Hypertrophy of nasal turbinates  CA EXCISION TURBINATE,SUBMUCOUS   3. Chronic ethmoidal sinusitis  IGS Endo Sinus Sx    CA NASAL/SINUS NDSC W/PARTIAL ETHMOIDECTOMY   4. Chronic maxillary sinusitis  IGS Endo Sinus Sx    CA NASAL SCOPY,OPEN MAXILL SINUS    CA NASAL SCOPY,RMV TISS MAXILL SINUS   5. Dysfunction of both eustachian tubes     6. Snoring     7. Facial pressure     8. Rhinogenic headache     9. Tired     10. 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  Ostiomeatal complex obstruction of paranasal sinus  CA NASAL/SINUS NDSC W/PARTIAL ETHMOIDECTOMY   13. Maxillary sinus polyp  UT NASAL SCOPY,RMV TISS MAXILL SINUS       The findings were explained and his questions were answered. Options were discussed including surgery to clear his airway. He agreed. Recommended surgical procedures are  Septoplasty  Partial submucous resection (SMR)  inferior turbinate right  Partial resection franck bullosa middle turbinate right  Anterior endoscopic ethmoidectomy right  Right max endoscopy and removal of tissue  Left maxillary antrostomy  Stereotactic computerized image guidance for sinus surgery  Surgical time estimate 3.5 hours     INFORMED CONSENT:   Using the CT scan, the planned procedures were explained in detail. The risks and benefits of these sinus procedures, including but not limited to risk of anesthesia, bleeding, infection, vision loss and /or eye muscle damage, CSF leak, epiphora (eye watering), potential need for further surgery and possible persistent sinus infections were discussed with the patient. The risks and benefits of alternative procedures, as well as the possible consequences of not undergoing the procedures were discussed, if applicable. They read and kept the information sheet with postop instructions, which lists the more common and even some of the more unusual complications, and the sheet listing the types of medications to avoid that could interfere with clotting. All of their questions were answered and they understood no guarantees were made. The patient verbalized understanding and gave consent to proceed. They also specifically consent to any additional related procedure, if the indications and need become evident during the surgery.      Septoplasty complications that may occur were discussed including postoperative bleeding (usually easy to control), infection, nasal crusting, a hole in the septum (perforation), failure to completely improve breathing, persistent septal deflection resulting

## 2021-04-22 LAB
ALBUMIN SERPL-MCNC: 5 G/DL (ref 3.2–5.3)
ALK PHOSPHATASE: 52 U/L (ref 39–130)
ALT SERPL-CCNC: 47 U/L (ref 0–40)
ANION GAP SERPL CALCULATED.3IONS-SCNC: 10 MMOL/L (ref 5–15)
AST SERPL-CCNC: 42 U/L (ref 0–41)
BILIRUB SERPL-MCNC: 0.4 MG/DL (ref 0.3–1.2)
BUN BLDV-MCNC: 14 MG/DL (ref 5–23)
CALCIUM SERPL-MCNC: 9.8 MG/DL (ref 8.5–10.5)
CHLORIDE BLD-SCNC: 101 MMOL/L (ref 98–109)
CHOLESTEROL/HDL RATIO: 2.6 (ref 1–5)
CHOLESTEROL: 153 MG/DL (ref 150–200)
CO2: 27 MMOL/L (ref 22–32)
CREAT SERPL-MCNC: 1.05 MG/DL (ref 0.6–1.3)
EGFR AFRICAN AMERICAN: >60 ML/MIN/1.73SQ.M
EGFR IF NONAFRICAN AMERICAN: >60 ML/MIN/1.73SQ.M
GLUCOSE: 100 MG/DL (ref 65–99)
HDLC SERPL-MCNC: 58 MG/DL
LDL CHOLESTEROL CALCULATED: 51 MG/DL
LDL/HDL RATIO: 0.9
POTASSIUM SERPL-SCNC: 4.1 MMOL/L (ref 3.5–5)
SODIUM BLD-SCNC: 138 MMOL/L (ref 134–146)
T4 FREE: 0.73 NG/DL (ref 0.61–1.6)
TOTAL PROTEIN: 8.1 G/DL (ref 6–8)
TRIGL SERPL-MCNC: 221 MG/DL (ref 27–150)
TSH SERPL DL<=0.05 MIU/L-ACNC: 1.44 UIU/ML (ref 0.49–4.67)
VLDLC SERPL CALC-MCNC: 44 MG/DL (ref 0–30)

## 2021-04-26 ENCOUNTER — OFFICE VISIT (OUTPATIENT)
Dept: CARDIOLOGY CLINIC | Age: 46
End: 2021-04-26
Payer: COMMERCIAL

## 2021-04-26 VITALS
BODY MASS INDEX: 27.8 KG/M2 | WEIGHT: 194.2 LBS | SYSTOLIC BLOOD PRESSURE: 130 MMHG | DIASTOLIC BLOOD PRESSURE: 88 MMHG | HEART RATE: 80 BPM | HEIGHT: 70 IN

## 2021-04-26 DIAGNOSIS — I25.119 CORONARY ARTERY DISEASE INVOLVING NATIVE CORONARY ARTERY OF NATIVE HEART WITH ANGINA PECTORIS (HCC): Primary | ICD-10-CM

## 2021-04-26 DIAGNOSIS — I10 ESSENTIAL HYPERTENSION: ICD-10-CM

## 2021-04-26 PROCEDURE — G8417 CALC BMI ABV UP PARAM F/U: HCPCS | Performed by: INTERNAL MEDICINE

## 2021-04-26 PROCEDURE — G8427 DOCREV CUR MEDS BY ELIG CLIN: HCPCS | Performed by: INTERNAL MEDICINE

## 2021-04-26 PROCEDURE — 99213 OFFICE O/P EST LOW 20 MIN: CPT | Performed by: INTERNAL MEDICINE

## 2021-04-26 PROCEDURE — 1036F TOBACCO NON-USER: CPT | Performed by: INTERNAL MEDICINE

## 2021-04-27 ENCOUNTER — TELEPHONE (OUTPATIENT)
Dept: CARDIOLOGY CLINIC | Age: 46
End: 2021-04-27

## 2021-04-27 NOTE — TELEPHONE ENCOUNTER
I see the cardiac clearance from Dr. Montserrat Bernstein scanned into the media dated 4/26. Dr. Fred Good wanted me to show this portion of his office note to Dr. Montserrat Bernstein and see if his note in the media is how he wants Dr. Fred Good to proceed or if he is ok with Dr. Jackie Paulino. From OV 4/16:    Medications he would need to be held completely for the surgery would be Brilinta, aspirin, both for a week before and several days after. Carvedilol has alpha-blocker activity and must not be taken for a day or 2 before surgery and for several days after surgery. Losartan needs to be held 2 days before and 2 days after surgery and would require coverage with Pepcid perioperatively and postoperatively to minimize swelling and avoid Vasoplegic syndrome      Please advise. Thank you!

## 2021-05-20 ENCOUNTER — OFFICE VISIT (OUTPATIENT)
Dept: FAMILY MEDICINE CLINIC | Age: 46
End: 2021-05-20
Payer: COMMERCIAL

## 2021-05-20 VITALS
WEIGHT: 195 LBS | BODY MASS INDEX: 27.98 KG/M2 | HEART RATE: 80 BPM | RESPIRATION RATE: 16 BRPM | SYSTOLIC BLOOD PRESSURE: 120 MMHG | TEMPERATURE: 98.2 F | OXYGEN SATURATION: 98 % | DIASTOLIC BLOOD PRESSURE: 84 MMHG

## 2021-05-20 DIAGNOSIS — J34.2 NASAL SEPTAL DEVIATION: ICD-10-CM

## 2021-05-20 DIAGNOSIS — I25.110 CORONARY ARTERY DISEASE INVOLVING NATIVE CORONARY ARTERY OF NATIVE HEART WITH UNSTABLE ANGINA PECTORIS (HCC): ICD-10-CM

## 2021-05-20 DIAGNOSIS — Z01.818 PREOP EXAMINATION: Primary | ICD-10-CM

## 2021-05-20 DIAGNOSIS — J34.1 RETENTION CYST OF PARANASAL SINUS: ICD-10-CM

## 2021-05-20 PROCEDURE — 99242 OFF/OP CONSLTJ NEW/EST SF 20: CPT | Performed by: FAMILY MEDICINE

## 2021-05-20 PROCEDURE — G8417 CALC BMI ABV UP PARAM F/U: HCPCS | Performed by: FAMILY MEDICINE

## 2021-05-20 PROCEDURE — G8427 DOCREV CUR MEDS BY ELIG CLIN: HCPCS | Performed by: FAMILY MEDICINE

## 2021-05-23 ASSESSMENT — ENCOUNTER SYMPTOMS
SORE THROAT: 0
COUGH: 0
RHINORRHEA: 0
CONSTIPATION: 0
ABDOMINAL DISTENTION: 0
SHORTNESS OF BREATH: 0
DIARRHEA: 0
NAUSEA: 0
EYE PAIN: 0
ABDOMINAL PAIN: 0
SINUS PRESSURE: 0

## 2021-05-23 NOTE — PROGRESS NOTES
[address] [address]  Dept: [phone]  Dept Fax: [phone]  Loc: [phone]  PROGRESS NOTE      VisitDate: 5/20/2021    Becca Do is a 40 y.o. male who presents today for:     Chief Complaint   Patient presents with    Pre-op Exam     sinus surgery 06/09/2021 Dr. Seema De Paz         Subjective:  HPI  Patient history of coronary disease presents for preop evaluation for upcoming ENT surgery. He has been cleared by his cardiologist for surgery. He has no history of reactions to anesthesia. No history of bleeding or clotting disorders. Review of Systems   Constitutional: Negative for appetite change and fever. HENT: Negative for congestion, ear pain, postnasal drip, rhinorrhea, sinus pressure and sore throat. Eyes: Negative for pain and visual disturbance. Respiratory: Negative for cough and shortness of breath. Cardiovascular: Negative for chest pain. Gastrointestinal: Negative for abdominal distention, abdominal pain, constipation, diarrhea and nausea. Genitourinary: Negative for dysuria, frequency and urgency. Musculoskeletal: Negative for arthralgias. Skin: Negative for rash. Neurological: Negative for dizziness.      Past Medical History:   Diagnosis Date    Hyperlipidemia     Hypertension       Past Surgical History:   Procedure Laterality Date    CAROTID STENT PLACEMENT  11/2019    x 3     Family History   Problem Relation Age of Onset    Heart Disease Mother      Social History     Tobacco Use    Smoking status: Never Smoker    Smokeless tobacco: Never Used   Substance Use Topics    Alcohol use: Yes     Comment: few drinks a few days      Current Outpatient Medications   Medication Sig Dispense Refill    carvedilol (COREG) 6.25 MG tablet Take 1 tablet by mouth 2 times daily (with meals) 180 tablet 3    pantoprazole (PROTONIX) 40 MG tablet take 1 tablet by mouth every morning before breakfast 90 tablet 2    ticagrelor (BRILINTA) 90 MG TABS tablet take 1 tablet by mouth twice a day 180 tablet 0    fluticasone (FLONASE) 50 MCG/ACT nasal spray 1 spray by Nasal route daily 1 Bottle 3    levothyroxine (SYNTHROID) 50 MCG tablet take 1 tablet by mouth once daily 90 tablet 1    rosuvastatin (CRESTOR) 20 MG tablet Take 1 tablet by mouth daily 90 tablet 3    losartan-hydroCHLOROthiazide (HYZAAR) 50-12.5 MG per tablet take 1 tablet by mouth once daily 90 tablet 3    Coenzyme Q10 (COQ10) 100 MG CAPS Take 100 mg by mouth daily 30 capsule 0    aspirin EC 81 MG EC tablet Take 1 tablet by mouth daily 90 tablet 1    nitroGLYCERIN (NITROSTAT) 0.4 MG SL tablet Place 1 tablet under the tongue every 5 minutes as needed for Chest pain up to max of 3 total doses. If no relief after 1 dose, call 911. (Patient not taking: Reported on 5/20/2021) 25 tablet 3     No current facility-administered medications for this visit. No Known Allergies  Health Maintenance   Topic Date Due    Hepatitis C screen  Never done    COVID-19 Vaccine (1) Never done    HIV screen  Never done    DTaP/Tdap/Td vaccine (1 - Tdap) Never done    Flu vaccine (Season Ended) 09/01/2021    Lipid screen  04/21/2022    Potassium monitoring  04/21/2022    Creatinine monitoring  04/21/2022    Hepatitis A vaccine  Aged Out    Hepatitis B vaccine  Aged Out    Hib vaccine  Aged Out    Meningococcal (ACWY) vaccine  Aged Out    Pneumococcal 0-64 years Vaccine  Aged Out         Objective:     Physical Exam  Constitutional:       General: He is not in acute distress. Appearance: He is well-developed. He is not diaphoretic. HENT:      Head: Normocephalic and atraumatic. Right Ear: External ear normal.      Left Ear: External ear normal.   Eyes:      Conjunctiva/sclera: Conjunctivae normal.   Neck:      Vascular: No JVD. Cardiovascular:      Rate and Rhythm: Normal rate and regular rhythm.       Heart sounds: Normal heart sounds. Pulmonary:      Effort: Pulmonary effort is normal.      Breath sounds: Normal breath sounds. No wheezing or rales. Musculoskeletal:         General: No tenderness. Skin:     General: Skin is warm and dry. Coloration: Skin is not pale. Neurological:      Mental Status: He is alert and oriented to person, place, and time. /84   Pulse 80   Temp 98.2 °F (36.8 °C) (Oral)   Resp 16   Wt 195 lb (88.5 kg)   SpO2 98%   BMI 27.98 kg/m²   EKG sinus rhythm, he has been cleared by his cardiologist  Labs unremarkable electrolytes normal, hemoglobin hematocrit stable    Impression/Plan:  1. Preop examination    2. Coronary artery disease involving native coronary artery of native heart with unstable angina pectoris ([facility] [address]    3. Retention cyst of paranasal sinus    4. Nasal septal deviation      Requested Prescriptions      No prescriptions requested or ordered in this encounter     No orders of the defined types were placed in this encounter. No contraindication to planned surgery. Patient is cleared for the OR    Patient giveneducational materials - see patient instructions. Discussed use, benefit, and side effects of prescribed medications. All patient questions answered. Pt voiced understanding. Reviewed health maintenance. Patient agreedwith treatment plan. Follow up as directed. **This report has been created using voice recognition software. It may contain minor errorswhich are inherent in voice recognition technology. **       Electronically signed by Jovita Garber MD on 5/23/2021 at 12:55 PM

## 2021-05-26 ENCOUNTER — TELEPHONE (OUTPATIENT)
Dept: ENT CLINIC | Age: 46
End: 2021-05-26

## 2021-05-26 NOTE — TELEPHONE ENCOUNTER
With regards to the cardiology clearance, I reviewed the report from Dr. Bobby Echols. He suggested there was moderate risk even with continuing the aspirin and he requested we continue the valsartan as well. His report stated \"continue the aspirin\" despite our statement on the request for clearance that he would be off the aspirin for 1 week before and at least a few days after surgery. Performing the nasal surgery while on aspirin would require nasal packing with a significant risk of postoperative hemorrhage and is not in the patient's best interest. That is painful ad causes instant sleep apnea, causing an order of magnitude more stress to the patient. The risk of Vasoplegic syndrome with angiotensin blood pressure medications is well recognized and is unacceptable for an elective procedure like this. Angiotensin drugs also contribute to swelling after any trauma, such as surgery, although it is worse with lisinopril than ARB's. A different class of blood pressure medication would be acceptable, such as amlodipine. .    I have placed calls both to Dr. Bobby Echols for clarification and the patient. His preop appt is tomorrow 8:15 AM and I have left a message on the patients home phone that that appt should be cancelled and/or rescheduled.

## 2021-05-28 ENCOUNTER — OFFICE VISIT (OUTPATIENT)
Dept: ENT CLINIC | Age: 46
End: 2021-05-28

## 2021-05-28 VITALS
BODY MASS INDEX: 28.63 KG/M2 | RESPIRATION RATE: 12 BRPM | HEART RATE: 72 BPM | TEMPERATURE: 97.6 F | HEIGHT: 70 IN | SYSTOLIC BLOOD PRESSURE: 108 MMHG | DIASTOLIC BLOOD PRESSURE: 64 MMHG | WEIGHT: 200 LBS

## 2021-05-28 DIAGNOSIS — J34.89 OSTIOMEATAL COMPLEX OBSTRUCTION OF PARANASAL SINUS: ICD-10-CM

## 2021-05-28 DIAGNOSIS — J34.1 MAXILLARY SINUS CYST: ICD-10-CM

## 2021-05-28 DIAGNOSIS — J34.3 HYPERTROPHY OF NASAL TURBINATES: ICD-10-CM

## 2021-05-28 DIAGNOSIS — Z95.1 HX OF CABG: ICD-10-CM

## 2021-05-28 DIAGNOSIS — J34.2 DEVIATED NASAL SEPTUM: Primary | ICD-10-CM

## 2021-05-28 DIAGNOSIS — J32.0 CHRONIC MAXILLARY SINUSITIS: ICD-10-CM

## 2021-05-28 DIAGNOSIS — R51.9 RHINOGENIC HEADACHE: ICD-10-CM

## 2021-05-28 DIAGNOSIS — R44.8 FACIAL PRESSURE: ICD-10-CM

## 2021-05-28 DIAGNOSIS — J32.2 CHRONIC ETHMOIDAL SINUSITIS: ICD-10-CM

## 2021-05-28 PROCEDURE — 99999 PR OFFICE/OUTPT VISIT,PROCEDURE ONLY: CPT | Performed by: OTOLARYNGOLOGY

## 2021-05-28 ASSESSMENT — ENCOUNTER SYMPTOMS
TROUBLE SWALLOWING: 0
FACIAL SWELLING: 0
RHINORRHEA: 0
STRIDOR: 0
DIARRHEA: 0
VOMITING: 0
SORE THROAT: 0
VOICE CHANGE: 0
COUGH: 0
CHEST TIGHTNESS: 0
COLOR CHANGE: 0
ABDOMINAL PAIN: 0
WHEEZING: 0
NAUSEA: 0
SINUS PRESSURE: 0
SHORTNESS OF BREATH: 0
APNEA: 0
CHOKING: 0
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
## 2021-05-28 NOTE — TELEPHONE ENCOUNTER
Given the risks delineated by Dr. Tiffanie Hernandez will need to follow his instructions and take associated risks while being off ASA and Valsartan. Given that he needs the surgery, I believe it is reasonable to proceed.

## 2021-05-28 NOTE — TELEPHONE ENCOUNTER
Discussed with Dr. Danish Rogers and he is ok with proceeding. I spoke with patient who is also ready to proceed. Scheduled him today for pre op.

## 2021-06-03 RX ORDER — UBIDECARENONE 75 MG
CAPSULE ORAL DAILY
COMMUNITY

## 2021-06-03 NOTE — PROGRESS NOTES
States he had covid test done at [facility] in [facility] 6/2/21. States he will try to send results to the office through [employer]. States he will get his CBC done today at [facility] screening questionnaire complete and negative for symptoms or exposure see chart for documentation.   Please bring vaccine card if you have had both covid shots  Please limit your exposure to the public after you have your covid test  Please call your doctor immediately if you develop any symptoms of covid prior to your surgery

## 2021-06-03 NOTE — PROGRESS NOTES
In preparation for their surgical procedure above patient was screened for Obstructive Sleep Apnea (KRISTEN) using the STOP-Bang Questionnaire by the Pre-Admission Testing department. This is a pre-surgical screening tool for patient safety and serves as a recommendation, this WILL NOT cause cancellation of surgery. STOP-Bang Questionnaire  * Do you currently see a pulmonologist?  No     If yes STOP, do not complete. Patient follows with Dr.     1.  Do you snore loudly (able to be heard in the next room)? No    2. Do you often feel tired or sleepy during the daytime? No       3. Has anyone ever told you that you stop breathing during your sleep? No    4. Do you have or are you being treated for high blood pressure? Yes      5. BMI more than 35? BMI (Calculated): 27.3        No    6. Age over 48 years? 40 y.o. No    7. Neck Circumference greater than 17 inches for male or 16 inches for female? Measured           (visits only)            Not Applicable    8. Gender Male? Yes      TOTAL SCORE: 2    KRISTEN - Low Risk : Yes to 0 - 2 questions  KRISTEN - Intermediate Risk : Yes to 3 - 4 questions  KRISTEN - High Risk : Yes to 5 - 8 questions    Adapted from:   STOP Questionnaire: A Tool to Screen Patients for Obstructive Sleep Apnea   [employer]T[employer] Valle.C[employer]P.C., ELISSA Jean.B.B.S., Carlotta Price M.D., [place]. Christy Prakash, Ph.D., ELISSA Flynn.B.B.S., Eugene Rajput M.Sc., Akiko Portillo M.D., [facility]. [employer]    Anesthesiology 2008; 424:[id number] Copyright 2008, the [address] of [employer]esthesi[employer], [facility], [place] [facility].   ----------------------------------------------------------------------------------------------------------------

## 2021-06-04 LAB
ABSOLUTE BASO #: 0 X10E9/L (ref 0–0.2)
ABSOLUTE EOS #: 0 X10E9/L (ref 0–0.4)
ABSOLUTE LYMPH #: 0.6 X10E9/L (ref 1–3.5)
ABSOLUTE MONO #: 0.6 X10E9/L (ref 0–0.9)
ABSOLUTE NEUT #: 3.6 X10E9/L (ref 1.5–6.6)
ALBUMIN SERPL-MCNC: 4.6 G/DL (ref 3.2–5.3)
ALK PHOSPHATASE: 51 U/L (ref 39–130)
ALT SERPL-CCNC: 43 U/L (ref 0–40)
ANION GAP SERPL CALCULATED.3IONS-SCNC: 10 MMOL/L (ref 5–15)
AST SERPL-CCNC: 43 U/L (ref 0–41)
BASOPHILS RELATIVE PERCENT: 0.7 %
BILIRUB SERPL-MCNC: 0.5 MG/DL (ref 0.3–1.2)
BUN BLDV-MCNC: 8 MG/DL (ref 5–23)
CALCIUM SERPL-MCNC: 10 MG/DL (ref 8.5–10.5)
CHLORIDE BLD-SCNC: 102 MMOL/L (ref 98–109)
CO2: 28 MMOL/L (ref 22–32)
CREAT SERPL-MCNC: 0.99 MG/DL (ref 0.6–1.3)
EGFR AFRICAN AMERICAN: >60 ML/MIN/1.73SQ.M
EGFR IF NONAFRICAN AMERICAN: >60 ML/MIN/1.73SQ.M
EOSINOPHILS RELATIVE PERCENT: 0.9 %
GLUCOSE: 106 MG/DL (ref 65–99)
HCT VFR BLD CALC: 39.1 % (ref 39–49)
HEMOGLOBIN: 13.8 G/DL (ref 13–17)
LYMPHOCYTE %: 12.5 %
MCH RBC QN AUTO: 32.6 PG (ref 27–34)
MCHC RBC AUTO-ENTMCNC: 35.3 G/DL (ref 32–36)
MCV RBC AUTO: 92 FL (ref 80–100)
MONOCYTES # BLD: 11.9 %
NEUTROPHILS RELATIVE PERCENT: 74 %
PDW BLD-RTO: 12.6 % (ref 11.5–15)
PLATELETS: 213 X10E9/L (ref 150–450)
PMV BLD AUTO: 7.8 FL (ref 7–12)
POTASSIUM SERPL-SCNC: 4.2 MMOL/L (ref 3.5–5)
RBC: 4.23 X10E12/L (ref 4.1–5.7)
SODIUM BLD-SCNC: 140 MMOL/L (ref 134–146)
TOTAL PROTEIN: 7.9 G/DL (ref 6–8)
WBC: 4.9 X10E9/L (ref 4–11)

## 2021-06-07 NOTE — PROGRESS NOTES
Spoke with Rosie at Dr Dinesh Mae office, she will work on getting patient's COVID test results for OR 6/9 scanned into media.

## 2021-06-08 ENCOUNTER — HOSPITAL ENCOUNTER (OUTPATIENT)
Age: 46
Discharge: HOME | End: 2021-06-08
Payer: COMMERCIAL

## 2021-06-08 PROBLEM — R51.9 RHINOGENIC HEADACHE: Status: ACTIVE | Noted: 2021-06-08

## 2021-06-08 PROBLEM — J34.89 OSTIOMEATAL COMPLEX OBSTRUCTION OF PARANASAL SINUS: Status: ACTIVE | Noted: 2021-06-08

## 2021-06-08 PROBLEM — J32.0 CHRONIC MAXILLARY SINUSITIS: Status: ACTIVE | Noted: 2021-06-08

## 2021-06-08 PROBLEM — J34.1 MAXILLARY SINUS CYST: Status: ACTIVE | Noted: 2021-06-08

## 2021-06-08 PROBLEM — J32.2 CHRONIC ETHMOIDAL SINUSITIS: Status: ACTIVE | Noted: 2021-06-08

## 2021-06-08 PROBLEM — J34.2 DEVIATED NASAL SEPTUM: Status: ACTIVE | Noted: 2021-06-08

## 2021-06-08 PROBLEM — J34.3 HYPERTROPHY OF NASAL TURBINATES: Status: ACTIVE | Noted: 2021-06-08

## 2021-06-08 LAB
INFLUENZA A: NOT DETECTED
INFLUENZA B: NOT DETECTED
SARS-COV-2 RNA, RT PCR: NOT DETECTED

## 2021-06-08 PROCEDURE — 87636 SARSCOV2 & INF A&B AMP PRB: CPT

## 2021-06-08 ASSESSMENT — ENCOUNTER SYMPTOMS
COLOR CHANGE: 0
DIARRHEA: 0
NAUSEA: 0
FACIAL SWELLING: 0
COUGH: 0
APNEA: 0
TROUBLE SWALLOWING: 0
CHEST TIGHTNESS: 0
VOICE CHANGE: 0
VOMITING: 0
STRIDOR: 0
ABDOMINAL PAIN: 0
SHORTNESS OF BREATH: 0
SORE THROAT: 0
SINUS PRESSURE: 0
RHINORRHEA: 0
CHOKING: 0
WHEEZING: 0

## 2021-06-09 ENCOUNTER — ANESTHESIA EVENT (OUTPATIENT)
Dept: OPERATING ROOM | Age: 46
End: 2021-06-09
Payer: COMMERCIAL

## 2021-06-09 ENCOUNTER — HOSPITAL ENCOUNTER (OUTPATIENT)
Age: 46
Setting detail: OUTPATIENT SURGERY
Discharge: HOME | End: 2021-06-09
Attending: OTOLARYNGOLOGY | Admitting: OTOLARYNGOLOGY
Payer: COMMERCIAL

## 2021-06-09 ENCOUNTER — ANESTHESIA (OUTPATIENT)
Dept: OPERATING ROOM | Age: 46
End: 2021-06-09
Payer: COMMERCIAL

## 2021-06-09 VITALS
SYSTOLIC BLOOD PRESSURE: 148 MMHG | HEART RATE: 87 BPM | WEIGHT: 201 LBS | TEMPERATURE: 96.7 F | HEIGHT: 70 IN | OXYGEN SATURATION: 97 % | RESPIRATION RATE: 18 BRPM | BODY MASS INDEX: 28.77 KG/M2 | DIASTOLIC BLOOD PRESSURE: 94 MMHG

## 2021-06-09 VITALS
RESPIRATION RATE: 2 BRPM | OXYGEN SATURATION: 99 % | TEMPERATURE: 68.7 F | SYSTOLIC BLOOD PRESSURE: 106 MMHG | DIASTOLIC BLOOD PRESSURE: 60 MMHG

## 2021-06-09 DIAGNOSIS — J34.89 OSTIOMEATAL COMPLEX OBSTRUCTION OF PARANASAL SINUS: ICD-10-CM

## 2021-06-09 DIAGNOSIS — R51.9 RHINOGENIC HEADACHE: ICD-10-CM

## 2021-06-09 DIAGNOSIS — J34.1 MAXILLARY SINUS CYST: ICD-10-CM

## 2021-06-09 DIAGNOSIS — J32.0 CHRONIC MAXILLARY SINUSITIS: ICD-10-CM

## 2021-06-09 DIAGNOSIS — J34.3 HYPERTROPHY OF NASAL TURBINATES: ICD-10-CM

## 2021-06-09 DIAGNOSIS — J32.2 CHRONIC ETHMOIDAL SINUSITIS: ICD-10-CM

## 2021-06-09 DIAGNOSIS — J34.2 DEVIATED NASAL SEPTUM: Primary | ICD-10-CM

## 2021-06-09 LAB — POTASSIUM SERPL-SCNC: 3.9 MEQ/L (ref 3.5–5.2)

## 2021-06-09 PROCEDURE — 87147 CULTURE TYPE IMMUNOLOGIC: CPT

## 2021-06-09 PROCEDURE — 6360000002 HC RX W HCPCS: Performed by: REGISTERED NURSE

## 2021-06-09 PROCEDURE — 7100000010 HC PHASE II RECOVERY - FIRST 15 MIN: Performed by: OTOLARYNGOLOGY

## 2021-06-09 PROCEDURE — 87070 CULTURE OTHR SPECIMN AEROBIC: CPT

## 2021-06-09 PROCEDURE — 6360000002 HC RX W HCPCS: Performed by: OTOLARYNGOLOGY

## 2021-06-09 PROCEDURE — 2500000003 HC RX 250 WO HCPCS: Performed by: OTOLARYNGOLOGY

## 2021-06-09 PROCEDURE — 7100000011 HC PHASE II RECOVERY - ADDTL 15 MIN: Performed by: OTOLARYNGOLOGY

## 2021-06-09 PROCEDURE — 31267 ENDOSCOPY MAXILLARY SINUS: CPT | Performed by: OTOLARYNGOLOGY

## 2021-06-09 PROCEDURE — 3700000000 HC ANESTHESIA ATTENDED CARE: Performed by: OTOLARYNGOLOGY

## 2021-06-09 PROCEDURE — 2580000003 HC RX 258: Performed by: OTOLARYNGOLOGY

## 2021-06-09 PROCEDURE — 36415 COLL VENOUS BLD VENIPUNCTURE: CPT

## 2021-06-09 PROCEDURE — 2500000003 HC RX 250 WO HCPCS: Performed by: NURSE ANESTHETIST, CERTIFIED REGISTERED

## 2021-06-09 PROCEDURE — 7100000001 HC PACU RECOVERY - ADDTL 15 MIN: Performed by: OTOLARYNGOLOGY

## 2021-06-09 PROCEDURE — 30140 RESECT INFERIOR TURBINATE: CPT | Performed by: OTOLARYNGOLOGY

## 2021-06-09 PROCEDURE — 2720000010 HC SURG SUPPLY STERILE: Performed by: OTOLARYNGOLOGY

## 2021-06-09 PROCEDURE — 87075 CULTR BACTERIA EXCEPT BLOOD: CPT

## 2021-06-09 PROCEDURE — 87205 SMEAR GRAM STAIN: CPT

## 2021-06-09 PROCEDURE — 3600000004 HC SURGERY LEVEL 4 BASE: Performed by: OTOLARYNGOLOGY

## 2021-06-09 PROCEDURE — 6360000002 HC RX W HCPCS: Performed by: NURSE ANESTHETIST, CERTIFIED REGISTERED

## 2021-06-09 PROCEDURE — 3700000001 HC ADD 15 MINUTES (ANESTHESIA): Performed by: OTOLARYNGOLOGY

## 2021-06-09 PROCEDURE — 61782 SCAN PROC CRANIAL EXTRA: CPT | Performed by: OTOLARYNGOLOGY

## 2021-06-09 PROCEDURE — 30520 REPAIR OF NASAL SEPTUM: CPT | Performed by: OTOLARYNGOLOGY

## 2021-06-09 PROCEDURE — 6370000000 HC RX 637 (ALT 250 FOR IP): Performed by: OTOLARYNGOLOGY

## 2021-06-09 PROCEDURE — 87186 SC STD MICRODIL/AGAR DIL: CPT

## 2021-06-09 PROCEDURE — 87077 CULTURE AEROBIC IDENTIFY: CPT

## 2021-06-09 PROCEDURE — 84132 ASSAY OF SERUM POTASSIUM: CPT

## 2021-06-09 PROCEDURE — 31240 NSL/SNS NDSC CNCH BULL RESCJ: CPT | Performed by: OTOLARYNGOLOGY

## 2021-06-09 PROCEDURE — 6370000000 HC RX 637 (ALT 250 FOR IP): Performed by: NURSE ANESTHETIST, CERTIFIED REGISTERED

## 2021-06-09 PROCEDURE — 7100000000 HC PACU RECOVERY - FIRST 15 MIN: Performed by: OTOLARYNGOLOGY

## 2021-06-09 PROCEDURE — 2709999900 HC NON-CHARGEABLE SUPPLY: Performed by: OTOLARYNGOLOGY

## 2021-06-09 PROCEDURE — 3600000014 HC SURGERY LEVEL 4 ADDTL 15MIN: Performed by: OTOLARYNGOLOGY

## 2021-06-09 PROCEDURE — 2500000003 HC RX 250 WO HCPCS: Performed by: REGISTERED NURSE

## 2021-06-09 PROCEDURE — 31254 NSL/SINS NDSC W/PRTL ETHMDCT: CPT | Performed by: OTOLARYNGOLOGY

## 2021-06-09 RX ORDER — DEXAMETHASONE SODIUM PHOSPHATE 4 MG/ML
12 INJECTION, SOLUTION INTRA-ARTICULAR; INTRALESIONAL; INTRAMUSCULAR; INTRAVENOUS; SOFT TISSUE
Status: DISCONTINUED | OUTPATIENT
Start: 2021-06-09 | End: 2021-06-09 | Stop reason: HOSPADM

## 2021-06-09 RX ORDER — GINSENG 100 MG
CAPSULE ORAL PRN
Status: DISCONTINUED | OUTPATIENT
Start: 2021-06-09 | End: 2021-06-09 | Stop reason: ALTCHOICE

## 2021-06-09 RX ORDER — HYDROCODONE BITARTRATE AND ACETAMINOPHEN 7.5; 325 MG/1; MG/1
1 TABLET ORAL EVERY 6 HOURS PRN
Qty: 20 TABLET | Refills: 0 | Status: SHIPPED | OUTPATIENT
Start: 2021-06-09 | End: 2021-06-21 | Stop reason: SDUPTHER

## 2021-06-09 RX ORDER — CLINDAMYCIN PHOSPHATE 900 MG/50ML
900 INJECTION INTRAVENOUS ONCE
Status: COMPLETED | OUTPATIENT
Start: 2021-06-09 | End: 2021-06-09

## 2021-06-09 RX ORDER — SULFAMETHOXAZOLE AND TRIMETHOPRIM 800; 160 MG/1; MG/1
1 TABLET ORAL 2 TIMES DAILY
Qty: 28 TABLET | Refills: 2 | Status: SHIPPED | OUTPATIENT
Start: 2021-06-09 | End: 2021-06-23

## 2021-06-09 RX ORDER — MINERAL OIL AND WHITE PETROLATUM 150; 830 MG/G; MG/G
OINTMENT OPHTHALMIC PRN
Status: DISCONTINUED | OUTPATIENT
Start: 2021-06-09 | End: 2021-06-09 | Stop reason: SDUPTHER

## 2021-06-09 RX ORDER — DEXAMETHASONE SODIUM PHOSPHATE 4 MG/ML
INJECTION, SOLUTION INTRA-ARTICULAR; INTRALESIONAL; INTRAMUSCULAR; INTRAVENOUS; SOFT TISSUE PRN
Status: DISCONTINUED | OUTPATIENT
Start: 2021-06-09 | End: 2021-06-09 | Stop reason: ALTCHOICE

## 2021-06-09 RX ORDER — FENTANYL CITRATE 50 UG/ML
INJECTION, SOLUTION INTRAMUSCULAR; INTRAVENOUS PRN
Status: DISCONTINUED | OUTPATIENT
Start: 2021-06-09 | End: 2021-06-09 | Stop reason: SDUPTHER

## 2021-06-09 RX ORDER — LIDOCAINE HYDROCHLORIDE AND EPINEPHRINE 10; 10 MG/ML; UG/ML
INJECTION, SOLUTION INFILTRATION; PERINEURAL PRN
Status: DISCONTINUED | OUTPATIENT
Start: 2021-06-09 | End: 2021-06-09 | Stop reason: ALTCHOICE

## 2021-06-09 RX ORDER — METOPROLOL SUCCINATE 25 MG/1
25 TABLET, EXTENDED RELEASE ORAL ONCE
Status: COMPLETED | OUTPATIENT
Start: 2021-06-09 | End: 2021-06-09

## 2021-06-09 RX ORDER — ROCURONIUM BROMIDE 10 MG/ML
INJECTION, SOLUTION INTRAVENOUS PRN
Status: DISCONTINUED | OUTPATIENT
Start: 2021-06-09 | End: 2021-06-09 | Stop reason: SDUPTHER

## 2021-06-09 RX ORDER — ONDANSETRON 2 MG/ML
INJECTION INTRAMUSCULAR; INTRAVENOUS PRN
Status: DISCONTINUED | OUTPATIENT
Start: 2021-06-09 | End: 2021-06-09 | Stop reason: SDUPTHER

## 2021-06-09 RX ORDER — FAMOTIDINE 20 MG/1
20 TABLET, FILM COATED ORAL 2 TIMES DAILY
Qty: 60 TABLET | Refills: 0 | Status: SHIPPED | OUTPATIENT
Start: 2021-06-09 | End: 2022-02-10 | Stop reason: ALTCHOICE

## 2021-06-09 RX ORDER — MIDAZOLAM HYDROCHLORIDE 1 MG/ML
INJECTION INTRAMUSCULAR; INTRAVENOUS PRN
Status: DISCONTINUED | OUTPATIENT
Start: 2021-06-09 | End: 2021-06-09

## 2021-06-09 RX ORDER — OXYMETAZOLINE HYDROCHLORIDE 0.05 G/100ML
SPRAY NASAL PRN
Status: DISCONTINUED | OUTPATIENT
Start: 2021-06-09 | End: 2021-06-09 | Stop reason: ALTCHOICE

## 2021-06-09 RX ORDER — HYDROCODONE BITARTRATE AND ACETAMINOPHEN 7.5; 325 MG/1; MG/1
1 TABLET ORAL ONCE
Status: COMPLETED | OUTPATIENT
Start: 2021-06-09 | End: 2021-06-09

## 2021-06-09 RX ORDER — SODIUM CHLORIDE 9 MG/ML
INJECTION, SOLUTION INTRAVENOUS CONTINUOUS
Status: DISCONTINUED | OUTPATIENT
Start: 2021-06-09 | End: 2021-06-09 | Stop reason: HOSPADM

## 2021-06-09 RX ORDER — PROPOFOL 10 MG/ML
INJECTION, EMULSION INTRAVENOUS PRN
Status: DISCONTINUED | OUTPATIENT
Start: 2021-06-09 | End: 2021-06-09 | Stop reason: SDUPTHER

## 2021-06-09 RX ORDER — PREDNISONE 20 MG/1
20 TABLET ORAL DAILY
Qty: 4 TABLET | Refills: 0 | Status: SHIPPED | OUTPATIENT
Start: 2021-06-09 | End: 2021-06-12

## 2021-06-09 RX ORDER — LIDOCAINE HCL/PF 100 MG/5ML
SYRINGE (ML) INJECTION PRN
Status: DISCONTINUED | OUTPATIENT
Start: 2021-06-09 | End: 2021-06-09 | Stop reason: SDUPTHER

## 2021-06-09 RX ORDER — ROPIVACAINE HYDROCHLORIDE 5 MG/ML
INJECTION, SOLUTION EPIDURAL; INFILTRATION; PERINEURAL PRN
Status: DISCONTINUED | OUTPATIENT
Start: 2021-06-09 | End: 2021-06-09 | Stop reason: ALTCHOICE

## 2021-06-09 RX ADMIN — ROCURONIUM BROMIDE 10 MG: 10 INJECTION INTRAVENOUS at 17:15

## 2021-06-09 RX ADMIN — ROCURONIUM BROMIDE 10 MG: 10 INJECTION INTRAVENOUS at 15:56

## 2021-06-09 RX ADMIN — ONDANSETRON HYDROCHLORIDE 4 MG: 4 INJECTION, SOLUTION INTRAMUSCULAR; INTRAVENOUS at 18:03

## 2021-06-09 RX ADMIN — PROPOFOL 60 MG: 10 INJECTION, EMULSION INTRAVENOUS at 15:16

## 2021-06-09 RX ADMIN — DEXAMETHASONE SODIUM PHOSPHATE 12 MG: 4 INJECTION, SOLUTION INTRA-ARTICULAR; INTRALESIONAL; INTRAMUSCULAR; INTRAVENOUS; SOFT TISSUE at 14:40

## 2021-06-09 RX ADMIN — SODIUM CHLORIDE: 9 INJECTION, SOLUTION INTRAVENOUS at 15:06

## 2021-06-09 RX ADMIN — SUGAMMADEX 200 MG: 100 INJECTION, SOLUTION INTRAVENOUS at 18:05

## 2021-06-09 RX ADMIN — FENTANYL CITRATE 50 MCG: 50 INJECTION, SOLUTION INTRAMUSCULAR; INTRAVENOUS at 16:27

## 2021-06-09 RX ADMIN — Medication 80 MG: at 15:14

## 2021-06-09 RX ADMIN — MINERAL OIL AND WHITE PETROLATUM 1 APPLICATOR: 150; 830 OINTMENT OPHTHALMIC at 15:17

## 2021-06-09 RX ADMIN — FENTANYL CITRATE 50 MCG: 50 INJECTION, SOLUTION INTRAMUSCULAR; INTRAVENOUS at 18:10

## 2021-06-09 RX ADMIN — ROCURONIUM BROMIDE 50 MG: 10 INJECTION INTRAVENOUS at 15:14

## 2021-06-09 RX ADMIN — METOPROLOL SUCCINATE 25 MG: 25 TABLET, FILM COATED, EXTENDED RELEASE ORAL at 11:22

## 2021-06-09 RX ADMIN — HYDROCODONE BITARTRATE AND ACETAMINOPHEN 1 TABLET: 7.5; 325 TABLET ORAL at 19:11

## 2021-06-09 RX ADMIN — CLINDAMYCIN PHOSPHATE 900 MG: 900 INJECTION, SOLUTION INTRAVENOUS at 15:20

## 2021-06-09 RX ADMIN — FAMOTIDINE 20 MG: 10 INJECTION, SOLUTION INTRAVENOUS at 11:13

## 2021-06-09 RX ADMIN — FENTANYL CITRATE 100 MCG: 50 INJECTION, SOLUTION INTRAMUSCULAR; INTRAVENOUS at 15:14

## 2021-06-09 RX ADMIN — PROPOFOL 200 MG: 10 INJECTION, EMULSION INTRAVENOUS at 15:14

## 2021-06-09 ASSESSMENT — PULMONARY FUNCTION TESTS
PIF_VALUE: 1
PIF_VALUE: 20
PIF_VALUE: 21
PIF_VALUE: 20
PIF_VALUE: 20
PIF_VALUE: 21
PIF_VALUE: 21
PIF_VALUE: 20
PIF_VALUE: 21
PIF_VALUE: 20
PIF_VALUE: 16
PIF_VALUE: 20
PIF_VALUE: 3
PIF_VALUE: 20
PIF_VALUE: 21
PIF_VALUE: 21
PIF_VALUE: 20
PIF_VALUE: 20
PIF_VALUE: 21
PIF_VALUE: 21
PIF_VALUE: 20
PIF_VALUE: 21
PIF_VALUE: 20
PIF_VALUE: 21
PIF_VALUE: 20
PIF_VALUE: 20
PIF_VALUE: 15
PIF_VALUE: 20
PIF_VALUE: 20
PIF_VALUE: 21
PIF_VALUE: 20
PIF_VALUE: 20
PIF_VALUE: 30
PIF_VALUE: 20
PIF_VALUE: 21
PIF_VALUE: 20
PIF_VALUE: 16
PIF_VALUE: 1
PIF_VALUE: 20
PIF_VALUE: 20
PIF_VALUE: 21
PIF_VALUE: 21
PIF_VALUE: 20
PIF_VALUE: 21
PIF_VALUE: 16
PIF_VALUE: 20
PIF_VALUE: 16
PIF_VALUE: 20
PIF_VALUE: 20
PIF_VALUE: 48
PIF_VALUE: 20
PIF_VALUE: 20
PIF_VALUE: 2
PIF_VALUE: 20
PIF_VALUE: 20
PIF_VALUE: 16
PIF_VALUE: 16
PIF_VALUE: 21
PIF_VALUE: 20
PIF_VALUE: 5
PIF_VALUE: 20
PIF_VALUE: 20
PIF_VALUE: 45
PIF_VALUE: 21
PIF_VALUE: 20
PIF_VALUE: 21
PIF_VALUE: 20
PIF_VALUE: 7
PIF_VALUE: 20
PIF_VALUE: 20
PIF_VALUE: 18
PIF_VALUE: 20
PIF_VALUE: 0
PIF_VALUE: 20
PIF_VALUE: 20
PIF_VALUE: 21
PIF_VALUE: 20
PIF_VALUE: 20
PIF_VALUE: 1
PIF_VALUE: 20
PIF_VALUE: 21
PIF_VALUE: 20
PIF_VALUE: 19
PIF_VALUE: 20
PIF_VALUE: 20
PIF_VALUE: 21
PIF_VALUE: 20
PIF_VALUE: 25
PIF_VALUE: 16
PIF_VALUE: 20
PIF_VALUE: 20
PIF_VALUE: 14
PIF_VALUE: 15
PIF_VALUE: 20
PIF_VALUE: 20
PIF_VALUE: 17
PIF_VALUE: 20
PIF_VALUE: 21
PIF_VALUE: 16
PIF_VALUE: 20
PIF_VALUE: 15
PIF_VALUE: 20
PIF_VALUE: 4
PIF_VALUE: 21
PIF_VALUE: 21
PIF_VALUE: 20
PIF_VALUE: 1
PIF_VALUE: 20
PIF_VALUE: 20
PIF_VALUE: 21
PIF_VALUE: 20
PIF_VALUE: 20
PIF_VALUE: 21
PIF_VALUE: 20
PIF_VALUE: 3
PIF_VALUE: 20
PIF_VALUE: 20
PIF_VALUE: 21
PIF_VALUE: 20
PIF_VALUE: 16
PIF_VALUE: 20
PIF_VALUE: 16
PIF_VALUE: 20
PIF_VALUE: 20
PIF_VALUE: 21
PIF_VALUE: 20
PIF_VALUE: 16
PIF_VALUE: 20
PIF_VALUE: 21
PIF_VALUE: 20
PIF_VALUE: 20
PIF_VALUE: 21
PIF_VALUE: 20
PIF_VALUE: 21
PIF_VALUE: 20
PIF_VALUE: 21
PIF_VALUE: 1
PIF_VALUE: 1
PIF_VALUE: 20
PIF_VALUE: 20
PIF_VALUE: 3
PIF_VALUE: 0
PIF_VALUE: 20

## 2021-06-09 ASSESSMENT — PAIN SCALES - GENERAL
PAINLEVEL_OUTOF10: 4
PAINLEVEL_OUTOF10: 0
PAINLEVEL_OUTOF10: 3
PAINLEVEL_OUTOF10: 0
PAINLEVEL_OUTOF10: 0
PAINLEVEL_OUTOF10: 4
PAINLEVEL_OUTOF10: 4

## 2021-06-09 ASSESSMENT — PAIN - FUNCTIONAL ASSESSMENT: PAIN_FUNCTIONAL_ASSESSMENT: 0-10

## 2021-06-09 NOTE — BRIEF OP NOTE
Brief Postoperative Note      Patient: Ankuhs Lugo  YOB: 1975  MRN: [id number]    Date of Procedure: 6/9/2021    Pre-Op Diagnosis: CHRONIC ETHMOID SINUSITIS, DEVIATED NASAL SEPTUM, HYPERTROPHY OF RIGHT INFERIOR NASAL TURBINATE, BARRINGTON BULLOSA RIGHT MIDDLE TURBINATE, MAXILLARY SINUS CYST, RIGHT MAXILLARY SINUS    Post-Op Diagnosis: Same       Procedure(s):  SEPTOPLASTY  SUBMUCOUS RESECT TURBINATES PARTIAL RIGHT INFERIOR TURBINATE  PARTIAL RESECTION BARRINGTON BULLOSA RIGHT MIDDLE TURBINATE  PARTIAL ANTERIOR ETHMOIDECTOMY   MAXILLARY ANTROSTOMY WITH REMOVAL OF TISSUE FROM MAXILLARY SINUS RIGHT  MAXILLARY ANTROSTOMY LEFT  stereotactic computerized image guidance for sinus surgery bilateral    Surgeon(s):  Joel Walton MD    Assistant:  * No surgical staff found *    Anesthesia: General    Estimated Blood Loss (mL): less than 992     Complications: None    Specimens:   ID Type Source Tests Collected by Time Destination   1 : Left Nasal Facet Swab Swab Sinus CULTURE, ANAEROBIC AND AEROBIC Joel Walton MD 6/9/2021 1550    A :  Tissue Nose SURGICAL PATHOLOGY Joel Walton MD 6/9/2021 1707        Implants:  * No implants in log *      Drains: * No LDAs found *    Findings: Badly traumatized/fractured nasal septum severe deviation to the left and huge right inferior turbinate with barrington bullosa right middle turbinate. Other findings as per diagnoses.   No complications and no packing    Electronically signed by Luzma Junior MD on 6/9/2021 at 6:24 PM
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
## 2021-06-09 NOTE — PROGRESS NOTES
Oriented to sds     11    Refuses flu and pneumonia vaccine. Family updated to stay in room. Informed family to take belonging with them if they leave. Keep nothing of value in room unattended. Medication given back to family. Family is taking them with them. Ipt was asked and agreed to first name and last initial being put on white boards. Allergy and fall risks applied. SCD Applied to patient. Warming blanket applied to patient. Pt denies any abuse or thoughts of suicide.

## 2021-06-09 NOTE — INTERVAL H&P NOTE
Pt Name: Stephanie Collins  MRN: [id number]  YOB: 1975  Date of evaluation: 6/9/2021    I have examined the patient and reviewed the H&P/Consult and there are no changes to the patient or plans.          Electronically signed by Marco Lowry MD on 6/9/2021 at 3:02 PM

## 2021-06-09 NOTE — ANESTHESIA POSTPROCEDURE EVALUATION
Cardiovascular:  Stable    Hydration:  Adequate    PONV:  Stable    Post-op Pain:  Adequate analgesia    Post-op Assessment:  No apparent anesthetic complications    Additional Follow-Up / Treatment / Comment:  None    Jaylin Vega.  [address]  June 9, 2021   7:09 PM

## 2021-06-09 NOTE — PROGRESS NOTES
Pt returned to [facility] room 11. Vitals and assessment as charted. 0.9 infusing, @950ml to count from PACU. Pt has sherbert and water. Pt verbalized understanding of discharge criteria and call light use. Call light in reach.

## 2021-06-10 ENCOUNTER — OFFICE VISIT (OUTPATIENT)
Dept: ENT CLINIC | Age: 46
End: 2021-06-10

## 2021-06-10 VITALS
BODY MASS INDEX: 29.39 KG/M2 | HEIGHT: 70 IN | HEART RATE: 80 BPM | DIASTOLIC BLOOD PRESSURE: 70 MMHG | SYSTOLIC BLOOD PRESSURE: 122 MMHG | WEIGHT: 205.3 LBS | RESPIRATION RATE: 14 BRPM | TEMPERATURE: 97.6 F

## 2021-06-10 DIAGNOSIS — J34.3 HYPERTROPHY OF NASAL TURBINATES: ICD-10-CM

## 2021-06-10 DIAGNOSIS — Z98.890 STATUS POST FUNCTIONAL ENDOSCOPIC SINUS SURGERY (FESS): ICD-10-CM

## 2021-06-10 DIAGNOSIS — J34.2 DEVIATED NASAL SEPTUM: Primary | ICD-10-CM

## 2021-06-10 DIAGNOSIS — J32.2 CHRONIC ETHMOIDAL SINUSITIS: ICD-10-CM

## 2021-06-10 DIAGNOSIS — J32.0 CHRONIC MAXILLARY SINUSITIS: ICD-10-CM

## 2021-06-10 DIAGNOSIS — J34.89 OSTIOMEATAL COMPLEX OBSTRUCTION OF PARANASAL SINUS: ICD-10-CM

## 2021-06-10 PROCEDURE — 99024 POSTOP FOLLOW-UP VISIT: CPT | Performed by: OTOLARYNGOLOGY

## 2021-06-10 ASSESSMENT — ENCOUNTER SYMPTOMS
APNEA: 0
DIARRHEA: 0
COUGH: 0
RHINORRHEA: 0
CHEST TIGHTNESS: 0
SHORTNESS OF BREATH: 0
SORE THROAT: 0
NAUSEA: 0
VOICE CHANGE: 0
CHOKING: 0
COLOR CHANGE: 0
ABDOMINAL PAIN: 0
STRIDOR: 0
WHEEZING: 0
VOMITING: 0
SINUS PRESSURE: 0
TROUBLE SWALLOWING: 0
FACIAL SWELLING: 0

## 2021-06-10 NOTE — PROGRESS NOTES
Pt has met discharge criteria and states he is ready for discharge to home. IV removed, gauze and tape applied. Dressed in own clothes and personal belongings gathered. Discharge instructions (with opioid medication education information) given to pt and family; pt and family verbalized understanding of discharge instructions, prescriptions and follow up appointments. Drip pad supplied sent home with pt. Pt transported to discharge lobby by [facility] staff.

## 2021-06-10 NOTE — PATIENT INSTRUCTIONS
Follow the instructions from yesterday's AVS form regarding continuing the Afrin, and starting the saline irrigations in 3 days. Use NeilMed bottle, their packets, and only distilled water at least twice a day. Flush up one nostril and out the other, leaning forward over sink. Stop the Afrin after 4 more days.
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
## 2021-06-10 NOTE — PROGRESS NOTES
[facility] [place] [facility]  [facility] EAR, NOSE AND THROAT  [facility]  Dept: [phone]  Dept Fax: [phone]  Loc: [phone]    Pat Kang is a 40 y.o. male who was referred byNo ref. provider found for:  Chief Complaint   Patient presents with    Post-Op Check     Patient is here post-op IGS, SMR, septo,franck ethmoid,antrostomy 6/9/2021   . HPI:     Pat Kang is a 40 y.o. male who presents today for Septoplasty, partial submucous resection of right inferior  turbinate; partial resection franck bullosa, right middle turbinate; partial right anterior endoscopic ethmoidectomy; right maxillary  antrostomy with removal of tissue; left maxillary antrostomy on 6/9/21. He is doing well. He is not rubbing on the nose. Feels like the right side is where all the swelling is. He has been using the drops and ointment. History:     No Known Allergies  Current Outpatient Medications   Medication Sig Dispense Refill    HYDROcodone-acetaminophen (NORCO) 7.5-325 MG per tablet Take 1 tablet by mouth every 6 hours as needed for Pain for up to 5 days. 20 tablet 0    predniSONE (DELTASONE) 20 MG tablet Take 1 tablet by mouth daily for 3 days Then one-half tablet for two days. Start the day AFTER surgery 4 tablet 0    sulfamethoxazole-trimethoprim (BACTRIM DS;SEPTRA DS) 800-160 MG per tablet Take 1 tablet by mouth 2 times daily for 14 days Drink plenty of fluids. Begin evening of surgery, when tolerating p.o. well 28 tablet 2    famotidine (PEPCID) 20 MG tablet Take 1 tablet by mouth 2 times daily Begin evening of surgery. This is not as needed.   Take twice a day till gone 60 tablet 0    Coenzyme Q10 (CO Q-10) 200 MG CAPS Take by mouth daily      pantoprazole (PROTONIX) 40 MG tablet take 1 tablet by mouth every morning before breakfast 90 tablet 2    levothyroxine (SYNTHROID) 50 MCG tablet take 1 tablet by mouth once daily 90 tablet 1 Cardiovascular: Negative for chest pain, palpitations and leg swelling. Gastrointestinal: Negative for abdominal pain, diarrhea, nausea and vomiting. Endocrine: Negative for cold intolerance, heat intolerance, polydipsia and polyuria. Genitourinary: Negative for dysuria, enuresis and hematuria. Musculoskeletal: Negative for arthralgias, gait problem, neck pain and neck stiffness. Skin: Negative for color change and rash. Allergic/Immunologic: Negative for environmental allergies, food allergies and immunocompromised state. Neurological: Negative for dizziness, syncope, facial asymmetry, speech difficulty, light-headedness and headaches. Hematological: Negative for adenopathy. Does not bruise/bleed easily. Psychiatric/Behavioral: Negative for confusion and sleep disturbance. The patient is not nervous/anxious. Objective:     /70 (Site: Left Upper Arm, Position: Sitting)   Pulse 80   Temp 97.6 °F (36.4 °C) (Infrared)   Resp 14   Ht 5' 10\" (1.778 m)   Wt 205 lb 4.8 oz (93.1 kg)   BMI 29.46 kg/m²     Physical Exam     Nose: Nose is decongested and anesthetized with topical sprays. Nasal fossa is suctioned bilaterally. Clots are removed. Normal postoperative appearance. Data:  All of the past medical history, past surgical history, family history,social history, allergies and current medications were reviewed with the patient. Assessment & Plan   Diagnoses and all orders for this visit:     Diagnosis Orders   1. Deviated nasal septum     2. Hypertrophy of nasal turbinates     3. Chronic ethmoidal sinusitis     4. Chronic maxillary sinusitis     5. Ostiomeatal complex obstruction of paranasal sinus     6. Status post functional endoscopic sinus surgery (FESS)         The findings were explained and his questions were answered. Follow the instructions from yesterday's AVS form regarding continuing the Afrin, and starting the saline irrigations in 3 days.   Use NeilMed bottle, their packets, and only distilled water at least twice a day. Flush up one nostril and out the other, leaning forward over sink. Stop the Afrin after 4 more days. I will see him in 1 week. He agreed. I, Omar Reynoso CMA ([facility]), am scribing for, and in the presence of Dr. Lexus Cuba. Electronically signed by Haylee Ramos CMA ([facility]) on 6/10/21 at 1:43 PM EDT. (Please note that portions of this note were completed with a voice recognition program. Efforts were made to edit the dictations butoccasionally words are mis-transcribed.)    I agree to the above documentation placed by my scribe. I have personally evaluated this patient. Additional findings are as noted. I reviewed the scribe's note and agree with the documented findings and plan of care. Any areas of disagreement are corrected. I agree with the chief complaint, past medical history, past surgical history, allergies, medications, social and family history as documented unless otherwise noted below.      Electronically signed by Cayetano Bravo MD on 6/26/2021 at 10:50 PM

## 2021-06-14 LAB
AEROBIC CULTURE: ABNORMAL
ANAEROBIC CULTURE: ABNORMAL
GRAM STAIN RESULT: ABNORMAL
ORGANISM: ABNORMAL

## 2021-06-15 ENCOUNTER — TELEPHONE (OUTPATIENT)
Dept: CARDIOLOGY CLINIC | Age: 46
End: 2021-06-15

## 2021-06-17 ENCOUNTER — OFFICE VISIT (OUTPATIENT)
Dept: ENT CLINIC | Age: 46
End: 2021-06-17

## 2021-06-17 VITALS
HEART RATE: 80 BPM | BODY MASS INDEX: 28.46 KG/M2 | RESPIRATION RATE: 14 BRPM | SYSTOLIC BLOOD PRESSURE: 118 MMHG | DIASTOLIC BLOOD PRESSURE: 78 MMHG | TEMPERATURE: 98.2 F | WEIGHT: 198.8 LBS | HEIGHT: 70 IN

## 2021-06-17 DIAGNOSIS — J32.2 CHRONIC ETHMOIDAL SINUSITIS: ICD-10-CM

## 2021-06-17 DIAGNOSIS — Z98.890 STATUS POST NASAL SEPTOPLASTY: ICD-10-CM

## 2021-06-17 DIAGNOSIS — J32.0 CHRONIC MAXILLARY SINUSITIS: ICD-10-CM

## 2021-06-17 DIAGNOSIS — J34.89 OSTIOMEATAL COMPLEX OBSTRUCTION OF PARANASAL SINUS: ICD-10-CM

## 2021-06-17 DIAGNOSIS — Z98.890 STATUS POST FUNCTIONAL ENDOSCOPIC SINUS SURGERY (FESS): ICD-10-CM

## 2021-06-17 DIAGNOSIS — J34.2 DEVIATED NASAL SEPTUM: Primary | ICD-10-CM

## 2021-06-17 DIAGNOSIS — J34.3 HYPERTROPHY OF NASAL TURBINATES: ICD-10-CM

## 2021-06-17 PROCEDURE — 99024 POSTOP FOLLOW-UP VISIT: CPT | Performed by: OTOLARYNGOLOGY

## 2021-06-17 ASSESSMENT — ENCOUNTER SYMPTOMS
SINUS PRESSURE: 0
SHORTNESS OF BREATH: 0
COUGH: 0
NAUSEA: 0
WHEEZING: 0
TROUBLE SWALLOWING: 0
CHOKING: 0
CHEST TIGHTNESS: 0
COLOR CHANGE: 0
RHINORRHEA: 0
VOMITING: 0
ABDOMINAL PAIN: 0
FACIAL SWELLING: 0
STRIDOR: 0
DIARRHEA: 0
VOICE CHANGE: 0
SORE THROAT: 0
APNEA: 0

## 2021-06-17 NOTE — PROGRESS NOTES
[facility] [place] [facility]  [facility] EAR, NOSE AND THROAT  [facility]  Dept: [phone]  Dept Fax: [phone]  Loc: [phone]    Ramonita Salinas is a 40 y.o. male who was referred byNo ref. provider found for:  Chief Complaint   Patient presents with    Post-Op Check     Patient is here post-op IGS, SMR, septo, franck, ethmoid, antrostomy 6/9/2021   . HPI:     Ramonita Salinas is a 40 y.o. male who presents today for  Post-op Septoplasty, partial submucous resection of right inferior  turbinate; partial resection franck bullosa, right middle turbinate;  partial right anterior endoscopic ethmoidectomy; right maxillary  antrostomy with removal of tissue; left maxillary antrostomy; on 6/9/21. He is doing well. Feeling like he is  More open in his nose now. History:     No Known Allergies  Current Outpatient Medications   Medication Sig Dispense Refill    ticagrelor (BRILINTA) 90 MG TABS tablet Take 90 mg by mouth 2 times daily      sulfamethoxazole-trimethoprim (BACTRIM DS;SEPTRA DS) 800-160 MG per tablet Take 1 tablet by mouth 2 times daily for 14 days Drink plenty of fluids. Begin evening of surgery, when tolerating p.o. well 28 tablet 2    famotidine (PEPCID) 20 MG tablet Take 1 tablet by mouth 2 times daily Begin evening of surgery. This is not as needed. Take twice a day till gone 60 tablet 0    Coenzyme Q10 (CO Q-10) 200 MG CAPS Take by mouth daily      pantoprazole (PROTONIX) 40 MG tablet take 1 tablet by mouth every morning before breakfast 90 tablet 2    levothyroxine (SYNTHROID) 50 MCG tablet take 1 tablet by mouth once daily 90 tablet 1    rosuvastatin (CRESTOR) 20 MG tablet Take 1 tablet by mouth daily 90 tablet 3    nitroGLYCERIN (NITROSTAT) 0.4 MG SL tablet Place 1 tablet under the tongue every 5 minutes as needed for Chest pain up to max of 3 total doses.  If no relief after 1 dose, call 911. 25 tablet 3     No of disagreement are corrected. I agree with the chief complaint, past medical history, past surgical history, allergies, medications, social and family history as documented unless otherwise noted below.      Electronically signed by Joshua Ashraf MD on 6/27/2021 at 9:12 PM

## 2021-06-21 DIAGNOSIS — J34.1 MAXILLARY SINUS CYST: ICD-10-CM

## 2021-06-21 DIAGNOSIS — J32.2 CHRONIC ETHMOIDAL SINUSITIS: ICD-10-CM

## 2021-06-21 DIAGNOSIS — J32.0 CHRONIC MAXILLARY SINUSITIS: ICD-10-CM

## 2021-06-21 DIAGNOSIS — J34.3 HYPERTROPHY OF NASAL TURBINATES: ICD-10-CM

## 2021-06-21 DIAGNOSIS — J34.2 DEVIATED NASAL SEPTUM: ICD-10-CM

## 2021-06-21 DIAGNOSIS — J34.89 OSTIOMEATAL COMPLEX OBSTRUCTION OF PARANASAL SINUS: ICD-10-CM

## 2021-06-21 DIAGNOSIS — R51.9 RHINOGENIC HEADACHE: ICD-10-CM

## 2021-06-21 RX ORDER — HYDROCODONE BITARTRATE AND ACETAMINOPHEN 7.5; 325 MG/1; MG/1
1 TABLET ORAL EVERY 6 HOURS PRN
Qty: 20 TABLET | Refills: 0 | Status: SHIPPED | OUTPATIENT
Start: 2021-06-21 | End: 2021-06-25 | Stop reason: ALTCHOICE

## 2021-06-25 ENCOUNTER — OFFICE VISIT (OUTPATIENT)
Dept: ENT CLINIC | Age: 46
End: 2021-06-25
Payer: COMMERCIAL

## 2021-06-25 VITALS
TEMPERATURE: 97.6 F | WEIGHT: 198 LBS | HEIGHT: 69 IN | SYSTOLIC BLOOD PRESSURE: 108 MMHG | RESPIRATION RATE: 14 BRPM | DIASTOLIC BLOOD PRESSURE: 70 MMHG | BODY MASS INDEX: 29.33 KG/M2 | HEART RATE: 76 BPM

## 2021-06-25 DIAGNOSIS — J34.89 CONCHA BULLOSA: ICD-10-CM

## 2021-06-25 DIAGNOSIS — Z98.890 STATUS POST FUNCTIONAL ENDOSCOPIC SINUS SURGERY (FESS): ICD-10-CM

## 2021-06-25 DIAGNOSIS — J34.2 DEVIATED NASAL SEPTUM: Primary | ICD-10-CM

## 2021-06-25 DIAGNOSIS — J34.3 HYPERTROPHY OF NASAL TURBINATES: ICD-10-CM

## 2021-06-25 DIAGNOSIS — J34.89 OSTIOMEATAL COMPLEX OBSTRUCTION OF PARANASAL SINUS: ICD-10-CM

## 2021-06-25 DIAGNOSIS — J32.0 CHRONIC MAXILLARY SINUSITIS: ICD-10-CM

## 2021-06-25 DIAGNOSIS — J32.2 CHRONIC ETHMOIDAL SINUSITIS: ICD-10-CM

## 2021-06-25 PROCEDURE — 31237 NSL/SINS NDSC SURG BX POLYPC: CPT | Performed by: OTOLARYNGOLOGY

## 2021-06-25 PROCEDURE — 99024 POSTOP FOLLOW-UP VISIT: CPT | Performed by: OTOLARYNGOLOGY

## 2021-06-25 ASSESSMENT — ENCOUNTER SYMPTOMS
RHINORRHEA: 0
COLOR CHANGE: 0
TROUBLE SWALLOWING: 0
CHEST TIGHTNESS: 0
VOICE CHANGE: 0
WHEEZING: 0
COUGH: 0
ABDOMINAL PAIN: 0
FACIAL SWELLING: 0
APNEA: 0
SHORTNESS OF BREATH: 0
SINUS PRESSURE: 0
VOMITING: 0
CHOKING: 0
SORE THROAT: 0
DIARRHEA: 0
STRIDOR: 0
NAUSEA: 0

## 2021-06-25 NOTE — PROGRESS NOTES
[facility] [place] [facility]  [facility] EAR, NOSE AND THROAT  [facility]  Dept: [phone]  Dept Fax: [phone]  Loc: [phone]    Dempsey Boeck is a 40 y.o. male who was referred byNo ref. provider found for:  Chief Complaint   Patient presents with    Post-Op Check     Patient is here post-op IGS,SMR,septo,franck, ethmoid, antrostomy 6/9/21   . HPI:     Dempsey Boeck is a 40 y.o. male who presents today for Post op Septoplasty, partial submucous resection of right inferior  turbinate; partial resection franck bullosa, right middle turbinate;  partial right anterior endoscopic ethmoidectomy; right maxillary  antrostomy with removal of tissue; left maxillary antrostomy on 6/9/21. He is rinsing. History:     No Known Allergies  Current Outpatient Medications   Medication Sig Dispense Refill    ticagrelor (BRILINTA) 90 MG TABS tablet Take 90 mg by mouth 2 times daily      famotidine (PEPCID) 20 MG tablet Take 1 tablet by mouth 2 times daily Begin evening of surgery. This is not as needed. Take twice a day till gone 60 tablet 0    Coenzyme Q10 (CO Q-10) 200 MG CAPS Take by mouth daily      pantoprazole (PROTONIX) 40 MG tablet take 1 tablet by mouth every morning before breakfast 90 tablet 2    levothyroxine (SYNTHROID) 50 MCG tablet take 1 tablet by mouth once daily 90 tablet 1    rosuvastatin (CRESTOR) 20 MG tablet Take 1 tablet by mouth daily 90 tablet 3    nitroGLYCERIN (NITROSTAT) 0.4 MG SL tablet Place 1 tablet under the tongue every 5 minutes as needed for Chest pain up to max of 3 total doses. If no relief after 1 dose, call 911. 25 tablet 3     No current facility-administered medications for this visit.      Past Medical History:   Diagnosis Date    CAD (coronary artery disease)     Hyperlipidemia     Hypertension     MI, old 2019    Thyroid disease       Past Surgical History:   Procedure Laterality Date    CORONARY ANGIOPLASTY WITH STENT PLACEMENT  2019    x3    SINUS ENDOSCOPY N/A 6/9/2021    IGS NASAL ENDOSCOPY WITH PARTIAL RESECTION BARR[place] BULLOSA RIGHT MIDDLE TURBINATE, PARTIAL ANTERIOR ETHMOID MAXILLARY ANTROSTOMY WITH REMOVAL OF TISSUE FROM MAXILLARY SINUS RIGHT, MAXILLARY ANTROSTOMY LEFT, SEPTOPLASTY SUBMUCOUS RESECT TURBINATES PARTIAL RIGHT INFERIOR TURBINATE performed by Halford Closs, MD at [facility]       Family History   Problem Relation Age of Onset    Heart Disease Mother     High Blood Pressure Mother     Stroke Mother     High Blood Pressure Father     Cancer Sister     Diabetes Neg Hx      Social History     Tobacco Use    Smoking status: Never Smoker    Smokeless tobacco: Never Used   Substance Use Topics    Alcohol use: Yes     Comment: few drinks a few days       Subjective:       Review of Systems   Constitutional: Negative for activity change, appetite change, chills, diaphoresis, fatigue, fever and unexpected weight change. HENT: Negative for congestion, dental problem, ear discharge, ear pain, facial swelling, hearing loss, mouth sores, nosebleeds, postnasal drip, rhinorrhea, sinus pressure, sneezing, sore throat, tinnitus, trouble swallowing and voice change. Eyes: Negative for visual disturbance. Respiratory: Negative for apnea, cough, choking, chest tightness, shortness of breath, wheezing and stridor. Cardiovascular: Negative for chest pain, palpitations and leg swelling. Gastrointestinal: Negative for abdominal pain, diarrhea, nausea and vomiting. Endocrine: Negative for cold intolerance, heat intolerance, polydipsia and polyuria. Genitourinary: Negative for dysuria, enuresis and hematuria. Musculoskeletal: Negative for arthralgias, gait problem, neck pain and neck stiffness. Skin: Negative for color change and rash. Allergic/Immunologic: Negative for environmental allergies, food allergies and immunocompromised state. Neurological: Negative for dizziness, syncope, facial asymmetry, speech difficulty, light-headedness and headaches. Hematological: Negative for adenopathy. Does not bruise/bleed easily. Psychiatric/Behavioral: Negative for confusion and sleep disturbance. The patient is not nervous/anxious. Objective:     /70 (Site: Left Upper Arm, Position: Sitting)   Pulse 76   Temp 97.6 °F (36.4 °C) (Infrared)   Resp 14   Ht 5' 9\" (1.753 m)   Wt 198 lb (89.8 kg)   BMI 29.24 kg/m²     Physical Exam      PROCEDURE NOTE: SINUS DEBRIDEMENT     Nasal cavity was topically anesthetized and decongested. Rigid nasal endoscopy was performed and debris was removed from the middle meatus on on the Bilateral side using suction and/or instrumentation. Blood clots and adhesions were addressed. Mucosa appears to be healing well. The patient tolerated the procedure well. Data:  All of the past medical history, past surgical history, family history,social history, allergies and current medications were reviewed with the patient. Assessment & Plan   Diagnoses and all orders for this visit:     Diagnosis Orders   1. Deviated nasal septum     2. Hypertrophy of nasal turbinates     3. Pina bullosa     4. Chronic ethmoidal sinusitis     5. Chronic maxillary sinusitis     6. Ostiomeatal complex obstruction of paranasal sinus     7. Status post functional endoscopic sinus surgery (FESS)         The findings were explained and his questions were answered. Options were discussed. Continue rinsing. Follow up July 15th. He agreed. Omi PANCHAL CMA (AAW), am scribing for, and in the presence of Dr. Shankar Duke. Electronically signed by DEXTER Ramos) on 6/25/21 at 1:28 PM EDT.      (Please note that portions of this note were completed with a voice recognition program. Efforts were made to edit the dictations butoccasionally words are mis-transcribed.)    I agree to the above documentation placed by my scribe. I have personally evaluated this patient. Additional findings are as noted. I reviewed the scribe's note and agree with the documented findings and plan of care. Any areas of disagreement are corrected. I agree with the chief complaint, past medical history, past surgical history, allergies, medications, social and family history as documented unless otherwise noted below.      Electronically signed by Jermaine Bangura MD on 7/11/2021 at 8:21 PM

## 2021-06-30 ENCOUNTER — TELEPHONE (OUTPATIENT)
Dept: ENT CLINIC | Age: 46
End: 2021-06-30

## 2021-06-30 NOTE — TELEPHONE ENCOUNTER
Patient called in and left a voicemail on the clinical phone stating when he was seen by Dr. Maurer 06/25/2021, he had a bad reaction to \"the lidocaine that was placed into nose\". He stated since Friday he is having close to passing out spells where when he stands up or moves quickly is getting light headed and almost passing out. Please advise.

## 2021-06-30 NOTE — TELEPHONE ENCOUNTER
If he is still experiencing symptoms at this time it is unlikely to be related to the lidocaine. The generally small amount of lidocaine that we use in the nose does not stay in your system for 5 days to cause persistent symptoms. Commonly, people can experience dizziness/lightheadedness when standing/moving too quickly and is generally related to a drop in blood pressure with sudden changes in position, especially when going from laying to seated position or seated to standing position. This is commonly called orthostatic hypotension. I would recommend increasing his water intake and discussing this further with his PCP. In the meantime, I would strongly urge him to anticipate his dizziness with changing of positions and to take his time when doing this is to avoid a fall/passing out. If symptoms continue to worsen/persist, I would recommend going to the ER for evaluation to ensure there are no other sources for his symptoms, especially if he develops bluish color changes to his skin or severe headache.

## 2021-07-02 NOTE — TELEPHONE ENCOUNTER
[facility] and [facility] couldn't help so he is going to er. He was concerned that with recent surgery it may cause more harm but I advised him to let them know what had been done and they should be able to see his records on care everywhere.

## 2021-07-02 NOTE — TELEPHONE ENCOUNTER
Spoke with patient and he states that they drove down to [place] and are going on an hour and a half bloody nose. He stated it is only one side and he is spitting up blood clots. Patient stated he is on a blood thinner. Spoke with Traci Jimenez and he stated that the patient can lightly blow his nose to get any clots out and then spray 3 sprays of Afrin nasal spray into the nostril that is bleeding. Then he is to pinch his nose shut for 15 minutes and do not lift up to check on it. Patient was instructed to lean forward while he is pinching for a full 1 minutes. Patient was informed if this does not get it to stop he will need to go to urgent care and have someone look at it since it will be going on 2 hours. Patient stated he brought his nasal spray and verbalized understanding and thanked me.

## 2021-07-02 NOTE — TELEPHONE ENCOUNTER
Patient says he didn't receive vm. I informed of Yazmin advice. He says they drove to [facility] yesterday. He has had a nosebleed over one and a half hours. It was pooling in his throat at times. He has never had this happen previously. He is on blood thinners.   Transferred to [facility]

## 2021-07-02 NOTE — TELEPHONE ENCOUNTER
Agree with plan. Pressure should be held for 10+ minutes. He should not let go to check if still bleeding before that.

## 2021-07-20 ENCOUNTER — OFFICE VISIT (OUTPATIENT)
Dept: ENT CLINIC | Age: 46
End: 2021-07-20
Payer: COMMERCIAL

## 2021-07-20 VITALS
RESPIRATION RATE: 18 BRPM | TEMPERATURE: 97 F | SYSTOLIC BLOOD PRESSURE: 126 MMHG | HEART RATE: 74 BPM | BODY MASS INDEX: 29.09 KG/M2 | WEIGHT: 197 LBS | DIASTOLIC BLOOD PRESSURE: 84 MMHG

## 2021-07-20 DIAGNOSIS — J32.2 CHRONIC ETHMOIDAL SINUSITIS: ICD-10-CM

## 2021-07-20 DIAGNOSIS — J34.2 DEVIATED NASAL SEPTUM: ICD-10-CM

## 2021-07-20 DIAGNOSIS — Z98.890 STATUS POST FUNCTIONAL ENDOSCOPIC SINUS SURGERY (FESS): ICD-10-CM

## 2021-07-20 DIAGNOSIS — J31.0 PURULENT RHINITIS: Primary | ICD-10-CM

## 2021-07-20 DIAGNOSIS — J34.3 HYPERTROPHY OF NASAL TURBINATES: ICD-10-CM

## 2021-07-20 DIAGNOSIS — J32.0 CHRONIC MAXILLARY SINUSITIS: ICD-10-CM

## 2021-07-20 DIAGNOSIS — Z98.890 STATUS POST NASAL SEPTOPLASTY: ICD-10-CM

## 2021-07-20 PROCEDURE — 31237 NSL/SINS NDSC SURG BX POLYPC: CPT | Performed by: OTOLARYNGOLOGY

## 2021-07-20 PROCEDURE — 99024 POSTOP FOLLOW-UP VISIT: CPT | Performed by: OTOLARYNGOLOGY

## 2021-07-20 RX ORDER — SULFAMETHOXAZOLE AND TRIMETHOPRIM 800; 160 MG/1; MG/1
1 TABLET ORAL 2 TIMES DAILY
Qty: 40 TABLET | Refills: 1 | Status: SHIPPED | OUTPATIENT
Start: 2021-07-20 | End: 2021-08-09

## 2021-07-20 ASSESSMENT — ENCOUNTER SYMPTOMS
SINUS PRESSURE: 1
WHEEZING: 0
CHOKING: 0
ABDOMINAL PAIN: 0
VOMITING: 0
RHINORRHEA: 0
SHORTNESS OF BREATH: 0
APNEA: 0
DIARRHEA: 0
STRIDOR: 0
FACIAL SWELLING: 0
COUGH: 0
COLOR CHANGE: 0
VOICE CHANGE: 0
SORE THROAT: 0
TROUBLE SWALLOWING: 0
CHEST TIGHTNESS: 0
NAUSEA: 0

## 2021-07-20 NOTE — PROGRESS NOTES
[facility] [place] [facility]  [facility] EAR, NOSE AND THROAT  [facility]  Dept: [phone]  Dept Fax: [phone]  Loc: [phone]    Graciela Frazier is a 40 y.o. male who was referred byNo ref. provider found for:  Chief Complaint   Patient presents with   [employer] Post-Op Check     Patient is here for post op 6/9 igs,smr,septo,franck,ethmoid. Patient states all the pain and pressure is on the right side, headache is on right side   . HPI:     Graciela Frazier is a 40 y.o. male who presents today for follow-up on his nose and sinus surgery. He has been having purulent drainage. He has been irrigating some. History:     No Known Allergies  Current Outpatient Medications   Medication Sig Dispense Refill    famotidine (PEPCID) 20 MG tablet Take 1 tablet by mouth 2 times daily Begin evening of surgery. This is not as needed. Take twice a day till gone 60 tablet 0    Coenzyme Q10 (CO Q-10) 200 MG CAPS Take by mouth daily      pantoprazole (PROTONIX) 40 MG tablet take 1 tablet by mouth every morning before breakfast 90 tablet 2    levothyroxine (SYNTHROID) 50 MCG tablet take 1 tablet by mouth once daily 90 tablet 1    rosuvastatin (CRESTOR) 20 MG tablet Take 1 tablet by mouth daily 90 tablet 3    nitroGLYCERIN (NITROSTAT) 0.4 MG SL tablet Place 1 tablet under the tongue every 5 minutes as needed for Chest pain up to max of 3 total doses. If no relief after 1 dose, call 911. 25 tablet 3    ticagrelor (BRILINTA) 90 MG TABS tablet Take 1 tablet by mouth 2 times daily 180 tablet 3    carvedilol (COREG) 6.25 MG tablet Take 1 tablet by mouth 2 times daily (with meals) 180 tablet 3     No current facility-administered medications for this visit.      Past Medical History:   Diagnosis Date    CAD (coronary artery disease)     Hyperlipidemia     Hypertension     MI, old 2019    Thyroid disease       Past Surgical History:   Procedure Laterality Date    CORONARY ANGIOPLASTY WITH STENT PLACEMENT  2019    x3    SINUS ENDOSCOPY N/A 6/9/2021    IGS NASAL ENDOSCOPY WITH PARTIAL RESECTION B[place] BULLOSA RIGHT MIDDLE TURBINATE, PARTIAL ANTERIOR ETHMOID MAXILLARY ANTROSTOMY WITH REMOVAL OF TISSUE FROM MAXILLARY SINUS RIGHT, MAXILLARY ANTROSTOMY LEFT, SEPTOPLASTY SUBMUCOUS RESECT TURBINATES PARTIAL RIGHT INFERIOR TURBINATE performed by Jay Snell MD at [facility]       Family History   Problem Relation Age of Onset    Heart Disease Mother     High Blood Pressure Mother     Stroke Mother     High Blood Pressure Father     Cancer Sister     Diabetes Neg Hx      Social History     Tobacco Use    Smoking status: Never Smoker    Smokeless tobacco: Never Used   Substance Use Topics    Alcohol use: Yes     Comment: few drinks a few days       Subjective:      Review of Systems   Constitutional: Negative for activity change, appetite change, chills, diaphoresis, fatigue, fever and unexpected weight change. HENT: Positive for congestion, ear pain, nosebleeds and sinus pressure. Negative for dental problem, ear discharge, facial swelling, hearing loss, mouth sores, postnasal drip, rhinorrhea, sneezing, sore throat, tinnitus, trouble swallowing and voice change. Eyes: Negative for visual disturbance. Respiratory: Negative for apnea, cough, choking, chest tightness, shortness of breath, wheezing and stridor. Cardiovascular: Negative for chest pain, palpitations and leg swelling. Gastrointestinal: Negative for abdominal pain, diarrhea, nausea and vomiting. Endocrine: Negative for cold intolerance, heat intolerance, polydipsia and polyuria. Genitourinary: Negative for dysuria, enuresis and hematuria. Musculoskeletal: Negative for arthralgias, gait problem, neck pain and neck stiffness. Skin: Negative for color change and rash.    Allergic/Immunologic: Negative for environmental allergies, food allergies and immunocompromised state.   Neurological: Positive for headaches. Negative for dizziness, syncope, facial asymmetry, speech difficulty and light-headedness. Hematological: Negative for adenopathy. Does not bruise/bleed easily. Psychiatric/Behavioral: Negative for confusion and sleep disturbance. The patient is not nervous/anxious. Objective:   /84 (Site: Right Upper Arm, Position: Sitting)   Pulse 74   Temp 97 °F (36.1 °C) (Infrared)   Resp 18   Wt 197 lb (89.4 kg)   BMI 29.09 kg/m²     Physical Exam   Nose: Decongested anesthetized with topical sprays. There were thick secretions and crusting in both middle meati. PROCEDURE NOTE: SINUS DEBRIDEMENT     Nasal cavity was topically anesthetized and decongested. Rigid nasal endoscopy was performed and debris was removed from the middle meatus on the Bilateral side using suction and/or instrumentation. Crusts and pus were addressed. Culture was taken. Mucosa appears to be healing well but there was diffuse erythema. Operated sinus drainage pathways were open. The patient tolerated the procedure well. Data:  All of the past medical history, past surgical history, family history,social history, allergies and current medications were reviewed with the patient. Assessment & Plan   Diagnoses and all orders for this visit:     Diagnosis Orders   1. Purulent rhinitis  Culture, Nasal    ID NASAL SCOPE,BX/RMV POLYP/DEBRID   2. Status post nasal septoplasty  Culture, Nasal   3. Status post functional endoscopic sinus surgery (FESS)  Culture, Nasal    ID NASAL SCOPE,BX/RMV POLYP/DEBRID   4. Deviated nasal septum     5. Hypertrophy of nasal turbinates     6. Chronic ethmoidal sinusitis  ID NASAL SCOPE,BX/RMV POLYP/DEBRID   7. Chronic maxillary sinusitis  ID NASAL SCOPE,BX/RMV POLYP/DEBRID       The findings were explained and his questions were answered. Patient started on Sulfatrim pending the culture results.       Return in about 4 weeks (around 8/17/2021) for Post-Op Check. Dhruv Almaguer. Andrew Villar MD    **This report has been created using voice recognition software. It may contain minor errors which are inherent in voicerecognition technology. **

## 2021-07-22 LAB — AEROBIC CULTURE: NORMAL

## 2021-08-11 RX ORDER — CARVEDILOL 6.25 MG/1
6.25 TABLET ORAL 2 TIMES DAILY WITH MEALS
Qty: 180 TABLET | Refills: 3 | OUTPATIENT
Start: 2021-08-11

## 2021-08-12 NOTE — TELEPHONE ENCOUNTER
Patient state he has been out of his coreg for a couple days and would like this prescribed. He is checking with his cardiologist for [address]. He stated Dr. Maxwell Bi him to call office for the refills. He had nasal surgery in the past and they had him stop them for a little while but is suppose to continue both medications.

## 2021-08-13 RX ORDER — CARVEDILOL 6.25 MG/1
6.25 TABLET ORAL 2 TIMES DAILY WITH MEALS
Qty: 180 TABLET | Refills: 3 | Status: SHIPPED | OUTPATIENT
Start: 2021-08-13 | End: 2022-11-03

## 2021-08-17 ENCOUNTER — TELEPHONE (OUTPATIENT)
Dept: ENT CLINIC | Age: 46
End: 2021-08-17

## 2021-08-17 NOTE — TELEPHONE ENCOUNTER
Patient stated he is doing a little better his right side is congested. Patient stated with school starting up tomorrow, his current appointment is the only time that works for him. He also denied a refill of the antibiotic at this time.

## 2021-08-28 DIAGNOSIS — R79.89 ELEVATED TSH: ICD-10-CM

## 2021-08-30 RX ORDER — LEVOTHYROXINE SODIUM 0.05 MG/1
TABLET ORAL
Qty: 90 TABLET | Refills: 1 | Status: SHIPPED | OUTPATIENT
Start: 2021-08-30 | End: 2021-12-21 | Stop reason: SDUPTHER

## 2021-10-07 ENCOUNTER — TELEPHONE (OUTPATIENT)
Dept: CARDIOLOGY CLINIC | Age: 46
End: 2021-10-07

## 2021-10-07 DIAGNOSIS — I25.119 CORONARY ARTERY DISEASE INVOLVING NATIVE CORONARY ARTERY OF NATIVE HEART WITH ANGINA PECTORIS (HCC): Primary | ICD-10-CM

## 2021-10-07 DIAGNOSIS — E78.2 MIXED HYPERLIPIDEMIA: ICD-10-CM

## 2021-10-07 NOTE — TELEPHONE ENCOUNTER
Please see My Chart Message: Next appt 4/22    Chantal Juarez MD 2 hours ago (8:35 AM)   [facility]  Dr. Jorge Arellano, I know we had talked at my last checkup about changing my cholesterol medication because of the side effects. I started on Lipitor, which caused severe legs aches. After a while, it was switched to Crestor and the result has been the same. My legs ache everyday, have lost a lot of muscle tone, and make it very difficult to sleep. We had talked about switching to Repatha and at this point, I am willing to try anything because the aches and pains I am having is affecting my quality of life on a daily basis. I would like to switch if possible or set up an appointment to discuss this possible change. Thank you and have a great day.

## 2021-10-08 NOTE — TELEPHONE ENCOUNTER
Pt responded. Lipid panel faxed to [facility] in [facility]. Script and paperwork in Dr Brown Curl box to sign. Will await lab work and fax to [facility].

## 2021-10-08 NOTE — TELEPHONE ENCOUNTER
Pt is willing to try Repatha. Ordered and sent out for Dr Sanam Taylor to sign. Sent message to pt to have a new Lipid panel drawn as well. Waiting to see where he has labs draw. Refill requested.   Last seen: 12/7/18  Follow up appointment: none  Last filled: 1/7/19, #90     Scripts eprescribed to pharmacy

## 2021-10-09 LAB
CHOLESTEROL/HDL RATIO: 3 (ref 1–5)
CHOLESTEROL: 124 MG/DL (ref 150–200)
HDLC SERPL-MCNC: 41 MG/DL
LDL CHOLESTEROL CALCULATED: 12 MG/DL
LDL/HDL RATIO: 0.3
TRIGL SERPL-MCNC: 353 MG/DL (ref 27–150)
VLDLC SERPL CALC-MCNC: 71 MG/DL (ref 0–30)

## 2021-10-13 RX ORDER — ROSUVASTATIN CALCIUM 20 MG/1
TABLET, COATED ORAL
Qty: 90 TABLET | Refills: 2 | Status: SHIPPED | OUTPATIENT
Start: 2021-10-13 | End: 2022-02-10 | Stop reason: ALTCHOICE

## 2021-10-25 ENCOUNTER — TELEPHONE (OUTPATIENT)
Dept: CARDIOLOGY CLINIC | Age: 46
End: 2021-10-25

## 2021-10-26 RX ORDER — CHLORAL HYDRATE 500 MG
2000 CAPSULE ORAL 2 TIMES DAILY
COMMUNITY

## 2021-11-04 DIAGNOSIS — I10 ESSENTIAL HYPERTENSION: ICD-10-CM

## 2021-11-04 NOTE — TELEPHONE ENCOUNTER
----- Message from [place] sent at 11/4/2021  4:12 PM EDT -----  Subject: Refill Request    QUESTIONS  Name of Medication? Other - Losartan HCTZ   Patient-reported dosage and instructions? 50-12.5mg tablet, 1 tablet daily  How many days do you have left? 0  Preferred Pharmacy? [address]  phone number (if available)? [phone]  Additional Information for Provider? Pt sees Dr. Katya Lara & needs a refill   of this medication asap- he is out.   ---------------------------------------------------------------------------  --------------  [address]  What is the best way for the office to contact you? OK to leave message on   voicemail  Preferred Call Back Phone Number?  [phone]

## 2021-11-05 RX ORDER — LOSARTAN POTASSIUM AND HYDROCHLOROTHIAZIDE 12.5; 5 MG/1; MG/1
TABLET ORAL
Qty: 90 TABLET | Refills: 3 | Status: SHIPPED | OUTPATIENT
Start: 2021-11-05 | End: 2022-11-03

## 2021-12-21 DIAGNOSIS — R79.89 ELEVATED TSH: ICD-10-CM

## 2021-12-21 RX ORDER — LEVOTHYROXINE SODIUM 0.05 MG/1
TABLET ORAL
Qty: 90 TABLET | Refills: 3 | Status: SHIPPED | OUTPATIENT
Start: 2021-12-21

## 2021-12-21 RX ORDER — PANTOPRAZOLE SODIUM 40 MG/1
TABLET, DELAYED RELEASE ORAL
Qty: 90 TABLET | Refills: 3 | Status: SHIPPED | OUTPATIENT
Start: 2021-12-21

## 2022-02-10 ENCOUNTER — OFFICE VISIT (OUTPATIENT)
Dept: FAMILY MEDICINE CLINIC | Age: 47
End: 2022-02-10
Payer: COMMERCIAL

## 2022-02-10 VITALS
TEMPERATURE: 98 F | HEART RATE: 82 BPM | RESPIRATION RATE: 16 BRPM | OXYGEN SATURATION: 98 % | SYSTOLIC BLOOD PRESSURE: 122 MMHG | BODY MASS INDEX: 29.68 KG/M2 | DIASTOLIC BLOOD PRESSURE: 86 MMHG | WEIGHT: 201 LBS

## 2022-02-10 DIAGNOSIS — I25.119 CORONARY ARTERY DISEASE INVOLVING NATIVE CORONARY ARTERY OF NATIVE HEART WITH ANGINA PECTORIS (HCC): ICD-10-CM

## 2022-02-10 DIAGNOSIS — J32.0 CHRONIC MAXILLARY SINUSITIS: Primary | ICD-10-CM

## 2022-02-10 DIAGNOSIS — I10 PRIMARY HYPERTENSION: ICD-10-CM

## 2022-02-10 DIAGNOSIS — E78.2 MIXED HYPERLIPIDEMIA: ICD-10-CM

## 2022-02-10 PROCEDURE — G8417 CALC BMI ABV UP PARAM F/U: HCPCS | Performed by: FAMILY MEDICINE

## 2022-02-10 PROCEDURE — G8484 FLU IMMUNIZE NO ADMIN: HCPCS | Performed by: FAMILY MEDICINE

## 2022-02-10 PROCEDURE — 96372 THER/PROPH/DIAG INJ SC/IM: CPT | Performed by: FAMILY MEDICINE

## 2022-02-10 PROCEDURE — G8427 DOCREV CUR MEDS BY ELIG CLIN: HCPCS | Performed by: FAMILY MEDICINE

## 2022-02-10 PROCEDURE — 99213 OFFICE O/P EST LOW 20 MIN: CPT | Performed by: FAMILY MEDICINE

## 2022-02-10 PROCEDURE — 1036F TOBACCO NON-USER: CPT | Performed by: FAMILY MEDICINE

## 2022-02-10 RX ORDER — CEFDINIR 300 MG/1
300 CAPSULE ORAL 2 TIMES DAILY
Qty: 42 CAPSULE | Refills: 0 | Status: SHIPPED | OUTPATIENT
Start: 2022-02-10 | End: 2022-03-03

## 2022-02-10 RX ORDER — METHYLPREDNISOLONE ACETATE 80 MG/ML
160 INJECTION, SUSPENSION INTRA-ARTICULAR; INTRALESIONAL; INTRAMUSCULAR; SOFT TISSUE ONCE
Status: COMPLETED | OUTPATIENT
Start: 2022-02-10 | End: 2022-02-10

## 2022-02-10 RX ADMIN — METHYLPREDNISOLONE ACETATE 160 MG: 80 INJECTION, SUSPENSION INTRA-ARTICULAR; INTRALESIONAL; INTRAMUSCULAR; SOFT TISSUE at 13:58

## 2022-02-10 ASSESSMENT — PATIENT HEALTH QUESTIONNAIRE - PHQ9
SUM OF ALL RESPONSES TO PHQ QUESTIONS 1-9: 0
SUM OF ALL RESPONSES TO PHQ9 QUESTIONS 1 & 2: 0
1. LITTLE INTEREST OR PLEASURE IN DOING THINGS: 0
SUM OF ALL RESPONSES TO PHQ QUESTIONS 1-9: 0
SUM OF ALL RESPONSES TO PHQ QUESTIONS 1-9: 0
2. FEELING DOWN, DEPRESSED OR HOPELESS: 0
SUM OF ALL RESPONSES TO PHQ QUESTIONS 1-9: 0

## 2022-02-10 NOTE — PROGRESS NOTES
Administrations This Visit     methylPREDNISolone acetate (DEPO-MEDROL) injection 160 mg     Admin Date  02/10/2022  13:58 Action  Given Dose  160 mg Route  IntraMUSCular Site  Deltoid Left Administered By  Destinee Ramirez RN    Ordering Provider: Cydney Martinez MD    [address]: [id number]    Lot#: [id number]    : [address]    Patient Supplied?: No

## 2022-02-13 PROBLEM — I10 PRIMARY HYPERTENSION: Status: ACTIVE | Noted: 2022-02-13

## 2022-02-13 PROBLEM — E78.2 MIXED HYPERLIPIDEMIA: Status: ACTIVE | Noted: 2022-02-13

## 2022-02-13 ASSESSMENT — ENCOUNTER SYMPTOMS
NAUSEA: 0
DIARRHEA: 0
COUGH: 0
SINUS PRESSURE: 1
SHORTNESS OF BREATH: 0
EYE PAIN: 0
RHINORRHEA: 0
ABDOMINAL DISTENTION: 0
CONSTIPATION: 0
ABDOMINAL PAIN: 0
SINUS PAIN: 1
SORE THROAT: 0

## 2022-02-13 NOTE — PROGRESS NOTES
[address] [place] [facility] [address]  Dept: [phone]  Dept Fax: [phone]  Loc: [phone]  PROGRESS NOTE      VisitDate: 2/10/2022    Wesly Trevizo is a 40 y.o. male who presents today for:     Chief Complaint   Patient presents with    Headache     c/o HA, ears crackling, pressure in teeth. Had sinus surgery last summer. Subjective:  HPI  Patient history of sinus surgery for chronic sinus problems as well as septal deviation comes in with several week history of increasing sinus pain pressure ears cracking and popping, no relief with over-the-counter medications    Review of Systems   Constitutional: Negative for appetite change and fever. HENT: Positive for ear pain, sinus pressure and sinus pain. Negative for congestion, postnasal drip, rhinorrhea and sore throat. Eyes: Negative for pain and visual disturbance. Respiratory: Negative for cough and shortness of breath. Cardiovascular: Negative for chest pain. Gastrointestinal: Negative for abdominal distention, abdominal pain, constipation, diarrhea and nausea. Genitourinary: Negative for dysuria, frequency and urgency. Musculoskeletal: Negative for arthralgias. Skin: Negative for rash. Neurological: Negative for dizziness.      Past Medical History:   Diagnosis Date    CAD (coronary artery disease)     Hyperlipidemia     Hypertension     MI, old 2019    Thyroid disease       Past Surgical History:   Procedure Laterality Date    CORONARY ANGIOPLASTY WITH STENT PLACEMENT  2019    x3    SINUS ENDOSCOPY N/A 6/9/2021    IGS NASAL ENDOSCOPY WITH PARTIAL RESECTION B[place] BULLOSA RIGHT MIDDLE TURBINATE, PARTIAL ANTERIOR ETHMOID MAXILLARY ANTROSTOMY WITH REMOVAL OF TISSUE FROM MAXILLARY SINUS RIGHT, MAXILLARY ANTROSTOMY LEFT, SEPTOPLASTY SUBMUCOUS RESECT TURBINATES PARTIAL RIGHT INFERIOR TURBINATE performed by Misti Guardado MD at [facility] TOOTH EXTRACTION       Family History   Problem Relation Age of Onset    Heart Disease Mother     High Blood Pressure Mother     Stroke Mother     High Blood Pressure Father     Cancer Sister     Diabetes Neg Hx      Social History     Tobacco Use    Smoking status: Never Smoker    Smokeless tobacco: Never Used   Substance Use Topics    Alcohol use: Yes     Comment: few drinks a few days      Current Outpatient Medications   Medication Sig Dispense Refill    cefdinir (OMNICEF) 300 MG capsule Take 1 capsule by mouth 2 times daily for 21 days 42 capsule 0    Evolocumab 140 MG/ML SOSY Inject 1 mL into the skin every 14 days 6 each 3    levothyroxine (SYNTHROID) 50 MCG tablet take 1 tablet by mouth once daily 90 tablet 3    pantoprazole (PROTONIX) 40 MG tablet take 1 tablet by mouth every morning before breakfast 90 tablet 3    losartan-hydroCHLOROthiazide (HYZAAR) 50-12.5 MG per tablet take 1 tablet by mouth once daily 90 tablet 3    Omega-3 Fatty Acids (FISH OIL) 1000 MG CAPS Take 2,000 mg by mouth 2 times daily      ticagrelor (BRILINTA) 90 MG TABS tablet Take 1 tablet by mouth 2 times daily 180 tablet 3    carvedilol (COREG) 6.25 MG tablet Take 1 tablet by mouth 2 times daily (with meals) 180 tablet 3    Coenzyme Q10 (CO Q-10) 200 MG CAPS Take by mouth daily      nitroGLYCERIN (NITROSTAT) 0.4 MG SL tablet Place 1 tablet under the tongue every 5 minutes as needed for Chest pain up to max of 3 total doses. If no relief after 1 dose, call 911. 25 tablet 3     No current facility-administered medications for this visit.      Allergies   Allergen Reactions    Statins Other (See Comments)     Leg cramps and joint pain     Health Maintenance   Topic Date Due    Hepatitis C screen  Never done    HIV screen  Never done    DTaP/Tdap/Td vaccine (1 - Tdap) Never done    Diabetes screen  Never done    Colon cancer screen colonoscopy  Never done    Flu vaccine (1) Never done    COVID-19 Vaccine (2 - Pfizer 3-dose series) 10/21/2021    Creatinine monitoring  06/03/2022    Potassium monitoring  06/09/2022    Depression Screen  02/10/2023    Lipid screen  10/08/2026    Hepatitis A vaccine  Aged Out    Hepatitis B vaccine  Aged Out    Hib vaccine  Aged Out    Meningococcal (ACWY) vaccine  Aged Out    Pneumococcal 0-64 years Vaccine  Aged Out         Objective:     Physical Exam  Constitutional:       General: He is not in acute distress. Appearance: He is well-developed. He is not diaphoretic. HENT:      Head: Normocephalic and atraumatic. Comments: Maxillary sinus tenderness, bilateral serous effusions     Right Ear: External ear normal.      Left Ear: External ear normal.   Eyes:      Conjunctiva/sclera: Conjunctivae normal.   Neck:      Vascular: No JVD. Cardiovascular:      Rate and Rhythm: Normal rate and regular rhythm. Heart sounds: Normal heart sounds. Pulmonary:      Effort: Pulmonary effort is normal.      Breath sounds: Normal breath sounds. No wheezing or rales. Musculoskeletal:         General: No tenderness. Skin:     General: Skin is warm and dry. Coloration: Skin is not pale. Neurological:      Mental Status: He is alert and oriented to person, place, and time. /86   Pulse 82   Temp 98 °F (36.7 °C) (Oral)   Resp 16   Wt 201 lb (91.2 kg)   SpO2 98%   BMI 29.68 kg/m²       Impression/Plan:  1. Chronic maxillary sinusitis    2. Coronary artery disease involving native coronary artery of native heart with angina pectoris ([facility] [address]    3. Primary hypertension    4. Mixed hyperlipidemia      Requested Prescriptions     Signed Prescriptions Disp Refills    cefdinir (OMNICEF) 300 MG capsule 42 capsule 0     Sig: Take 1 capsule by mouth 2 times daily for 21 days     No orders of the defined types were placed in this encounter. Methylprednisolone 160 IM  Patient giveneducational materials - see patient instructions.   Discussed use, benefit, and side effects of prescribed medications. All patient questions answered. Pt voiced understanding. Reviewed health maintenance. Patient agreedwith treatment plan. Follow up as directed. **This report has been created using voice recognition software. It may contain minor errorswhich are inherent in voice recognition technology. **       Electronically signed by Luís Dotson MD on 2/13/2022 at 9:53 AM

## 2022-02-23 ENCOUNTER — PATIENT MESSAGE (OUTPATIENT)
Dept: FAMILY MEDICINE CLINIC | Age: 47
End: 2022-02-23

## 2022-02-23 DIAGNOSIS — J32.0 CHRONIC MAXILLARY SINUSITIS: Primary | ICD-10-CM

## 2022-02-23 DIAGNOSIS — Z98.890 HX OF SINUS SURGERY: ICD-10-CM

## 2022-02-23 NOTE — TELEPHONE ENCOUNTER
From: Clemencia Payton  To: Dr. Radha Amaya: 2/23/2022 10:31 AM EST  Subject: CT Scan    Good morning. I was in to see Dr. Sony Londono on February 10th for a sinus infection/pressure. I had surgery for this last summer, but it did not seem to have much effect. Dr. Sony Londono told me to contact him in two weeks if the antibiotic was not helping. Tomorrow will be two weeks, so I wanted to reach out. He said that the next step is a CT Scan. If he does order one, I would like to do it at the hospital, not an urgent care. Thanks.

## 2022-02-28 ENCOUNTER — TELEPHONE (OUTPATIENT)
Dept: CARDIOLOGY CLINIC | Age: 47
End: 2022-02-28

## 2022-02-28 NOTE — TELEPHONE ENCOUNTER
Kaylyn Wharton MD 5 minutes ago (10:39 AM)     AC      Good morning. I would like Dr. Mickie Guardado to give me his thoughts on an issue that I am having. I have to schedule a CT scan for some major sinus issues that I am having. It is causing major headaches, earaches, jaw pain, etc. The fluid draining in my throat is also causing nausea, which is amping up my acid reflux which causes chest pain. I guess I'm asking if I need to get anything else scanned while I'm already going in? I have an appointment in April with Dr. Mickie Guardado, so I thought I would ask pre-emptively. Please advise. Thank you.

## 2022-03-09 ENCOUNTER — TELEPHONE (OUTPATIENT)
Dept: FAMILY MEDICINE CLINIC | Age: 47
End: 2022-03-09

## 2022-03-09 NOTE — TELEPHONE ENCOUNTER
Per [employer], the CT Maxillofacial ordered for this patient is pending denial for a peer to peer as the clinical information does not support medical necessity for the ordered test. All available clinical has been submitted at this time. The denial reasoning has been uploaded into the media tab for you review.     Peer to peer available  # [phone]  Case# [id number]    Please advise

## 2022-03-14 ENCOUNTER — TELEPHONE (OUTPATIENT)
Dept: FAMILY MEDICINE CLINIC | Age: 47
End: 2022-03-14

## 2022-03-14 DIAGNOSIS — J32.0 CHRONIC MAXILLARY SINUSITIS: Primary | ICD-10-CM

## 2022-03-14 RX ORDER — HYDROCODONE BITARTRATE AND ACETAMINOPHEN 5; 325 MG/1; MG/1
1 TABLET ORAL EVERY 6 HOURS PRN
Qty: 28 TABLET | Refills: 0 | Status: SHIPPED | OUTPATIENT
Start: 2022-03-14 | End: 2022-04-25 | Stop reason: SDUPTHER

## 2022-03-15 NOTE — TELEPHONE ENCOUNTER
Patient stopped in stating he received a letter that his CT was denied and is asking if this is going to be appealed or what he should do next.

## 2022-03-22 ENCOUNTER — HOSPITAL ENCOUNTER (OUTPATIENT)
Dept: CT IMAGING | Age: 47
Discharge: HOME | End: 2022-03-22
Payer: COMMERCIAL

## 2022-03-22 DIAGNOSIS — J32.0 CHRONIC MAXILLARY SINUSITIS: ICD-10-CM

## 2022-03-22 DIAGNOSIS — Z98.890 HX OF SINUS SURGERY: ICD-10-CM

## 2022-03-22 PROCEDURE — 70486 CT MAXILLOFACIAL W/O DYE: CPT

## 2022-04-06 ENCOUNTER — TELEPHONE (OUTPATIENT)
Dept: CARDIOLOGY CLINIC | Age: 47
End: 2022-04-06

## 2022-04-06 DIAGNOSIS — E78.2 MIXED HYPERLIPIDEMIA: Primary | ICD-10-CM

## 2022-04-06 NOTE — TELEPHONE ENCOUNTER
Lm for [facility] to call office   Need to see if his repatha is approved and if they are filling? ?    Tere Caldwell can talk to them if they call back

## 2022-04-06 NOTE — TELEPHONE ENCOUNTER
I spoke with patient about the cost of repatha. Patient was informed by [facility] pharmacy that repatha needed a prior auth, and the patient reached out to our office to see if we could get that submitted. I went to submit a prior auth on cover my meds, but it sent me a message stating that their is already an Nora Cowboy on file for this medication that is valid until 10/13/2022, this Nora Cowboy was submitted by [facility] on 10/2021. Patient states his copay is almost $2,000 for 3 months, and it was $113 when he fist filled the medication. Patient states when Dr. Joan Rapp filled this medication it was sureclick, and when his pcp filled the medication it was pre drawn shots. I asked Milton Santos MA to help me with this, im assuming that [facility] was able to get this medication at a cheaper cost for the patient. I told the patient that we will contact him once we find out what we can do to get this medication cheaper for him.

## 2022-04-07 RX ORDER — EVOLOCUMAB 140 MG/ML
140 INJECTION, SOLUTION SUBCUTANEOUS
Qty: 6 PEN | Refills: 3 | Status: SHIPPED | OUTPATIENT
Start: 2022-04-07 | End: 2022-07-11 | Stop reason: SDUPTHER

## 2022-04-07 NOTE — TELEPHONE ENCOUNTER
Spoke with pt. He will have labs drawn tomorrow morning. Script in  [facility]. Will wait on results.

## 2022-04-07 NOTE — TELEPHONE ENCOUNTER
Shante from [employer] called stating they called patient multiple times for a refill and they were unable to leave message for patient. She states patient needs to call them at [phone]. LM for patient to call our office back. Has he had his lipids drawn recently? If October was the most recent-will need to get repeated. RX printed for signature.

## 2022-04-09 LAB
CHOLESTEROL/HDL RATIO: 2.5 (ref 1–5)
CHOLESTEROL: 173 MG/DL (ref 150–200)
HDLC SERPL-MCNC: 70 MG/DL
LDL CHOLESTEROL CALCULATED: 39 MG/DL
LDL/HDL RATIO: 0.6
TRIGL SERPL-MCNC: 320 MG/DL (ref 27–150)
VLDLC SERPL CALC-MCNC: 64 MG/DL (ref 0–30)

## 2022-04-11 NOTE — TELEPHONE ENCOUNTER
Received note from Jena Hi at [facility]. Pt has to call pharmacy to get re-established. [facility] number: [phone]    LM for pt to return call.

## 2022-04-11 NOTE — TELEPHONE ENCOUNTER
PA started thru covermymeds. [employer] is reviewing your PA request and will respond within 24 hours for Medicaid or up to 72 hours for non-Medicaid plans, based on the required timeframe determined by state or federal regulations. To check for an update later, open this request from your dashboard.

## 2022-04-13 NOTE — TELEPHONE ENCOUNTER
Received a fax from [employer] that the cover my meds PA was denied.   I called and spoke to Dominique Johnson at [employer] and he states that he received another fax that they only want to pay for 1 month at a time     Dominique Johnson is going to reach out to the pt and find out when his last fill was at [facility] aid   Then try to fill when it gets closer to his fill date

## 2022-04-18 ENCOUNTER — OFFICE VISIT (OUTPATIENT)
Dept: CARDIOLOGY CLINIC | Age: 47
End: 2022-04-18
Payer: COMMERCIAL

## 2022-04-18 VITALS
HEIGHT: 69 IN | BODY MASS INDEX: 30.07 KG/M2 | HEART RATE: 100 BPM | DIASTOLIC BLOOD PRESSURE: 71 MMHG | SYSTOLIC BLOOD PRESSURE: 98 MMHG | WEIGHT: 203 LBS

## 2022-04-18 DIAGNOSIS — I25.119 CORONARY ARTERY DISEASE INVOLVING NATIVE CORONARY ARTERY OF NATIVE HEART WITH ANGINA PECTORIS (HCC): Primary | ICD-10-CM

## 2022-04-18 PROCEDURE — 93000 ELECTROCARDIOGRAM COMPLETE: CPT | Performed by: INTERNAL MEDICINE

## 2022-04-18 PROCEDURE — G8417 CALC BMI ABV UP PARAM F/U: HCPCS | Performed by: INTERNAL MEDICINE

## 2022-04-18 PROCEDURE — G8427 DOCREV CUR MEDS BY ELIG CLIN: HCPCS | Performed by: INTERNAL MEDICINE

## 2022-04-18 PROCEDURE — 1036F TOBACCO NON-USER: CPT | Performed by: INTERNAL MEDICINE

## 2022-04-18 PROCEDURE — 99214 OFFICE O/P EST MOD 30 MIN: CPT | Performed by: INTERNAL MEDICINE

## 2022-04-18 RX ORDER — FENOFIBRATE 145 MG/1
145 TABLET, COATED ORAL DAILY
Qty: 30 TABLET | Refills: 3 | Status: SHIPPED | OUTPATIENT
Start: 2022-04-18 | End: 2022-08-01 | Stop reason: SDUPTHER

## 2022-04-18 NOTE — PATIENT INSTRUCTIONS
You may receive a survey regarding the care you received during your visit. Your input is valuable to us. We encourage you to complete and return your survey. We hope you will choose us in the future for your healthcare needs. You may receive a survey regarding the care you received during your visit. Your input is valuable to us. We encourage you to complete and return your survey. We hope you will choose us in the future for your healthcare needs.

## 2022-04-18 NOTE — PROGRESS NOTES
Follow-up 1 year. He thinks he may have any upcoming sinus surgery, nothing scheduled or ordered yet. EKG completed.

## 2022-04-18 NOTE — PROGRESS NOTES
[address] [facility] [address] [place] [address]  Dept: [phone]  Dept Fax: [phone]  Loc: [phone]    Visit Date: 4/18/2022    Mr. Joy Evangelista is a 56 y.o. male  who presented for:  No chief complaint on file. HPI:   1 year follow up appointment  50yoM hx if STENU 11/2019 s/p PCI x 2 IDANIA and LAD PCI w/ residual OM branch stenosis  On Repatha , brilinta, hyzaar and coreg. Lipid panel 4/8/22:  Cholesterol 173 (124)   (353)  HDL 70 (41)    Denies chest pain, RICE, SOB, diaphoresis, syncope, palpitations, leg swelling         Current Outpatient Medications:     Evolocumab (REPATHA SURECLICK) 903 MG/ML SOAJ, Inject 140 mg into the skin every 14 days, Disp: 6 pen, Rfl: 3    Evolocumab 140 MG/ML SOSY, Inject 1 mL into the skin every 14 days, Disp: 6 each, Rfl: 3    levothyroxine (SYNTHROID) 50 MCG tablet, take 1 tablet by mouth once daily, Disp: 90 tablet, Rfl: 3    pantoprazole (PROTONIX) 40 MG tablet, take 1 tablet by mouth every morning before breakfast, Disp: 90 tablet, Rfl: 3    losartan-hydroCHLOROthiazide (HYZAAR) 50-12.5 MG per tablet, take 1 tablet by mouth once daily, Disp: 90 tablet, Rfl: 3    Omega-3 Fatty Acids (FISH OIL) 1000 MG CAPS, Take 2,000 mg by mouth 2 times daily, Disp: , Rfl:     ticagrelor (BRILINTA) 90 MG TABS tablet, Take 1 tablet by mouth 2 times daily, Disp: 180 tablet, Rfl: 3    carvedilol (COREG) 6.25 MG tablet, Take 1 tablet by mouth 2 times daily (with meals), Disp: 180 tablet, Rfl: 3    Coenzyme Q10 (CO Q-10) 200 MG CAPS, Take by mouth daily, Disp: , Rfl:     nitroGLYCERIN (NITROSTAT) 0.4 MG SL tablet, Place 1 tablet under the tongue every 5 minutes as needed for Chest pain up to max of 3 total doses.  If no relief after 1 dose, call 911., Disp: 25 tablet, Rfl: 3    Past Medical History  Raul Brown  has a past medical history of CAD (coronary artery disease), Hyperlipidemia, Hypertension, MI, old, and Thyroid disease. Social History  SHAUN  reports that he has never smoked. He has never used smokeless tobacco. He reports current alcohol use. He reports that he does not use drugs. Family History  SHAUN family history includes Cancer in his sister; Heart Disease in his mother; High Blood Pressure in his father and mother; Stroke in his mother. There is no family history of bicuspid aortic valve, aneurysms, heart transplant, pacemakers, defibrillators, or sudden cardiac death. Past Surgical History   Past Surgical History:   Procedure Laterality Date    CORONARY ANGIOPLASTY WITH STENT PLACEMENT  2019    x3    SINUS ENDOSCOPY N/A 6/9/2021    IGS NASAL ENDOSCOPY WITH PARTIAL RESECTION [place] BULLOSA RIGHT MIDDLE TURBINATE, PARTIAL ANTERIOR ETHMOID MAXILLARY ANTROSTOMY WITH REMOVAL OF TISSUE FROM MAXILLARY SINUS RIGHT, MAXILLARY ANTROSTOMY LEFT, SEPTOPLASTY SUBMUCOUS RESECT TURBINATES PARTIAL RIGHT INFERIOR TURBINATE performed by Guille Giraldo MD at [address]         Review of Systems   Constitutional: Negative for chills and fever  HENT: Negative for congestion, sinus pressure, sneezing and sore throat. Eyes: Negative for pain, discharge, redness and itching. Respiratory: Negative for apnea, cough  Gastrointestinal: Negative for blood in stool, constipation, diarrhea   Endocrine: Negative for cold intolerance, heat intolerance, polydipsia. Genitourinary: Negative for dysuria, enuresis, flank pain and hematuria. Musculoskeletal: Negative for arthralgias, joint swelling and neck pain. Neurological: Negative for numbness and headaches. Psychiatric/Behavioral: Negative for agitation, confusion, decreased concentration and dysphoric mood. Objective: There were no vitals taken for this visit.     Wt Readings from Last 3 Encounters:   02/10/22 201 lb (91.2 kg)   07/20/21 197 lb (89.4 kg)   06/25/21 198 lb (89.8 kg)     BP Readings from Last 3 Encounters:   02/10/22 122/86   07/20/21 126/84   06/25/21 108/70       Nursing note and vitals reviewed. Physical Exam   Constitutional: Oriented to person, place, and time. Appears well-developed and well-nourished. HENT:   Head: Normocephalic and atraumatic. Eyes: EOM are normal. Pupils are equal, round, and reactive to light. Neck: Normal range of motion. Neck supple. No JVD present. Cardiovascular: Normal rate, regular rhythm, normal heart sounds and intact distal pulses. No murmur heard. Pulmonary/Chest: Effort normal and breath sounds normal. No respiratory distress. No wheezes. No rales. Abdominal: Soft. Bowel sounds are normal. No distension. There is no tenderness. Musculoskeletal: Normal range of motion. No edema. Neurological: Alert and oriented to person, place, and time. No cranial nerve deficit. Coordination normal.   Skin: Skin is warm and dry. Psychiatric: Normal mood and affect.        Lab Results   Component Value Date    CKTOTAL 114 06/16/2020    CKTOTAL 235 02/12/2020       Lab Results   Component Value Date    WBC 4.9 06/03/2021    WBC 5.9 01/02/2021    RBC 4.23 06/03/2021    HGB 13.8 06/03/2021    HCT 39.1 06/03/2021    MCV 92 06/03/2021    MCH 32.6 06/03/2021    MCHC 35.3 06/03/2021    RDW 12.6 06/03/2021     06/03/2021     01/02/2021    MPV 7.8 06/03/2021       Lab Results   Component Value Date     06/03/2021    K 3.9 06/09/2021     06/03/2021    CO2 28 06/03/2021    BUN 8 06/03/2021    LABALBU 4.6 06/03/2021    CREATININE 0.99 06/03/2021    CALCIUM 10.0 06/03/2021    LABGLOM 72 01/02/2021    GLUCOSE 106 06/03/2021       Lab Results   Component Value Date    ALKPHOS 51 06/03/2021    ALKPHOS 67 01/02/2021    ALT 43 06/03/2021    AST 43 06/03/2021    PROT 7.9 06/03/2021    BILITOT 0.5 06/03/2021    BILIDIR 0.3 02/12/2020    LABALBU 4.6 06/03/2021       Lab Results   Component Value Date    MG 1.5 01/02/2021       Lab Results   Component Value Date    INR 0.92 11/22/2019         No results found for: LABA1C    Lab Results   Component Value Date    TRIG 320 04/08/2022    HDL 70 04/08/2022    LDLCALC 39 04/08/2022    LABVLDL 64 04/08/2022       Lab Results   Component Value Date    TSH 1.44 04/21/2021         Testing Reviewed:      I have individually reviewed the cardiac test below:    ECHO: Results for orders placed during the hospital encounter of 11/22/19    ECHO Complete 2D W Doppler W Color    Narrative  Transthoracic Echocardiography Report (TTE)    Demographics    Patient Name    Christianne Obando Gender               Male    MR #            [id number]     Race                     Ethnicity    Account #       [de-identified]     Room Number          7020    Accession       [id number]     Date of Study        11/22/2019  Number    Date of Birth   1975    Referring Physician  Quincy Stevens MD    Age             38 year(s)    Henrry Stephenson RDCS    Interpreting         Elias Murphy MD  Physician    Procedure    Type of Study    TTE procedure:ECHOCARDIOGRAM COMPLETE 2D W DOPPLER W COLOR. Procedure Date  Date: 11/22/2019 Start: 01:07 PM    Study Location: Bedside  Technical Quality: Adequate visualization    Indications:STEMI and Chest pain. Additional Medical History:Hypertension, Family history of coronary artery  disease, Hyperlipidemia. Patient Status: Routine    Height: 68.9 inches Weight: 211.65 pounds BSA: 2.11 m^2 BMI: 31.35 kg/m^2    BP: 114/67 mmHg    Allergies  - No Known Allergies. Conclusions    Summary  Ejection fraction was estimated at 55-60%. There was mild mitral regurgitation. There was trace tricuspid regurgitation. Assuming RAP = 5 mmHg, the estimated RVSP = 27 mmHg. Ascending aorta = 3.5 cm.     Signature    ----------------------------------------------------------------  Electronically signed by Elias Murphy MD (Interpreting  physician) on 11/23/2019 at 12:14 PM  ----------------------------------------------------------------    Findings    Mitral Valve  The mitral valve structure was normal with normal leaflet separation. DOPPLER: The transmitral velocity was within the normal range with no  evidence for mitral stenosis. There was mild mitral regurgitation. Aortic Valve  The aortic valve was trileaflet with normal thickness and cuspal  separation. DOPPLER: Transaortic velocity was within the normal range with  no evidence of aortic stenosis. There was no evidence of aortic  regurgitation. Tricuspid Valve  The tricuspid valve structure was normal with normal leaflet separation. DOPPLER: There was no evidence of tricuspid stenosis. There was trace  tricuspid regurgitation. Assuming RAP = 5 mmHg, the estimated RVSP = 27  mmHg. Pulmonic Valve  The pulmonic valve leaflets were not well seen. DOPPLER: The transpulmonic  velocity was within the normal range with trace regurgitation. Left Atrium  Left atrial size was normal.    Left Ventricle  Normal left ventricular size and systolic function. There were no regional wall motion abnormalities. Wall thickness was within normal limits. Ejection fraction was estimated at 55-60%. Right Atrium  Right atrial size was normal.    Right Ventricle  The right ventricular size was normal with normal systolic function and  wall thickness. Pericardial Effusion  The pericardium was normal in appearance with no evidence of a pericardial  effusion. Pleural Effusion  No evidence of pleural effusion. Aorta / Great Vessels  -Ascending aorta = 3.5 cm. -IVC not well seen.     M-Mode/2D Measurements & Calculations    LV Diastolic   LV Systolic Dimension: 3  AV Cusp Separation: 2.3 cmLA  Dimension: 4.2 cm                        Dimension: 3.5 cmAO Root  cm             LV Volume Diastolic: 42.2 Dimension: 2.9 cmLA Area: 17.5  LV FS:28.6 %   ml                        cm^2  LV PW          LV Volume Systolic: 35 ml  Diastolic: 1.1 LV EDV/LV EDV Index: 78.6  cm             ml/37 m^2LV ESV/LV ESV  Septum         Index: 35 ml/17 m^2       RV Diastolic Dimension: 3.4 cm  Diastolic: 1.1 EF Calculated: 55.5 %  cm                                       LA/Aorta: 1.21  Ascending Aorta: 3.5 cm  LA volume/Index: 43.9 ml /21m^2    Doppler Measurements & Calculations    MV Peak E-Wave: 87.8 AV Peak Velocity: 118 LVOT Peak Velocity: 107 cm/s  cm/s                 cm/s                  LVOT Mean Velocity: 83.3 cm/s  MV Peak A-Wave: 75   AV Peak Gradient:     LVOT Peak Gradient: 5 mmHgLVOT  cm/s                 5.57 mmHg             Mean Gradient: 3 mmHg  MV E/A Ratio: 1.17   AV Mean Velocity:  MV Peak Gradient:    87.3 cm/s             TV Peak E-Wave: 69.4 cm/s  3.08 mmHg            AV Mean Gradient: 3   TV Peak A-Wave: 49.3 cm/s  mmHg  MV Deceleration      AV VTI: 25.2 cm       TV Peak Gradient: 1.93 mmHg  Time: 201 msec                             TR Velocity:152 cm/s  MV P1/2t: 59 msec                          TR Gradient:9.24 mmHg  MVA by PHT:3.73 cm^2 LVOT VTI: 25.7 cm     PV Peak Velocity: 60.8 cm/s  IVRT: 81 msec         PV Peak Gradient: 1.48 mmHg  MV E' Septal  Velocity: 5.7 cm/s  MV A' Septal         AV DVI (VTI): 1. 02AV  Velocity: 9 cm/s     DVI (Vmax):0.91  MV E' Lateral  Velocity: 6.6 cm/s  MV A' Lateral  Velocity: 10.3 cm/s  E/E' septal: 15.4  E/E' lateral: 13.3  MR Velocity: 425  cm/s    [email or web address]://[employer]/MDWeb? [id number]%[id number]       Assessment/Plan      {No diagnosis found. (Refresh or delete this SmartLink)}    Continue medications as necessary     Disposition:  No follow-ups on file.            Electronically signed by Mainor Lopez MD   4/18/2022 at 2:10 PM EDT

## 2022-04-19 NOTE — PROGRESS NOTES
[address] [facility] [place] [address] [place] [address]  Dept: [phone]  Dept Fax: [phone]  Loc: [phone]    Visit Date: 4/18/2022    Mr. Rashard Hodges is a 56 y.o. male  who presented for:  Chief Complaint   Patient presents with    1 Year Follow Up     CAD involving native coronary artery of native heart with angina pect     Coronary Artery Disease       HPI:   HPI   55 yo M hx of STEMI 11/2019 s/p PCI x 2 IDANIA, and then LAD PCI afterwards, with residual OM branch stenosis who presents for follow-up. No chest pain, angina, RICE, orthopnea, PND, sob at rest, palpitations, LE edema, or syncope. May need further sinus surgery. No issues with ADLS. No recent NTG SL prn use. Current Outpatient Medications:     fenofibrate (TRICOR) 145 MG tablet, Take 1 tablet by mouth daily, Disp: 30 tablet, Rfl: 3    Evolocumab (REPATHA SURECLICK) 494 MG/ML SOAJ, Inject 140 mg into the skin every 14 days, Disp: 6 pen, Rfl: 3    levothyroxine (SYNTHROID) 50 MCG tablet, take 1 tablet by mouth once daily, Disp: 90 tablet, Rfl: 3    pantoprazole (PROTONIX) 40 MG tablet, take 1 tablet by mouth every morning before breakfast, Disp: 90 tablet, Rfl: 3    losartan-hydroCHLOROthiazide (HYZAAR) 50-12.5 MG per tablet, take 1 tablet by mouth once daily, Disp: 90 tablet, Rfl: 3    Omega-3 Fatty Acids (FISH OIL) 1000 MG CAPS, Take 2,000 mg by mouth 2 times daily, Disp: , Rfl:     ticagrelor (BRILINTA) 90 MG TABS tablet, Take 1 tablet by mouth 2 times daily, Disp: 180 tablet, Rfl: 3    carvedilol (COREG) 6.25 MG tablet, Take 1 tablet by mouth 2 times daily (with meals), Disp: 180 tablet, Rfl: 3    Coenzyme Q10 (CO Q-10) 200 MG CAPS, Take by mouth daily, Disp: , Rfl:     nitroGLYCERIN (NITROSTAT) 0.4 MG SL tablet, Place 1 tablet under the tongue every 5 minutes as needed for Chest pain up to max of 3 total doses.  If no relief after 1 dose, call 911., Disp: (92.1 kg)   BMI 29.98 kg/m²     Wt Readings from Last 3 Encounters:   04/18/22 203 lb (92.1 kg)   02/10/22 201 lb (91.2 kg)   07/20/21 197 lb (89.4 kg)     BP Readings from Last 3 Encounters:   04/18/22 98/71   02/10/22 122/86   07/20/21 126/84       Nursing note and vitals reviewed. Physical Exam   Constitutional: Oriented to person, place, and time. Appears well-developed and well-nourished. HENT:   Head: Normocephalic and atraumatic. Eyes: EOM are normal. Pupils are equal, round, and reactive to light. Neck: Normal range of motion. Neck supple. No JVD present. Cardiovascular: Normal rate, regular rhythm, normal heart sounds and intact distal pulses. No murmur heard. Pulmonary/Chest: Effort normal and breath sounds normal. No respiratory distress. No wheezes. No rales. Abdominal: Soft. Bowel sounds are normal. No distension. There is no tenderness. Musculoskeletal: Normal range of motion. No edema. Neurological: Alert and oriented to person, place, and time. No cranial nerve deficit. Coordination normal.   Skin: Skin is warm and dry. Psychiatric: Normal mood and affect.        Lab Results   Component Value Date    CKTOTAL 114 06/16/2020    CKTOTAL 235 02/12/2020       Lab Results   Component Value Date    WBC 4.9 06/03/2021    WBC 5.9 01/02/2021    RBC 4.23 06/03/2021    HGB 13.8 06/03/2021    HCT 39.1 06/03/2021    MCV 92 06/03/2021    MCH 32.6 06/03/2021    MCHC 35.3 06/03/2021    RDW 12.6 06/03/2021     06/03/2021     01/02/2021    MPV 7.8 06/03/2021       Lab Results   Component Value Date     06/03/2021    K 3.9 06/09/2021     06/03/2021    CO2 28 06/03/2021    BUN 8 06/03/2021    LABALBU 4.6 06/03/2021    CREATININE 0.99 06/03/2021    CALCIUM 10.0 06/03/2021    LABGLOM 72 01/02/2021    GLUCOSE 106 06/03/2021       Lab Results   Component Value Date    ALKPHOS 51 06/03/2021    ALKPHOS 67 01/02/2021    ALT 43 06/03/2021    AST 43 06/03/2021    PROT 7.9 06/03/2021    BILITOT 0.5 06/03/2021    BILIDIR 0.3 02/12/2020    LABALBU 4.6 06/03/2021       Lab Results   Component Value Date    MG 1.5 01/02/2021       Lab Results   Component Value Date    INR 0.92 11/22/2019         No results found for: LABA1C    Lab Results   Component Value Date    TRIG 320 04/08/2022    HDL 70 04/08/2022    LDLCALC 39 04/08/2022    LABVLDL 64 04/08/2022       Lab Results   Component Value Date    TSH 1.44 04/21/2021         Testing Reviewed:      I have individually reviewed the cardiac test below:    ECHO: Results for orders placed during the hospital encounter of 11/22/19    ECHO Complete 2D W Doppler W Color    Narrative  Transthoracic Echocardiography Report (TTE)    Demographics    Patient Name    Ana Álvarez Gender               Male    MR #            [id number]     Race                     Ethnicity    Account #       [de-identified]     Room Number          9964    Accession       [id number]     Date of Study        11/22/2019  Number    Date of Birth   1975    Referring Physician  Mei Farah MD    Age             38 year(s)    Mervat Seran,  RDCS    Interpreting         Kya Frias MD  Physician    Procedure    Type of Study    TTE procedure:ECHOCARDIOGRAM COMPLETE 2D W DOPPLER W COLOR. Procedure Date  Date: 11/22/2019 Start: 01:07 PM    Study Location: Bedside  Technical Quality: Adequate visualization    Indications:STEMI and Chest pain. Additional Medical History:Hypertension, Family history of coronary artery  disease, Hyperlipidemia. Patient Status: Routine    Height: 68.9 inches Weight: 211.65 pounds BSA: 2.11 m^2 BMI: 31.35 kg/m^2    BP: 114/67 mmHg    Allergies  - No Known Allergies. Conclusions    Summary  Ejection fraction was estimated at 55-60%. There was mild mitral regurgitation. There was trace tricuspid regurgitation.   Assuming RAP = 5 mmHg, the estimated RVSP = 27 mmHg.  Ascending aorta = 3.5 cm. Signature    ----------------------------------------------------------------  Electronically signed by Jacquie Nieto MD (Interpreting  physician) on 11/23/2019 at 12:14 PM  ----------------------------------------------------------------    Findings    Mitral Valve  The mitral valve structure was normal with normal leaflet separation. DOPPLER: The transmitral velocity was within the normal range with no  evidence for mitral stenosis. There was mild mitral regurgitation. Aortic Valve  The aortic valve was trileaflet with normal thickness and cuspal  separation. DOPPLER: Transaortic velocity was within the normal range with  no evidence of aortic stenosis. There was no evidence of aortic  regurgitation. Tricuspid Valve  The tricuspid valve structure was normal with normal leaflet separation. DOPPLER: There was no evidence of tricuspid stenosis. There was trace  tricuspid regurgitation. Assuming RAP = 5 mmHg, the estimated RVSP = 27  mmHg. Pulmonic Valve  The pulmonic valve leaflets were not well seen. DOPPLER: The transpulmonic  velocity was within the normal range with trace regurgitation. Left Atrium  Left atrial size was normal.    Left Ventricle  Normal left ventricular size and systolic function. There were no regional wall motion abnormalities. Wall thickness was within normal limits. Ejection fraction was estimated at 55-60%. Right Atrium  Right atrial size was normal.    Right Ventricle  The right ventricular size was normal with normal systolic function and  wall thickness. Pericardial Effusion  The pericardium was normal in appearance with no evidence of a pericardial  effusion. Pleural Effusion  No evidence of pleural effusion. Aorta / Great Vessels  -Ascending aorta = 3.5 cm. -IVC not well seen.     M-Mode/2D Measurements & Calculations    LV Diastolic   LV Systolic Dimension: 3  AV Cusp Separation: 2.3 cmLA  Dimension: 4.2 cm Dimension: 3.5 cmAO Root  cm             LV Volume Diastolic: 38.6 Dimension: 2.9 cmLA Area: 17.5  LV FS:28.6 %   ml                        cm^2  LV PW          LV Volume Systolic: 35 ml  Diastolic: 1.1 LV EDV/LV EDV Index: 78.6  cm             ml/37 m^2LV ESV/LV ESV  Septum         Index: 35 ml/17 m^2       RV Diastolic Dimension: 3.4 cm  Diastolic: 1.1 EF Calculated: 55.5 %  cm                                       LA/Aorta: 1.21  Ascending Aorta: 3.5 cm  LA volume/Index: 43.9 ml /21m^2    Doppler Measurements & Calculations    MV Peak E-Wave: 87.8 AV Peak Velocity: 118 LVOT Peak Velocity: 107 cm/s  cm/s                 cm/s                  LVOT Mean Velocity: 83.3 cm/s  MV Peak A-Wave: 75   AV Peak Gradient:     LVOT Peak Gradient: 5 mmHgLVOT  cm/s                 5.57 mmHg             Mean Gradient: 3 mmHg  MV E/A Ratio: 1.17   AV Mean Velocity:  MV Peak Gradient:    87.3 cm/s             TV Peak E-Wave: 69.4 cm/s  3.08 mmHg            AV Mean Gradient: 3   TV Peak A-Wave: 49.3 cm/s  mmHg  MV Deceleration      AV VTI: 25.2 cm       TV Peak Gradient: 1.93 mmHg  Time: 201 msec                             TR Velocity:152 cm/s  MV P1/2t: 59 msec                          TR Gradient:9.24 mmHg  MVA by PHT:3.73 cm^2 LVOT VTI: 25.7 cm     PV Peak Velocity: 60.8 cm/s  IVRT: 81 msec         PV Peak Gradient: 1.48 mmHg  MV E' Septal  Velocity: 5.7 cm/s  MV A' Septal         AV DVI (VTI): 1. 02AV  Velocity: 9 cm/s     DVI (Vmax):0.91  MV E' Lateral  Velocity: 6.6 cm/s  MV A' Lateral  Velocity: 10.3 cm/s  E/E' septal: 15.4  E/E' lateral: 13.3  MR Velocity: 425  cm/s    [email or web address] [id number]       Assessment/Plan   STEMI 11/2019 s/p PCI x 2 IDANIA, and then LAD PCI afterwards, with residual OM branch stenosis   Statin-induced myalgias  Preserved EF  HyperTG  Doing well, but TG has not improved.   Add Fenofibrate, work on diet/exercise. The patient was advised on risk/benefits of the new Rx and he agreed to proceed with the medication(s). Continue Repatha. LDL is well controlled. Recheck FLP 3 months. No NTG SL prn use. Continue DAPT. If needs sinus surgery, may proceed, no further work-up necessary. Discussed diet/exercise/BP/weight loss/health lifestyle choices/lipids; the patient understands the goals and will try to comply.     Disposition:  1 year         Electronically signed by Danielle Roberson MD   4/18/2022 at 9:52 PM EDT

## 2022-04-25 DIAGNOSIS — J32.0 CHRONIC MAXILLARY SINUSITIS: ICD-10-CM

## 2022-04-25 RX ORDER — HYDROCODONE BITARTRATE AND ACETAMINOPHEN 5; 325 MG/1; MG/1
1 TABLET ORAL EVERY 6 HOURS PRN
Qty: 28 TABLET | Refills: 0 | Status: SHIPPED | OUTPATIENT
Start: 2022-04-25 | End: 2022-05-27 | Stop reason: SDUPTHER

## 2022-05-06 ENCOUNTER — OFFICE VISIT (OUTPATIENT)
Dept: ENT CLINIC | Age: 47
End: 2022-05-06
Payer: COMMERCIAL

## 2022-05-06 VITALS
DIASTOLIC BLOOD PRESSURE: 78 MMHG | SYSTOLIC BLOOD PRESSURE: 118 MMHG | WEIGHT: 199 LBS | HEIGHT: 69 IN | OXYGEN SATURATION: 97 % | BODY MASS INDEX: 29.47 KG/M2 | HEART RATE: 94 BPM | TEMPERATURE: 98.8 F

## 2022-05-06 DIAGNOSIS — K04.7 DENTAL INFECTION: Primary | ICD-10-CM

## 2022-05-06 DIAGNOSIS — R51.9 FACIAL PAIN: ICD-10-CM

## 2022-05-06 DIAGNOSIS — J34.3 HYPERTROPHY OF INFERIOR NASAL TURBINATE: ICD-10-CM

## 2022-05-06 DIAGNOSIS — H92.01 REFERRED OTALGIA OF RIGHT EAR: ICD-10-CM

## 2022-05-06 DIAGNOSIS — J33.8 MAXILLARY SINUS POLYP: ICD-10-CM

## 2022-05-06 PROCEDURE — G8427 DOCREV CUR MEDS BY ELIG CLIN: HCPCS | Performed by: OTOLARYNGOLOGY

## 2022-05-06 PROCEDURE — 1036F TOBACCO NON-USER: CPT | Performed by: OTOLARYNGOLOGY

## 2022-05-06 PROCEDURE — G8417 CALC BMI ABV UP PARAM F/U: HCPCS | Performed by: OTOLARYNGOLOGY

## 2022-05-06 PROCEDURE — 99214 OFFICE O/P EST MOD 30 MIN: CPT | Performed by: OTOLARYNGOLOGY

## 2022-05-06 RX ORDER — AMOXICILLIN AND CLAVULANATE POTASSIUM 875; 125 MG/1; MG/1
1 TABLET, FILM COATED ORAL 2 TIMES DAILY
Qty: 42 TABLET | Refills: 0 | Status: SHIPPED | OUTPATIENT
Start: 2022-05-06 | End: 2022-05-27

## 2022-05-06 ASSESSMENT — ENCOUNTER SYMPTOMS
VOICE CHANGE: 0
DIARRHEA: 0
VOMITING: 0
WHEEZING: 0
CHOKING: 0
COUGH: 0
TROUBLE SWALLOWING: 1
SORE THROAT: 0
FACIAL SWELLING: 1
RHINORRHEA: 0
CHEST TIGHTNESS: 0
COLOR CHANGE: 0
ABDOMINAL PAIN: 0
NAUSEA: 0
SINUS PRESSURE: 1
APNEA: 0
SHORTNESS OF BREATH: 0
STRIDOR: 0

## 2022-05-06 NOTE — LETTER
[address] and Throat  [place] [address] [facility]  Phone: [phone]  Fax: [phone]    Reddy Licona MD        May 22, 2022    Aylin Anton MD  [address] [place] [address]    Patient: Deuce Patel   MR Number: [id number]   YOB: 1975   Date of Visit: 5/6/2022     Dear Aylin Anton,    I recently saw your patient, Deuce Patel, regarding right facial pain. I have sent you the entire note or you could review it from 5/6/2022 in his chart. The polypoid mass in the right maxillary sinuses reactive over the periapical infection surrounding the right first maxillary molar (#3). If you review my note you will see the lucent area around the bone. I have made sure that there is a thorough discussion within my office note. Another words the pain is from the dental infection. Once that is treated by the dentist or an oral surgeon, I would be happy to correct any residual ENT issues. He canceled his last follow-up appointment, by the way. Below are the relevant portions of my assessment and plan of care. Assessment & Plan   Diagnoses and all orders for this visit:     Diagnosis Orders   1. Dental infection, bone loss surrounding painful right first maxillary molar (tooth #3)  amoxicillin-clavulanate (AUGMENTIN) 875-125 MG per tablet   2. Referred otalgia of right ear  amoxicillin-clavulanate (AUGMENTIN) 875-125 MG per tablet   3. Facial pain  amoxicillin-clavulanate (AUGMENTIN) 875-125 MG per tablet   4. Maxillary sinus polyp  amoxicillin-clavulanate (AUGMENTIN) 875-125 MG per tablet   5. Hypertrophy of right inferior nasal turbinate         The findings were explained and his questions were answered. Correlation of the history and physical findings with the CAT scan makes it a virtual certainty that the pain is coming from that tooth.   Soft tissue density or polyp in the the right maxillary sinus appears to be reactionary to the tooth mentioned. Follow-up options were discussed including patient should talk with his dentist about treatment. The patient will call to book with me and possibly schedule a procedure after he's seen the dentist Dr. Olivia Borjas at [facility]. (Phone number is [phone])   Patient will call radiology to get the URL and passwords so that the dentist can access the CTs. Once the dental infection is controlled, if the right inferior turbinate remains large and was not reactive, partial submucosal ablation could be performed. At the same time we could once again remove some of the polypoid mass. This would be a minimal procedure compared to his first.  Since he had an excellent postop result when I last saw him, I suspect that the current turbinate enlargement might be reactive. He agreed. If you have questions, please do not hesitate to call me. I look forward to following Nash Jimenez along with you.     Sincerely,      Kelly Rose MD

## 2022-05-06 NOTE — PROGRESS NOTES
[facility] [place] SPECIALTY  [facility] EAR, NOSE AND THROAT  [facility]  Dept: [phone]  Dept Fax: [phone]  Loc: [phone]    Marina Garcia is a 56 y.o. male who was referred byNo ref. provider found for:  Chief Complaint   Patient presents with    Follow-up     Patient is here for follow up. He c/o cyst right side. He feels pressure, ear pain, and headaches. Rylee Turcios HPI:     Marina Garcia is a 56 y.o. male who presents today for right side nasal cyst  Feels pressure, ear pain and headaches. Last seen 7/20/21. He canceled his last appointment with me for further follow-up in September. Postoperatively, we were treating a MSSA infection. Recent CT reviewed with patient. Septoplasty, partial submucous resection of right inferior turbinate; partial resection franck bullosa, right middle turbinate; partial right anterior endoscopic ethmoidectomy; right maxillary antrostomy with removal of tissue; left maxillary antrostomy; on 6/9/21. CT Sinus:  1. 2.4 x 1.2 cm retention cyst or polyp in the right maxillary sinus. The left maxillary sinus is clear. 2. Postoperative changes involving the ethmoid air cells bilaterally. Mild mucosal thickening in the ethmoid air cells. 3. The remaining paranasal sinuses are clear. 4. Swollen right inferior turbinate.           **This report has been created using voice recognition software. It may contain minor errors which are inherent in voice recognition technology. **       Final report electronically signed by DR Enzo Solomon on 3/22/2022       Study shows polyp or cyst of floor of right maxillary sinus related to dental issue on maxillary floor. First Maxillary molar. Lucency around tooth. Missing bone. More detailed analysis and image below    States not doing well, sinus pressure, cyst is pretty good size. Having facial,head and ear constant pain. Trying to use the Afrin spray.   Uses it 5-6 times a year only on the right side. Only used it for a couple weeks then stopped using it. Pressure in teeth. Was having it last year and had it x-rayed and they told him it was fine. Has pain up into his maxilla. . Didn't have a root canal.  He's not sleeping ok. It's keeping him away some nights. Can breathe ok through nose. Feels stuffy but blows and it's clear. Feeling like there is wind in ears. Has a broken tooth on the left side. Had antibiotics. Had a nosebleed while in [place] last year. Prior to that didn't have a nosebleed for 15 years. I reviewed the images in relation to the physical findings below. There is a thin area of lucent bone around maxillary tooth #3, the first right maxillary molar. That also is exactly where he points when he says it hurts. That would also explain the referred pain to the side of his head. A cyst or polyp in the maxillary sinus like this without associated mucosal inflammation is essentially painless. This is most likely polypoid tissue in relation to the dental infection. Might not be enough bone loss to show up on a bitewing x-ray. History:      Allergies   Allergen Reactions    Statins Other (See Comments)     Leg cramps and joint pain     Current Outpatient Medications   Medication Sig Dispense Refill    amoxicillin-clavulanate (AUGMENTIN) 875-125 MG per tablet Take 1 tablet by mouth 2 times daily for 21 days 42 tablet 0    fenofibrate (TRICOR) 145 MG tablet Take 1 tablet by mouth daily 30 tablet 3    Evolocumab (REPATHA SURECLICK) 585 MG/ML SOAJ Inject 140 mg into the skin every 14 days 6 pen 3    levothyroxine (SYNTHROID) 50 MCG tablet take 1 tablet by mouth once daily 90 tablet 3    pantoprazole (PROTONIX) 40 MG tablet take 1 tablet by mouth every morning before breakfast 90 tablet 3    losartan-hydroCHLOROthiazide (HYZAAR) 50-12.5 MG per tablet take 1 tablet by mouth once daily 90 tablet 3    Omega-3 Fatty Acids (FISH OIL) 1000 MG CAPS Take 2,000 mg by mouth 2 times daily      ticagrelor (BRILINTA) 90 MG TABS tablet Take 1 tablet by mouth 2 times daily 180 tablet 3    carvedilol (COREG) 6.25 MG tablet Take 1 tablet by mouth 2 times daily (with meals) 180 tablet 3    Coenzyme Q10 (CO Q-10) 200 MG CAPS Take by mouth daily      nitroGLYCERIN (NITROSTAT) 0.4 MG SL tablet Place 1 tablet under the tongue every 5 minutes as needed for Chest pain up to max of 3 total doses. If no relief after 1 dose, call 911. 25 tablet 3     No current facility-administered medications for this visit. Past Medical History:   Diagnosis Date    CAD (coronary artery disease)     Hyperlipidemia     Hypertension     MI, old 2019    Thyroid disease       Past Surgical History:   Procedure Laterality Date    CORONARY ANGIOPLASTY WITH STENT PLACEMENT  2019    x3    SINUS ENDOSCOPY N/A 6/9/2021    IGS NASAL ENDOSCOPY WITH PARTIAL RESECTION B[place] BULLOSA RIGHT MIDDLE TURBINATE, PARTIAL ANTERIOR ETHMOID MAXILLARY ANTROSTOMY WITH REMOVAL OF TISSUE FROM MAXILLARY SINUS RIGHT, MAXILLARY ANTROSTOMY LEFT, SEPTOPLASTY SUBMUCOUS RESECT TURBINATES PARTIAL RIGHT INFERIOR TURBINATE performed by Corbin Ma MD at [address] [place] [address]       Family History   Problem Relation Age of Onset    Heart Disease Mother     High Blood Pressure Mother     Stroke Mother     High Blood Pressure Father     Cancer Sister     Diabetes Neg Hx      Social History     Tobacco Use    Smoking status: Never Smoker    Smokeless tobacco: Never Used   Substance Use Topics    Alcohol use: Yes     Comment: few drinks a few days       Subjective:       Review of Systems   Constitutional: Negative for activity change, appetite change, chills, diaphoresis, fatigue, fever and unexpected weight change. HENT: Positive for congestion, ear pain, facial swelling, sinus pressure, tinnitus and trouble swallowing.  Negative for dental problem, ear discharge, hearing loss, mouth sores, nosebleeds, postnasal drip, rhinorrhea, sneezing, sore throat and voice change. Eyes: Negative for visual disturbance. Respiratory: Negative for apnea, cough, choking, chest tightness, shortness of breath, wheezing and stridor. Cardiovascular: Negative for chest pain, palpitations and leg swelling. Gastrointestinal: Negative for abdominal pain, diarrhea, nausea and vomiting. Endocrine: Negative for cold intolerance, heat intolerance, polydipsia and polyuria. Genitourinary: Negative for dysuria, enuresis and hematuria. Musculoskeletal: Negative for arthralgias, gait problem, neck pain and neck stiffness. Skin: Negative for color change and rash. Allergic/Immunologic: Negative for environmental allergies, food allergies and immunocompromised state. Neurological: Positive for headaches. Negative for dizziness, syncope, facial asymmetry, speech difficulty and light-headedness. Hematological: Negative for adenopathy. Does not bruise/bleed easily. Psychiatric/Behavioral: Positive for sleep disturbance. Negative for confusion. The patient is not nervous/anxious. Objective:     /78 (Site: Right Upper Arm, Position: Sitting)   Pulse 94   Temp 98.8 °F (37.1 °C) (Infrared)   Ht 5' 9\" (1.753 m)   Wt 199 lb (90.3 kg)   SpO2 97%   BMI 29.39 kg/m²     Physical Exam  Nose: Right inferior turbinate is enlarged. That is the side of the dental infection however. Mouth: Tooth #3 painful to percussion. Teeth on either side are not. Data:  All of the past medical history, past surgical history, family history,social history, allergies and current medications were reviewed with the patient. Assessment & Plan   Diagnoses and all orders for this visit:     Diagnosis Orders   1. Dental infection, bone loss surrounding painful right first maxillary molar (tooth #3)  amoxicillin-clavulanate (AUGMENTIN) 875-125 MG per tablet   2.  Referred otalgia of right ear amoxicillin-clavulanate (AUGMENTIN) 875-125 MG per tablet   3. Facial pain  amoxicillin-clavulanate (AUGMENTIN) 875-125 MG per tablet   4. Maxillary sinus polyp  amoxicillin-clavulanate (AUGMENTIN) 875-125 MG per tablet   5. Hypertrophy of right inferior nasal turbinate         The findings were explained and his questions were answered. Correlation of the history and physical findings with the CAT scan makes it a virtual certainty that the pain is coming from that tooth. Soft tissue density or polyp in the the right maxillary sinus appears to be reactionary to the tooth mentioned. Follow-up options were discussed including patient should talk with his dentist about treatment. The patient will call to book with me and possibly schedule a procedure after he's seen the dentist Dr. Meño Prado at [facility]. (Phone number is [phone])   Patient will call radiology to get the URL and passwords so that the dentist can access the CTs. Once the dental infection is controlled, if the right inferior turbinate remains large and was not reactive, partial submucosal ablation could be performed. At the same time we could once again remove some of the polypoid mass. This would be a minimal procedure compared to his first.  Since he had an excellent postop result when I last saw him, I suspect that the current turbinate enlargement might be reactive. He agreed. I, Joshua Nj CMA ([facility]), am scribing for, and in the presence of Dr. Ignacia Reyes. Electronically signed by Osman Munroe CMA ([facility]) on 5/6/22 at 1:28 PM EDT. (Please note that portions of this note were completed with a voice recognition program. Efforts were made to edit the dictations butoccasionally words are mis-transcribed.)    I agree to the above documentation placed by my scribe. I have personally evaluated this patient. Additional findings are as noted.   I reviewed the scribe's note and agree with the documented findings and plan of care. Any areas of disagreement are corrected. I agree with the chief complaint, past medical history, past surgical history, allergies, medications, social and family history as documented unless otherwise noted below.      Electronically signed by Jermaine Bangura MD on 5/22/2022 at 6:44 PM

## 2022-05-06 NOTE — PATIENT INSTRUCTIONS
Call Main radiology at [facility] regarding obtaining a URL and passwords to access the images on a PC (for your dentist)

## 2022-05-27 DIAGNOSIS — J32.0 CHRONIC MAXILLARY SINUSITIS: ICD-10-CM

## 2022-05-27 DIAGNOSIS — K08.89 PAIN, DENTAL: Primary | ICD-10-CM

## 2022-05-27 RX ORDER — HYDROCODONE BITARTRATE AND ACETAMINOPHEN 5; 325 MG/1; MG/1
1 TABLET ORAL EVERY 6 HOURS PRN
Qty: 28 TABLET | Refills: 0 | Status: SHIPPED | OUTPATIENT
Start: 2022-05-27 | End: 2022-06-03

## 2022-07-11 RX ORDER — EVOLOCUMAB 140 MG/ML
140 INJECTION, SOLUTION SUBCUTANEOUS
Qty: 6 PEN | Refills: 3 | Status: SHIPPED | OUTPATIENT
Start: 2022-07-11

## 2022-07-19 RX ORDER — AMOXICILLIN AND CLAVULANATE POTASSIUM 875; 125 MG/1; MG/1
1 TABLET, FILM COATED ORAL 2 TIMES DAILY
Qty: 20 TABLET | Refills: 0 | Status: SHIPPED | OUTPATIENT
Start: 2022-07-19 | End: 2022-07-29

## 2022-08-01 RX ORDER — FENOFIBRATE 145 MG/1
145 TABLET, COATED ORAL DAILY
Qty: 90 TABLET | Refills: 3 | Status: SHIPPED | OUTPATIENT
Start: 2022-08-01

## 2022-10-07 LAB
CHOLESTEROL/HDL RATIO: 2.3 RATIO
CHOLESTEROL: 145 MG/DL
HDLC SERPL-MCNC: 63 MG/DL
LDL CHOLESTEROL CALCULATED: 60 MG/DL
LDL/HDL RATIO: 1 RATIO
TRIGL SERPL-MCNC: 109 MG/DL
VLDLC SERPL CALC-MCNC: 22 MG/DL

## 2022-10-10 ENCOUNTER — TELEPHONE (OUTPATIENT)
Dept: CARDIOLOGY CLINIC | Age: 47
End: 2022-10-10

## 2022-10-10 NOTE — TELEPHONE ENCOUNTER
----- Message from Idania Russ MD sent at 10/8/2022  3:36 PM EDT -----  Refer to allele endocrine for TG management  ----- Message -----  From: Brice Canchola In [facility]  Sent: 10/7/2022  12:38 AM EDT  To: Idania Russ MD    Relt from LIPID     Lm for pt to call office

## 2022-10-13 ENCOUNTER — TELEPHONE (OUTPATIENT)
Dept: CARDIOLOGY CLINIC | Age: 47
End: 2022-10-13

## 2022-10-13 ENCOUNTER — OFFICE VISIT (OUTPATIENT)
Dept: CARDIOLOGY CLINIC | Age: 47
End: 2022-10-13
Payer: COMMERCIAL

## 2022-10-13 VITALS
WEIGHT: 208 LBS | HEART RATE: 76 BPM | SYSTOLIC BLOOD PRESSURE: 130 MMHG | DIASTOLIC BLOOD PRESSURE: 82 MMHG | HEIGHT: 70 IN | BODY MASS INDEX: 29.78 KG/M2

## 2022-10-13 DIAGNOSIS — I10 ESSENTIAL HYPERTENSION: ICD-10-CM

## 2022-10-13 DIAGNOSIS — E78.2 MIXED HYPERLIPIDEMIA: ICD-10-CM

## 2022-10-13 DIAGNOSIS — Z86.79 HX OF CORONARY ARTERY DISEASE: Primary | ICD-10-CM

## 2022-10-13 DIAGNOSIS — J34.1 MAXILLARY SINUS CYST: Primary | ICD-10-CM

## 2022-10-13 PROCEDURE — G8427 DOCREV CUR MEDS BY ELIG CLIN: HCPCS | Performed by: INTERNAL MEDICINE

## 2022-10-13 PROCEDURE — G8484 FLU IMMUNIZE NO ADMIN: HCPCS | Performed by: INTERNAL MEDICINE

## 2022-10-13 PROCEDURE — 99213 OFFICE O/P EST LOW 20 MIN: CPT | Performed by: INTERNAL MEDICINE

## 2022-10-13 PROCEDURE — G8417 CALC BMI ABV UP PARAM F/U: HCPCS | Performed by: INTERNAL MEDICINE

## 2022-10-13 PROCEDURE — 1036F TOBACCO NON-USER: CPT | Performed by: INTERNAL MEDICINE

## 2022-10-13 NOTE — PROGRESS NOTES
[id number] [facility] [place] [address] [facility] [address]  Dept: [phone]  Dept Fax: [phone]  Loc: [phone]    Visit Date: 10/13/2022    Mr. Stefania Rodriguez is a 56 y.o. male  who presented for:  Chief Complaint   Patient presents with    6 Month Follow-Up    Coronary Artery Disease       HPI:   HPI   55 yo M hx of STEMI 11/2019 s/p PCI x 2 IDANIA, and then LAD PCI afterwards, with residual OM branch stenosis who presents for follow-up. No chest pain, angina, RICE, orthopnea, PND, sob at rest, palpitations, LE edema, or syncope. Current Outpatient Medications:     ticagrelor (BRILINTA) 90 MG TABS tablet, Take 1 tablet by mouth in the morning and 1 tablet before bedtime. , Disp: 180 tablet, Rfl: 0    fenofibrate (TRICOR) 145 MG tablet, Take 1 tablet by mouth in the morning., Disp: 90 tablet, Rfl: 3    Evolocumab (REPATHA SURECLICK) 969 MG/ML SOAJ, Inject 140 mg into the skin every 14 days, Disp: 6 pen, Rfl: 3    levothyroxine (SYNTHROID) 50 MCG tablet, take 1 tablet by mouth once daily, Disp: 90 tablet, Rfl: 3    pantoprazole (PROTONIX) 40 MG tablet, take 1 tablet by mouth every morning before breakfast, Disp: 90 tablet, Rfl: 3    losartan-hydroCHLOROthiazide (HYZAAR) 50-12.5 MG per tablet, take 1 tablet by mouth once daily, Disp: 90 tablet, Rfl: 3    Omega-3 Fatty Acids (FISH OIL) 1000 MG CAPS, Take 2,000 mg by mouth 2 times daily, Disp: , Rfl:     carvedilol (COREG) 6.25 MG tablet, Take 1 tablet by mouth 2 times daily (with meals), Disp: 180 tablet, Rfl: 3    Coenzyme Q10 (CO Q-10) 200 MG CAPS, Take by mouth daily, Disp: , Rfl:     nitroGLYCERIN (NITROSTAT) 0.4 MG SL tablet, Place 1 tablet under the tongue every 5 minutes as needed for Chest pain up to max of 3 total doses. If no relief after 1 dose, call 911.  (Patient not taking: Reported on 10/13/2022), Disp: 25 tablet, Rfl: 3    Past Medical History  Emily Waldron  has a past medical history of CAD (coronary artery disease), Hyperlipidemia, Hypertension, MI, old, and Thyroid disease. Social History  Chuy Mendoza  reports that he has never smoked. He has never used smokeless tobacco. He reports current alcohol use. He reports that he does not use drugs. Family History  Chuy Mendoza family history includes Cancer in his sister; Heart Disease in his mother; High Blood Pressure in his father and mother; Stroke in his mother. There is no family history of bicuspid aortic valve, aneurysms, heart transplant, pacemakers, defibrillators, or sudden cardiac death. Past Surgical History   Past Surgical History:   Procedure Laterality Date    CORONARY ANGIOPLASTY WITH STENT PLACEMENT  2019    x3    SINUS ENDOSCOPY N/A 6/9/2021    IGS NASAL ENDOSCOPY WITH PARTIAL RESECTION BARRINGTON BULLOSA RIGHT MIDDLE TURBINATE, PARTIAL ANTERIOR ETHMOID MAXILLARY ANTROSTOMY WITH REMOVAL OF TISSUE FROM MAXILLARY SINUS RIGHT, MAXILLARY ANTROSTOMY LEFT, SEPTOPLASTY SUBMUCOUS RESECT TURBINATES PARTIAL RIGHT INFERIOR TURBINATE performed by Hannah Fitzpatrick MD at [facility]         Review of Systems   Constitutional: Negative for chills and fever  HENT: Negative for congestion, sinus pressure, sneezing and sore throat. Eyes: Negative for pain, discharge, redness and itching. Respiratory: Negative for apnea, cough  Gastrointestinal: Negative for blood in stool, constipation, diarrhea   Endocrine: Negative for cold intolerance, heat intolerance, polydipsia. Genitourinary: Negative for dysuria, enuresis, flank pain and hematuria. Musculoskeletal: Negative for arthralgias, joint swelling and neck pain. Neurological: Negative for numbness and headaches. Psychiatric/Behavioral: Negative for agitation, confusion, decreased concentration and dysphoric mood.      Objective:     /82   Pulse 76   Ht 5' 10\" (1.778 m)   Wt 208 lb (94.3 kg)   BMI 29.84 kg/m²     Wt Readings from Last 3 Encounters:   10/13/22 208 lb (94.3 kg)   05/06/22 199 lb (90.3 kg)   04/18/22 203 lb (92.1 kg)     BP Readings from Last 3 Encounters:   10/13/22 130/82   05/06/22 118/78   04/18/22 98/71       Nursing note and vitals reviewed. Physical Exam   Constitutional: Oriented to person, place, and time. Appears well-developed and well-nourished. HENT:   Head: Normocephalic and atraumatic. Eyes: EOM are normal. Pupils are equal, round, and reactive to light. Neck: Normal range of motion. Neck supple. No JVD present. Cardiovascular: Normal rate, regular rhythm, normal heart sounds and intact distal pulses. No murmur heard. Pulmonary/Chest: Effort normal and breath sounds normal. No respiratory distress. No wheezes. No rales. Abdominal: Soft. Bowel sounds are normal. No distension. There is no tenderness. Musculoskeletal: Normal range of motion. No edema. Neurological: Alert and oriented to person, place, and time. No cranial nerve deficit. Coordination normal.   Skin: Skin is warm and dry. Psychiatric: Normal mood and affect.        Lab Results   Component Value Date/Time    CKTOTAL 114 06/16/2020 09:25 AM    CKTOTAL 235 02/12/2020 11:30 AM       Lab Results   Component Value Date/Time    WBC 4.9 06/03/2021 10:40 AM    WBC 5.9 01/02/2021 07:15 PM    RBC 4.23 06/03/2021 10:40 AM    HGB 13.8 06/03/2021 10:40 AM    HCT 39.1 06/03/2021 10:40 AM    MCV 92 06/03/2021 10:40 AM    MCH 32.6 06/03/2021 10:40 AM    MCHC 35.3 06/03/2021 10:40 AM    RDW 12.6 06/03/2021 10:40 AM     06/03/2021 10:40 AM     01/02/2021 07:15 PM    MPV 7.8 06/03/2021 10:40 AM       Lab Results   Component Value Date/Time     06/03/2021 10:40 AM    K 3.9 06/09/2021 11:30 AM     06/03/2021 10:40 AM    CO2 28 06/03/2021 10:40 AM    BUN 8 06/03/2021 10:40 AM    LABALBU 4.6 06/03/2021 10:40 AM    CREATININE 0.99 06/03/2021 10:40 AM    CALCIUM 10.0 06/03/2021 10:40 AM    LABGLOM 72 01/02/2021 07:15 PM    GLUCOSE 106 06/03/2021 10:40 AM       Lab Results   Component Value Date/Time    ALKPHOS 51 06/03/2021 10:40 AM    ALKPHOS 67 01/02/2021 07:15 PM    ALT 43 06/03/2021 10:40 AM    AST 43 06/03/2021 10:40 AM    PROT 7.9 06/03/2021 10:40 AM    BILITOT 0.5 06/03/2021 10:40 AM    BILIDIR 0.3 02/12/2020 11:30 AM    LABALBU 4.6 06/03/2021 10:40 AM       Lab Results   Component Value Date/Time    MG 1.5 01/02/2021 07:15 PM       Lab Results   Component Value Date    INR 0.92 11/22/2019         No results found for: LABA1C    Lab Results   Component Value Date/Time    TRIG 109 10/06/2022 11:35 AM    HDL 63 10/06/2022 11:35 AM    LDLCALC 60 10/06/2022 11:35 AM    LABVLDL 22 10/06/2022 11:35 AM       Lab Results   Component Value Date/Time    TSH 1.44 04/21/2021 11:15 AM         Testing Reviewed:      I have individually reviewed the cardiac test below:    ECHO: No results found for this or any previous visit. Assessment/Plan   STEMI 11/2019 s/p PCI x 2 IDANIA, and then LAD PCI afterwards, with residual OM branch stenosis   Statin-induced myalgias  Preserved EF  HyperTG  Right maxillary sinus retention cyst - 2.4 x 1.2 cm  Doing well from cardiac issues. No major issues. BP and HR well controlled. DAPT, no bleeding. He is Repatha/FishOil. FLP has improved. Discussed diet/exercise/BP/weight loss/health lifestyle choices/lipids; the patient understands the goals and will try to comply.     Disposition:  1 year           Electronically signed by Quincy Stoner MD   10/13/2022 at 4:05 PM EDT

## 2022-10-17 NOTE — TELEPHONE ENCOUNTER
I called and talked to patient and he is aware of Dr. Tiki Baca recommendations. Patient would like to see Dr. Tiki Baca. Referral placed and given to scheduling.

## 2022-11-03 DIAGNOSIS — I10 ESSENTIAL HYPERTENSION: ICD-10-CM

## 2022-11-03 RX ORDER — CARVEDILOL 6.25 MG/1
TABLET ORAL
Qty: 180 TABLET | Refills: 0 | Status: SHIPPED | OUTPATIENT
Start: 2022-11-03

## 2022-11-03 RX ORDER — TICAGRELOR 90 MG/1
TABLET ORAL
Qty: 180 TABLET | Refills: 3 | Status: SHIPPED | OUTPATIENT
Start: 2022-11-03

## 2022-11-03 RX ORDER — LOSARTAN POTASSIUM AND HYDROCHLOROTHIAZIDE 12.5; 5 MG/1; MG/1
TABLET ORAL
Qty: 90 TABLET | Refills: 0 | Status: SHIPPED | OUTPATIENT
Start: 2022-11-03

## 2022-12-19 RX ORDER — PANTOPRAZOLE SODIUM 40 MG/1
TABLET, DELAYED RELEASE ORAL
Qty: 90 TABLET | Refills: 3 | Status: SHIPPED | OUTPATIENT
Start: 2022-12-19

## 2022-12-30 RX ORDER — EVOLOCUMAB 140 MG/ML
140 INJECTION, SOLUTION SUBCUTANEOUS
Qty: 6 ADJUSTABLE DOSE PRE-FILLED PEN SYRINGE | Refills: 3 | Status: SHIPPED | OUTPATIENT
Start: 2022-12-30

## 2023-01-05 ENCOUNTER — TELEPHONE (OUTPATIENT)
Dept: CARDIOLOGY CLINIC | Age: 48
End: 2023-01-05

## 2023-01-05 NOTE — TELEPHONE ENCOUNTER
----- Message from Vivian Paez sent at 1/5/2023  2:38 PM EST -----  Regarding: Repatha Refill  Also, can you let Dr. Thalia Stockton know that Dr. Ricky Glynn' office just called and cancelled my appointment that I had scheduled for January 17th because he picked up a surgery. They said the next available time was not until at least April. So, I will not be using Dr. Ricky Glynn. I have waited two months to get in and now they have cancelled and I cannot wait 3-4 more months. Thanks.

## 2023-01-05 NOTE — TELEPHONE ENCOUNTER
FYI patient is getting a new referral from PCP. Patient wanted Dr. Mello Thomason to be aware he is not seeing Dr. Aj Landis office.

## 2023-01-10 DIAGNOSIS — J32.0 CHRONIC MAXILLARY SINUSITIS: Primary | ICD-10-CM

## 2023-01-23 ENCOUNTER — PATIENT MESSAGE (OUTPATIENT)
Dept: FAMILY MEDICINE CLINIC | Age: 48
End: 2023-01-23

## 2023-01-23 DIAGNOSIS — J34.1 MAXILLARY SINUS CYST: ICD-10-CM

## 2023-01-23 DIAGNOSIS — Z98.890 HX OF SINUS SURGERY: ICD-10-CM

## 2023-01-23 DIAGNOSIS — J32.0 CHRONIC MAXILLARY SINUSITIS: Primary | ICD-10-CM

## 2023-01-23 NOTE — TELEPHONE ENCOUNTER
From: Yoly Conde  To: Dr. Acevedo Sake: 1/23/2023 3:09 PM EST  Subject: ENT    Dr. Winnie Starkey,    I had an appointment scheduled for 1/17/2023 with Dr. Aj Landis for my sinuses/cyst in my right cheek. I waited 3 months to get into see him as I want a second opinion on my sinus issues and the potential issue with the cyst. He ended up scheduling a surgery over my appointment and his  said that I could not get in until mid-April. I have held off long enough and would like a referral to somewhere that will take care of whatever is going on with my sinuses/headaches, etc. Please advise. Thank you.

## 2023-02-01 DIAGNOSIS — Z00.00 WELLNESS EXAMINATION: ICD-10-CM

## 2023-02-01 DIAGNOSIS — E78.2 MIXED HYPERLIPIDEMIA: Primary | ICD-10-CM

## 2023-02-01 DIAGNOSIS — I10 ESSENTIAL HYPERTENSION: ICD-10-CM

## 2023-02-01 RX ORDER — LOSARTAN POTASSIUM AND HYDROCHLOROTHIAZIDE 12.5; 5 MG/1; MG/1
TABLET ORAL
Qty: 90 TABLET | Refills: 0 | OUTPATIENT
Start: 2023-02-01

## 2023-02-01 RX ORDER — CARVEDILOL 6.25 MG/1
TABLET ORAL
Qty: 180 TABLET | Refills: 0 | OUTPATIENT
Start: 2023-02-01

## 2023-02-01 NOTE — TELEPHONE ENCOUNTER
----- Message from Carlaat 143 sent at 2/1/2023 11:29 AM EST -----  Subject: Referral Request    Reason for referral request? Patient would like to have his blood work   orders done prior to upcoming appt. Please send lab order to [facility]. Provider patient wants to be referred to(if known):     Provider Phone Number(if known):     Additional Information for Provider?   ---------------------------------------------------------------------------  --------------  [id number]    [phone]; OK to leave message on voicemail  ---------------------------------------------------------------------------  --------------

## 2023-02-01 NOTE — TELEPHONE ENCOUNTER
Ordered- LM for patient to clarify what lab he needs it sent to in [place]. If it is [facility] it will be in the system.

## 2023-02-13 SDOH — ECONOMIC STABILITY: HOUSING INSECURITY
IN THE LAST 12 MONTHS, WAS THERE A TIME WHEN YOU DID NOT HAVE A STEADY PLACE TO SLEEP OR SLEPT IN A SHELTER (INCLUDING NOW)?: NO

## 2023-02-13 SDOH — ECONOMIC STABILITY: FOOD INSECURITY: WITHIN THE PAST 12 MONTHS, YOU WORRIED THAT YOUR FOOD WOULD RUN OUT BEFORE YOU GOT MONEY TO BUY MORE.: NEVER TRUE

## 2023-02-13 SDOH — ECONOMIC STABILITY: TRANSPORTATION INSECURITY
IN THE PAST 12 MONTHS, HAS LACK OF TRANSPORTATION KEPT YOU FROM MEETINGS, WORK, OR FROM GETTING THINGS NEEDED FOR DAILY LIVING?: NO

## 2023-02-13 SDOH — ECONOMIC STABILITY: INCOME INSECURITY: HOW HARD IS IT FOR YOU TO PAY FOR THE VERY BASICS LIKE FOOD, HOUSING, MEDICAL CARE, AND HEATING?: NOT VERY HARD

## 2023-02-13 SDOH — ECONOMIC STABILITY: FOOD INSECURITY: WITHIN THE PAST 12 MONTHS, THE FOOD YOU BOUGHT JUST DIDN'T LAST AND YOU DIDN'T HAVE MONEY TO GET MORE.: NEVER TRUE

## 2023-02-14 LAB
ABSOLUTE BASO #: 0.04 K/UL (ref 0–0.2)
ABSOLUTE EOS #: 0.02 K/UL (ref 0–0.5)
ABSOLUTE LYMPH #: 0.67 K/UL (ref 1–4)
ABSOLUTE MONO #: 0.6 K/UL (ref 0.2–1)
ABSOLUTE NEUT #: 3.53 K/UL (ref 1.5–7.5)
BASOPHILS RELATIVE PERCENT: 0.8 %
EOSINOPHILS RELATIVE PERCENT: 0.4 %
HCT VFR BLD CALC: 44.1 % (ref 40–51)
HEMOGLOBIN: 15.6 G/DL (ref 13.5–17)
LYMPHOCYTE %: 13.8 %
MCH RBC QN AUTO: 30.8 PG (ref 25–33)
MCHC RBC AUTO-ENTMCNC: 35.4 G/DL (ref 31–36)
MCV RBC AUTO: 87.2 FL (ref 80–99)
MONOCYTES # BLD: 12.3 %
NEUTROPHILS RELATIVE PERCENT: 72.5 %
PDW BLD-RTO: 12.9 % (ref 11.5–15)
PLATELETS: 313 K/UL (ref 130–400)
PMV BLD AUTO: 10.3 FL (ref 9.3–13)
RBC: 5.06 M/UL (ref 4.5–6.1)
WBC: 4.9 K/UL (ref 3.5–11)

## 2023-02-15 LAB
ALBUMIN SERPL-MCNC: 5 G/DL (ref 3.5–5.2)
ALK PHOSPHATASE: 55 U/L (ref 40–118)
ALT SERPL-CCNC: 28 U/L (ref 5–50)
ANION GAP SERPL CALCULATED.3IONS-SCNC: 13 MEQ/L (ref 7–16)
AST SERPL-CCNC: 26 U/L (ref 9–50)
BILIRUB SERPL-MCNC: 0.8 MG/DL
BUN BLDV-MCNC: 14 MG/DL (ref 6–20)
CALCIUM SERPL-MCNC: 10.7 MG/DL (ref 8.5–10.5)
CHLORIDE BLD-SCNC: 97 MEQ/L (ref 95–107)
CHOLESTEROL/HDL RATIO: 2.3 RATIO
CHOLESTEROL: 169 MG/DL
CO2: 26 MEQ/L (ref 19–31)
CREAT SERPL-MCNC: 1.37 MG/DL (ref 0.8–1.4)
EGFR IF NONAFRICAN AMERICAN: 64 ML/MIN/1.73
GLUCOSE: 112 MG/DL (ref 70–99)
HDLC SERPL-MCNC: 73 MG/DL
LDL CHOLESTEROL CALCULATED: 72 MG/DL
LDL/HDL RATIO: 1 RATIO
POTASSIUM SERPL-SCNC: 4.4 MEQ/L (ref 3.5–5.4)
SODIUM BLD-SCNC: 136 MEQ/L (ref 133–146)
T4 FREE: 1.43 NG/DL (ref 0.8–1.9)
TOTAL PROTEIN: 8 G/DL (ref 6.1–8.3)
TRIGL SERPL-MCNC: 122 MG/DL
TSH SERPL DL<=0.05 MIU/L-ACNC: 1.81 UIU/ML (ref 0.4–4.1)
VLDLC SERPL CALC-MCNC: 24 MG/DL

## 2023-02-16 ENCOUNTER — OFFICE VISIT (OUTPATIENT)
Dept: FAMILY MEDICINE CLINIC | Age: 48
End: 2023-02-16
Payer: COMMERCIAL

## 2023-02-16 VITALS
HEART RATE: 85 BPM | WEIGHT: 202.9 LBS | DIASTOLIC BLOOD PRESSURE: 64 MMHG | SYSTOLIC BLOOD PRESSURE: 122 MMHG | RESPIRATION RATE: 16 BRPM | TEMPERATURE: 97.8 F | OXYGEN SATURATION: 97 % | BODY MASS INDEX: 30.05 KG/M2 | HEIGHT: 69 IN

## 2023-02-16 DIAGNOSIS — I10 ESSENTIAL HYPERTENSION: ICD-10-CM

## 2023-02-16 DIAGNOSIS — R79.89 ELEVATED TSH: ICD-10-CM

## 2023-02-16 DIAGNOSIS — Z12.11 SCREEN FOR COLON CANCER: ICD-10-CM

## 2023-02-16 DIAGNOSIS — Z00.00 ROUTINE GENERAL MEDICAL EXAMINATION AT A HEALTH CARE FACILITY: Primary | ICD-10-CM

## 2023-02-16 PROCEDURE — 3078F DIAST BP <80 MM HG: CPT | Performed by: FAMILY MEDICINE

## 2023-02-16 PROCEDURE — 3074F SYST BP LT 130 MM HG: CPT | Performed by: FAMILY MEDICINE

## 2023-02-16 PROCEDURE — 99396 PREV VISIT EST AGE 40-64: CPT | Performed by: FAMILY MEDICINE

## 2023-02-16 PROCEDURE — G8484 FLU IMMUNIZE NO ADMIN: HCPCS | Performed by: FAMILY MEDICINE

## 2023-02-16 RX ORDER — LEVOTHYROXINE SODIUM 0.05 MG/1
TABLET ORAL
Qty: 90 TABLET | Refills: 3 | Status: SHIPPED | OUTPATIENT
Start: 2023-02-16

## 2023-02-16 RX ORDER — LOSARTAN POTASSIUM AND HYDROCHLOROTHIAZIDE 12.5; 5 MG/1; MG/1
TABLET ORAL
Qty: 90 TABLET | Refills: 3 | Status: SHIPPED | OUTPATIENT
Start: 2023-02-16

## 2023-02-16 ASSESSMENT — PATIENT HEALTH QUESTIONNAIRE - PHQ9
1. LITTLE INTEREST OR PLEASURE IN DOING THINGS: 0
SUM OF ALL RESPONSES TO PHQ9 QUESTIONS 1 & 2: 0
SUM OF ALL RESPONSES TO PHQ QUESTIONS 1-9: 0
2. FEELING DOWN, DEPRESSED OR HOPELESS: 0

## 2023-02-19 ASSESSMENT — ENCOUNTER SYMPTOMS
SHORTNESS OF BREATH: 0
ABDOMINAL DISTENTION: 0
CONSTIPATION: 0
EYE PAIN: 0
SINUS PRESSURE: 0
DIARRHEA: 0
COUGH: 0
RHINORRHEA: 0
SORE THROAT: 0
NAUSEA: 0
ABDOMINAL PAIN: 0

## 2023-02-19 NOTE — PROGRESS NOTES
[address] [place] [address]  Dept: [phone]  Dept Fax: [phone]  Loc: [phone]  PROGRESS NOTE      VisitDate: 2/16/2023    Negrito Nicole is a 56 y.o. male who presents today for:     Chief Complaint   Patient presents with    Annual Exam     CAD, HTN, med refill, discuss labs, declines colonoscopy until age 49         Subjective:  HPI  Patient comes in for annual wellness. History of heart disease stable. History hypertension stable and well-controlled. Labs reviewed with the patient. History of hypothyroidism stable    Review of Systems   Constitutional:  Negative for appetite change and fever. HENT:  Negative for congestion, ear pain, postnasal drip, rhinorrhea, sinus pressure and sore throat. Eyes:  Negative for pain and visual disturbance. Respiratory:  Negative for cough and shortness of breath. Cardiovascular:  Negative for chest pain. Gastrointestinal:  Negative for abdominal distention, abdominal pain, constipation, diarrhea and nausea. Genitourinary:  Negative for dysuria, frequency and urgency. Musculoskeletal:  Negative for arthralgias. Skin:  Negative for rash. Neurological:  Negative for dizziness.    Past Medical History:   Diagnosis Date    Anxiety     CAD (coronary artery disease)     Hyperlipidemia     Hypertension     MI, old 2019    Thyroid disease       Past Surgical History:   Procedure Laterality Date    CORONARY ANGIOPLASTY WITH STENT PLACEMENT  2019    x3    PTCA      SINUS ENDOSCOPY N/A 06/09/2021    IGS NASAL ENDOSCOPY WITH PARTIAL RESECTION B[place] BULLOSA RIGHT MIDDLE TURBINATE, PARTIAL ANTERIOR ETHMOID MAXILLARY ANTROSTOMY WITH REMOVAL OF TISSUE FROM MAXILLARY SINUS RIGHT, MAXILLARY ANTROSTOMY LEFT, SEPTOPLASTY SUBMUCOUS RESECT TURBINATES PARTIAL RIGHT INFERIOR TURBINATE performed by Devora Licona MD at [facility]       Family History   Problem Relation Age of Onset    Heart Disease Mother     High Blood Pressure Mother     Stroke Mother     Coronary Art Dis Mother     High Cholesterol Mother     High Blood Pressure Father     Cancer Sister     Arthritis Sister     Diabetes Neg Hx      Social History     Tobacco Use    Smoking status: Never    Smokeless tobacco: Never   Substance Use Topics    Alcohol use: Yes     Alcohol/week: 10.0 standard drinks     Types: 5 Cans of beer, 5 Drinks containing 0.5 oz of alcohol per week     Comment: few drinks a few days      Current Outpatient Medications   Medication Sig Dispense Refill    losartan-hydroCHLOROthiazide (HYZAAR) 50-12.5 MG per tablet take 1 tablet by mouth once daily 90 tablet 3    levothyroxine (SYNTHROID) 50 MCG tablet take 1 tablet by mouth once daily 90 tablet 3    Evolocumab (REPATHA SURECLICK) 492 MG/ML SOAJ Inject 140 mg into the skin every 14 days 6 Adjustable Dose Pre-filled Pen Syringe 3    pantoprazole (PROTONIX) 40 MG tablet take 1 tablet by mouth every morning before breakfast 90 tablet 3    carvedilol (COREG) 6.25 MG tablet take 1 tablet by mouth twice a day with meals 180 tablet 0    BRILINTA 90 MG TABS tablet take 1 tablet by mouth every morning and 1 tablet at bedtime 180 tablet 3    fenofibrate (TRICOR) 145 MG tablet Take 1 tablet by mouth in the morning. 90 tablet 3    Omega-3 Fatty Acids (FISH OIL) 1000 MG CAPS Take 2,000 mg by mouth 2 times daily      Coenzyme Q10 (CO Q-10) 200 MG CAPS Take by mouth daily      nitroGLYCERIN (NITROSTAT) 0.4 MG SL tablet Place 1 tablet under the tongue every 5 minutes as needed for Chest pain up to max of 3 total doses. If no relief after 1 dose, call 911. 25 tablet 3     No current facility-administered medications for this visit.      Allergies   Allergen Reactions    Statins Other (See Comments)     Leg cramps and joint pain     Health Maintenance   Topic Date Due    HIV screen  Never done    Hepatitis C screen  Never done    DTaP/Tdap/Td vaccine (1 - Tdap) Never done    Diabetes screen  Never done    COVID-19 Vaccine (2 - Pfizer series) 10/21/2021    Flu vaccine (1) Never done    Depression Screen  02/16/2024    Colorectal Cancer Screen  03/31/2027    Lipids  02/14/2028    Hepatitis A vaccine  Aged Out    Hib vaccine  Aged Out    Meningococcal (ACWY) vaccine  Aged Out    Pneumococcal 0-64 years Vaccine  Aged Out         Objective:     Physical Exam  Constitutional:       General: He is not in acute distress. Appearance: He is well-developed. He is not diaphoretic. HENT:      Head: Normocephalic and atraumatic. Right Ear: External ear normal.      Left Ear: External ear normal.   Eyes:      Conjunctiva/sclera: Conjunctivae normal.   Neck:      Vascular: No JVD. Cardiovascular:      Rate and Rhythm: Normal rate and regular rhythm. Heart sounds: Normal heart sounds. Pulmonary:      Effort: Pulmonary effort is normal.      Breath sounds: Normal breath sounds. No wheezing or rales. Musculoskeletal:         General: No tenderness. Skin:     General: Skin is warm and dry. Coloration: Skin is not pale. Neurological:      Mental Status: He is alert and oriented to person, place, and time. /64 (Site: Left Upper Arm)   Pulse 85   Temp 97.8 °F (36.6 °C) (Oral)   Resp 16   Ht 5' 9\" (1.753 m)   Wt 202 lb 14.4 oz (92 kg)   SpO2 97%   BMI 29.96 kg/m²       Impression/Plan:  1. Routine general medical examination at a health care facility    2. Essential hypertension    3.  Elevated TSH    4. Screen for colon cancer      Requested Prescriptions     Signed Prescriptions Disp Refills    losartan-hydroCHLOROthiazide (HYZAAR) 50-12.5 MG per tablet 90 tablet 3     Sig: take 1 tablet by mouth once daily    levothyroxine (SYNTHROID) 50 MCG tablet 90 tablet 3     Sig: take 1 tablet by mouth once daily     Orders Placed This Encounter   Procedures    JARRED - Barbi Cruz MD, Gastroenterology, [address] [facility]     Referral Priority:   Routine Referral Type:   Eval and Treat     Referral Reason:   Specialty Services Required     Referred to Provider:   Mandie Nino MD     Requested Specialty:   Gastroenterology     Number of Visits Requested:   1     Seen today for wellness visit. Discussed the importance of a healthy life style. Balanced diet, nutrition, physical activity,and injury prevention. Also discussed the importance of up to date immunizations and annual screenings. Counseled on colorectal cancer screening  Patient giveneducational materials - see patient instructions. Discussed use, benefit, and side effects of prescribed medications. All patient questions answered. Pt voiced understanding. Reviewed health maintenance. Patient agreedwith treatment plan. Follow up as directed. **This report has been created using voice recognition software. It may contain minor errorswhich are inherent in voice recognition technology. **       Electronically signed by Tony Quintana MD on 2/19/2023 at 11:42 AM

## 2023-03-10 ENCOUNTER — TELEPHONE (OUTPATIENT)
Dept: CARDIOLOGY CLINIC | Age: 48
End: 2023-03-10

## 2023-03-10 NOTE — TELEPHONE ENCOUNTER
Pt last seen by Dr. Sera Junior 10-13-22. Pt is needing clearance for dental extractions with Dr. Harley Francois. Pt on Brilinta.     Fax- [phone]

## 2023-06-04 RX ORDER — CARVEDILOL 6.25 MG/1
6.25 TABLET ORAL 2 TIMES DAILY WITH MEALS
Qty: 180 TABLET | Refills: 3 | Status: SHIPPED | OUTPATIENT
Start: 2023-06-04

## 2023-06-04 RX ORDER — PANTOPRAZOLE SODIUM 40 MG/1
40 TABLET, DELAYED RELEASE ORAL
Qty: 90 TABLET | Refills: 3 | Status: SHIPPED | OUTPATIENT
Start: 2023-06-04

## 2023-06-15 NOTE — TELEPHONE ENCOUNTER
Home medication reviewed with patient verbally Patient is requesting a second opinion from Dr. Steven Lozano. Can you please review chart and if we need to refer let us know.

## 2023-06-21 RX ORDER — EVOLOCUMAB 140 MG/ML
140 INJECTION, SOLUTION SUBCUTANEOUS
Qty: 6 ADJUSTABLE DOSE PRE-FILLED PEN SYRINGE | Refills: 3 | Status: SHIPPED | OUTPATIENT
Start: 2023-06-21

## 2023-08-14 RX ORDER — FENOFIBRATE 145 MG/1
145 TABLET, COATED ORAL DAILY
Qty: 90 TABLET | Refills: 0 | Status: SHIPPED | OUTPATIENT
Start: 2023-08-14

## 2023-09-15 NOTE — ED NOTES
Patient had surgery on 9/7/23 and has contacted her surgeon with concerns   he did call in an anti bi for her but will not call anything in for nausea  Surgeon thinks it is not related to surgery but due to fibromyalgia. Would you consider calling in something for her nausea or is there something over the counter she can use?    Please advise    She uses [facility] / Katherin Perez Upon first contact with patient this RN receives bedside shift report from [facility]. Pt resting on cot with SO at bedside. Pt respiraitons unlabored. Pt asking for ginger ale at this time.         Gianni GARCIA RN  01/02/21 [id number]

## 2023-10-05 DIAGNOSIS — E03.9 HYPOTHYROIDISM, UNSPECIFIED TYPE: ICD-10-CM

## 2023-10-05 DIAGNOSIS — E78.2 MIXED HYPERLIPIDEMIA: ICD-10-CM

## 2023-10-05 DIAGNOSIS — I10 ESSENTIAL HYPERTENSION: Primary | ICD-10-CM

## 2023-10-11 LAB
ABSOLUTE BASO #: 0.05 K/UL (ref 0–0.2)
ABSOLUTE EOS #: 0.07 K/UL (ref 0–0.5)
ABSOLUTE LYMPH #: 0.9 K/UL (ref 1–4)
ABSOLUTE MONO #: 0.59 K/UL (ref 0.2–1)
ABSOLUTE NEUT #: 3.54 K/UL (ref 1.5–7.5)
BASOPHILS RELATIVE PERCENT: 1 %
EOSINOPHILS RELATIVE PERCENT: 1.4 %
HCT VFR BLD CALC: 44 % (ref 40–51)
HEMOGLOBIN: 15.4 G/DL (ref 13.5–17)
LYMPHOCYTE %: 17.4 %
MCH RBC QN AUTO: 30.9 PG (ref 25–33)
MCHC RBC AUTO-ENTMCNC: 35 G/DL (ref 31–36)
MCV RBC AUTO: 88.2 FL (ref 80–99)
MONOCYTES # BLD: 11.4 %
NEUTROPHILS RELATIVE PERCENT: 68.6 %
PDW BLD-RTO: 12.3 % (ref 11.5–15)
PLATELETS: 347 K/UL (ref 130–400)
PMV BLD AUTO: 10.5 FL (ref 9.3–13)
RBC: 4.99 M/UL (ref 4.5–6.1)
WBC: 5.2 K/UL (ref 3.5–11)

## 2023-10-12 LAB
ALBUMIN SERPL-MCNC: 5.2 G/DL (ref 3.5–5.2)
ALK PHOSPHATASE: 50 U/L (ref 40–118)
ALT SERPL-CCNC: 24 U/L (ref 5–50)
ANION GAP SERPL CALCULATED.3IONS-SCNC: 10 MEQ/L (ref 7–16)
AST SERPL-CCNC: 24 U/L (ref 9–50)
BILIRUB SERPL-MCNC: 0.7 MG/DL
BUN BLDV-MCNC: 17 MG/DL (ref 6–20)
CALCIUM SERPL-MCNC: 11.2 MG/DL (ref 8.5–10.5)
CHLORIDE BLD-SCNC: 100 MEQ/L (ref 95–107)
CHOLESTEROL/HDL RATIO: 3.3 RATIO
CHOLESTEROL: 197 MG/DL
CO2: 27 MEQ/L (ref 19–31)
CREAT SERPL-MCNC: 1.38 MG/DL (ref 0.8–1.4)
EGFR IF NONAFRICAN AMERICAN: 63 ML/MIN/1.73
GLUCOSE: 94 MG/DL (ref 70–99)
HDLC SERPL-MCNC: 60 MG/DL
LDL CHOLESTEROL CALCULATED: 102 MG/DL
LDL/HDL RATIO: 1.7 RATIO
POTASSIUM SERPL-SCNC: 4.9 MEQ/L (ref 3.5–5.4)
SODIUM BLD-SCNC: 137 MEQ/L (ref 133–146)
T4 FREE: 1.1 NG/DL (ref 0.8–1.9)
TOTAL PROTEIN: 8.1 G/DL (ref 6.1–8.3)
TRIGL SERPL-MCNC: 173 MG/DL
TSH SERPL DL<=0.05 MIU/L-ACNC: 1.71 UIU/ML (ref 0.4–4.1)
VLDLC SERPL CALC-MCNC: 35 MG/DL

## 2023-10-17 ENCOUNTER — OFFICE VISIT (OUTPATIENT)
Dept: CARDIOLOGY CLINIC | Age: 48
End: 2023-10-17
Payer: COMMERCIAL

## 2023-10-17 VITALS
WEIGHT: 206.6 LBS | DIASTOLIC BLOOD PRESSURE: 91 MMHG | SYSTOLIC BLOOD PRESSURE: 128 MMHG | BODY MASS INDEX: 30.6 KG/M2 | HEIGHT: 69 IN | HEART RATE: 94 BPM

## 2023-10-17 DIAGNOSIS — K21.9 GASTROESOPHAGEAL REFLUX DISEASE, UNSPECIFIED WHETHER ESOPHAGITIS PRESENT: ICD-10-CM

## 2023-10-17 DIAGNOSIS — I25.10 CORONARY ARTERY DISEASE INVOLVING NATIVE CORONARY ARTERY OF NATIVE HEART WITHOUT ANGINA PECTORIS: ICD-10-CM

## 2023-10-17 DIAGNOSIS — E78.2 MIXED HYPERLIPIDEMIA: ICD-10-CM

## 2023-10-17 DIAGNOSIS — I25.10 CAD S/P PERCUTANEOUS CORONARY ANGIOPLASTY: Primary | ICD-10-CM

## 2023-10-17 DIAGNOSIS — Z98.61 CAD S/P PERCUTANEOUS CORONARY ANGIOPLASTY: Primary | ICD-10-CM

## 2023-10-17 DIAGNOSIS — I10 PRIMARY HYPERTENSION: ICD-10-CM

## 2023-10-17 PROCEDURE — 3074F SYST BP LT 130 MM HG: CPT | Performed by: NURSE PRACTITIONER

## 2023-10-17 PROCEDURE — 3078F DIAST BP <80 MM HG: CPT | Performed by: NURSE PRACTITIONER

## 2023-10-17 PROCEDURE — G8484 FLU IMMUNIZE NO ADMIN: HCPCS | Performed by: NURSE PRACTITIONER

## 2023-10-17 PROCEDURE — G8417 CALC BMI ABV UP PARAM F/U: HCPCS | Performed by: NURSE PRACTITIONER

## 2023-10-17 PROCEDURE — 99214 OFFICE O/P EST MOD 30 MIN: CPT | Performed by: NURSE PRACTITIONER

## 2023-10-17 PROCEDURE — G8427 DOCREV CUR MEDS BY ELIG CLIN: HCPCS | Performed by: NURSE PRACTITIONER

## 2023-10-17 PROCEDURE — 1036F TOBACCO NON-USER: CPT | Performed by: NURSE PRACTITIONER

## 2023-10-17 PROCEDURE — 93000 ELECTROCARDIOGRAM COMPLETE: CPT | Performed by: NURSE PRACTITIONER

## 2023-10-17 RX ORDER — PANTOPRAZOLE SODIUM 40 MG/1
40 TABLET, DELAYED RELEASE ORAL 2 TIMES DAILY
Qty: 90 TABLET | Refills: 3
Start: 2023-10-17 | End: 2023-10-27 | Stop reason: SDUPTHER

## 2023-10-17 NOTE — PROGRESS NOTES
1 year follow up. EKG completed today in office. Patient reports come chest discomfort at times but states Dr. Marily Shine and him discussed acid reflux. Denies any dizziness/lightheadedness. Denies any swelling in bilateral lower extremities.
TSH 1.710 10/11/2023 11:50 AM         Testing Reviewed:      I have individually reviewed the cardiac test below:  EKG: 10/17/2023  SR 94/min    EK2022  Sinus rhythm 100/min    ECHO: 2019  Indications:STEMI and Chest pain. Additional Medical History:Hypertension, Family history of coronary artery  disease, Hyperlipidemia. Summary   Ejection fraction was estimated at 55-60%. There was mild mitral regurgitation. There was trace tricuspid regurgitation. Assuming RAP = 5 mmHg, the estimated RVSP = 27 mmHg. Ascending aorta = 3.5 cm. Signature    ----------------------------------------------------------------   Electronically signed by Corazon Bruno MD (Interpreting   physician) on 2019 at 12:14 PM     Cath: 2019  INDICATION:  Recurrent anginal symptoms post RCA STEMI with known severe LAD disease. SUMMARY:  Successful PCI of the LAD with one drug-eluting stent. Corazon Bruno MD  D: 2019     Assessment/Plan   STEMI 2019 s/p PCI x 2 IDANIA, and then LAD PCI afterwards, with residual OM branch stenosis   Statin-induced myalgias  Preserved EF  HyperTG  Right maxillary sinus retention cyst - 2.4 x 1.2 cm    Some atypical chest sx; different from prior chest discomfort - seems GI related. Lot of stress with job and busy family life at this time. No evidence of decompensated HF, euvolemic on exam. Tolerating meds, no bleeding. No time for regular exercise - life will \"settle down\" in ~ 1 month - has plan with spouse to begin regular exercise program at that time. Discussed diet/exercise/BP/weight loss/health lifestyle choices/lipids; the patient understands the goals and will try to comply. Try increasing the Protonix to twice daily. Monitor your symptoms - if this seems to help - continue Protonix twice a day at least for the next month. Then can opt to return to once a day.    In 4  - 6 weeks send me a message to let me know how you are doing - or sooner if symptoms

## 2023-10-17 NOTE — PATIENT INSTRUCTIONS
Try increasing the Protonix to twice daily. Monitor your symptoms - if this seems to help - continue Protonix twice a day at least for the next month. Then can opt to return to once a day. In 4  - 6 weeks send me a message to let me know how you are doing - or sooner if symptoms worsening. If pressure-like feeling in chest continues or worsens please let us know. May try Nitro for this discomfort. Continue current medications as prescribed. Stay as active as you can - ease into a regular exercise routine. Eat heart healthy diet. Follow-up with your PCP as scheduled. Follow-up with Dr. Aracely Casillas or Pili DAVIS in 1 year as scheduled or sooner if need.

## 2023-10-27 DIAGNOSIS — K21.9 GASTROESOPHAGEAL REFLUX DISEASE, UNSPECIFIED WHETHER ESOPHAGITIS PRESENT: ICD-10-CM

## 2023-10-30 RX ORDER — PANTOPRAZOLE SODIUM 40 MG/1
40 TABLET, DELAYED RELEASE ORAL 2 TIMES DAILY
Qty: 90 TABLET | Refills: 3 | Status: SHIPPED | OUTPATIENT
Start: 2023-10-30

## 2023-11-13 RX ORDER — FENOFIBRATE 145 MG/1
145 TABLET, COATED ORAL DAILY
Qty: 90 TABLET | Refills: 3 | Status: SHIPPED | OUTPATIENT
Start: 2023-11-13

## 2023-11-27 DIAGNOSIS — K21.9 GASTROESOPHAGEAL REFLUX DISEASE, UNSPECIFIED WHETHER ESOPHAGITIS PRESENT: ICD-10-CM

## 2023-11-28 RX ORDER — PANTOPRAZOLE SODIUM 40 MG/1
40 TABLET, DELAYED RELEASE ORAL 2 TIMES DAILY
Qty: 90 TABLET | Refills: 3 | Status: SHIPPED | OUTPATIENT
Start: 2023-11-28

## 2023-11-28 RX ORDER — EVOLOCUMAB 140 MG/ML
140 INJECTION, SOLUTION SUBCUTANEOUS
Qty: 6 ADJUSTABLE DOSE PRE-FILLED PEN SYRINGE | Refills: 3 | Status: SHIPPED | OUTPATIENT
Start: 2023-11-28

## 2023-12-26 RX ORDER — AMOXICILLIN AND CLAVULANATE POTASSIUM 875; 125 MG/1; MG/1
1 TABLET, FILM COATED ORAL 2 TIMES DAILY
Qty: 20 TABLET | Refills: 0 | Status: SHIPPED | OUTPATIENT
Start: 2023-12-26 | End: 2024-01-05

## 2024-01-18 ENCOUNTER — OFFICE VISIT (OUTPATIENT)
Dept: FAMILY MEDICINE CLINIC | Age: 49
End: 2024-01-18
Payer: COMMERCIAL

## 2024-01-18 VITALS
OXYGEN SATURATION: 98 % | BODY MASS INDEX: 30.51 KG/M2 | HEART RATE: 91 BPM | SYSTOLIC BLOOD PRESSURE: 128 MMHG | WEIGHT: 206 LBS | RESPIRATION RATE: 16 BRPM | DIASTOLIC BLOOD PRESSURE: 92 MMHG | HEIGHT: 69 IN

## 2024-01-18 DIAGNOSIS — J40 SINOBRONCHITIS: Primary | ICD-10-CM

## 2024-01-18 DIAGNOSIS — J32.9 SINOBRONCHITIS: Primary | ICD-10-CM

## 2024-01-18 DIAGNOSIS — G47.00 INSOMNIA, UNSPECIFIED TYPE: ICD-10-CM

## 2024-01-18 PROCEDURE — G8484 FLU IMMUNIZE NO ADMIN: HCPCS | Performed by: FAMILY MEDICINE

## 2024-01-18 PROCEDURE — 3080F DIAST BP >= 90 MM HG: CPT | Performed by: FAMILY MEDICINE

## 2024-01-18 PROCEDURE — 1036F TOBACCO NON-USER: CPT | Performed by: FAMILY MEDICINE

## 2024-01-18 PROCEDURE — G8417 CALC BMI ABV UP PARAM F/U: HCPCS | Performed by: FAMILY MEDICINE

## 2024-01-18 PROCEDURE — 99213 OFFICE O/P EST LOW 20 MIN: CPT | Performed by: FAMILY MEDICINE

## 2024-01-18 PROCEDURE — G8427 DOCREV CUR MEDS BY ELIG CLIN: HCPCS | Performed by: FAMILY MEDICINE

## 2024-01-18 PROCEDURE — 3074F SYST BP LT 130 MM HG: CPT | Performed by: FAMILY MEDICINE

## 2024-01-18 PROCEDURE — 96372 THER/PROPH/DIAG INJ SC/IM: CPT | Performed by: FAMILY MEDICINE

## 2024-01-18 RX ORDER — ZOLPIDEM TARTRATE 10 MG/1
10 TABLET ORAL NIGHTLY PRN
Qty: 30 TABLET | Refills: 2 | Status: SHIPPED | OUTPATIENT
Start: 2024-01-18 | End: 2024-04-17

## 2024-01-18 RX ORDER — METHYLPREDNISOLONE ACETATE 80 MG/ML
160 INJECTION, SUSPENSION INTRA-ARTICULAR; INTRALESIONAL; INTRAMUSCULAR; SOFT TISSUE ONCE
Status: COMPLETED | OUTPATIENT
Start: 2024-01-18 | End: 2024-01-18

## 2024-01-18 RX ORDER — AZITHROMYCIN 500 MG/1
500 TABLET, FILM COATED ORAL DAILY
Qty: 3 TABLET | Refills: 0 | Status: SHIPPED | OUTPATIENT
Start: 2024-01-18 | End: 2024-01-21

## 2024-01-18 RX ADMIN — METHYLPREDNISOLONE ACETATE 160 MG: 80 INJECTION, SUSPENSION INTRA-ARTICULAR; INTRALESIONAL; INTRAMUSCULAR; SOFT TISSUE at 14:48

## 2024-01-18 NOTE — PROGRESS NOTES
Administrations This Visit       methylPREDNISolone acetate (DEPO-MEDROL) injection 160 mg       Admin Date  01/18/2024  14:48 Action  Given Dose  160 mg Route  IntraMUSCular Site  Deltoid Left Administered By  Karin Lovett RN    Ordering Provider: Ford Trevizo MD    NDC: 9581-4328-32    Lot#: [id number]    : [employer] [place]    Patient Supplied?: No

## 2024-01-19 ENCOUNTER — OFFICE VISIT (OUTPATIENT)
Dept: CARDIOLOGY CLINIC | Age: 49
End: 2024-01-19
Payer: COMMERCIAL

## 2024-01-19 ENCOUNTER — HOSPITAL ENCOUNTER (OUTPATIENT)
Age: 49
Discharge: HOME | End: 2024-01-19
Payer: COMMERCIAL

## 2024-01-19 ENCOUNTER — HOSPITAL ENCOUNTER (OUTPATIENT)
Dept: GENERAL RADIOLOGY | Age: 49
Discharge: HOME | End: 2024-01-19
Payer: COMMERCIAL

## 2024-01-19 VITALS
HEIGHT: 69 IN | DIASTOLIC BLOOD PRESSURE: 93 MMHG | SYSTOLIC BLOOD PRESSURE: 138 MMHG | WEIGHT: 203.5 LBS | BODY MASS INDEX: 30.14 KG/M2

## 2024-01-19 DIAGNOSIS — R07.9 CHEST PAIN IN ADULT: Primary | ICD-10-CM

## 2024-01-19 DIAGNOSIS — I25.10 CAD S/P PERCUTANEOUS CORONARY ANGIOPLASTY: ICD-10-CM

## 2024-01-19 DIAGNOSIS — K21.9 GASTROESOPHAGEAL REFLUX DISEASE, UNSPECIFIED WHETHER ESOPHAGITIS PRESENT: ICD-10-CM

## 2024-01-19 DIAGNOSIS — R05.3 PERSISTENT COUGH FOR 3 WEEKS OR LONGER: ICD-10-CM

## 2024-01-19 DIAGNOSIS — R07.9 CHEST PAIN IN ADULT: ICD-10-CM

## 2024-01-19 DIAGNOSIS — Z98.61 CAD S/P PERCUTANEOUS CORONARY ANGIOPLASTY: ICD-10-CM

## 2024-01-19 DIAGNOSIS — I10 ESSENTIAL HYPERTENSION: ICD-10-CM

## 2024-01-19 PROCEDURE — 99214 OFFICE O/P EST MOD 30 MIN: CPT | Performed by: NURSE PRACTITIONER

## 2024-01-19 PROCEDURE — 3075F SYST BP GE 130 - 139MM HG: CPT | Performed by: NURSE PRACTITIONER

## 2024-01-19 PROCEDURE — 3080F DIAST BP >= 90 MM HG: CPT | Performed by: NURSE PRACTITIONER

## 2024-01-19 PROCEDURE — G8417 CALC BMI ABV UP PARAM F/U: HCPCS | Performed by: NURSE PRACTITIONER

## 2024-01-19 PROCEDURE — 1036F TOBACCO NON-USER: CPT | Performed by: NURSE PRACTITIONER

## 2024-01-19 PROCEDURE — G8484 FLU IMMUNIZE NO ADMIN: HCPCS | Performed by: NURSE PRACTITIONER

## 2024-01-19 PROCEDURE — G8427 DOCREV CUR MEDS BY ELIG CLIN: HCPCS | Performed by: NURSE PRACTITIONER

## 2024-01-19 PROCEDURE — 93000 ELECTROCARDIOGRAM COMPLETE: CPT | Performed by: NURSE PRACTITIONER

## 2024-01-19 PROCEDURE — 71046 X-RAY EXAM CHEST 2 VIEWS: CPT

## 2024-01-19 RX ORDER — PANTOPRAZOLE SODIUM 40 MG/1
40 TABLET, DELAYED RELEASE ORAL 2 TIMES DAILY
Qty: 90 TABLET | Refills: 3 | Status: SHIPPED | OUTPATIENT
Start: 2024-01-19

## 2024-01-19 NOTE — PATIENT INSTRUCTIONS
Continue current medications as prescribed.    Continue to monitor heart rate and blood pressure.     Allow yourself to recover from the respiratory illnesses.     Follow-up with your PCP as scheduled.    Follow-up with Pili CNP in  4 weeks as scheduled or sooner if need.

## 2024-01-19 NOTE — PROGRESS NOTES
Pt here for checkup     Pt continues  with chest pain, rates pain 4-5/10, radiates to left arm at times , notices in central and left sides of chest ,sob more fatigue, no energy,     Pt has had bronchitis and not felt good since christmas, has cough     EKG done today     Pt states med list is correct from PCP appt yesterday   
Date    INR 0.92 2019         No results found for: \"LABA1C\"    Lab Results   Component Value Date/Time    TRIG 173 10/11/2023 11:50 AM    HDL 60 10/11/2023 11:50 AM    LDLCALC 102 10/11/2023 11:50 AM       Lab Results   Component Value Date/Time    TSH 1.710 10/11/2023 11:50 AM         Testing Reviewed:      I have individually reviewed the cardiac test below:  EK2023      EKG: 10/17/2023  SR 94/min    EK2022  Sinus rhythm 100/min    ECHO: 2019  Indications:STEMI and Chest pain.   Additional Medical History:Hypertension, Family history of coronary artery  disease, Hyperlipidemia.   Summary   Ejection fraction was estimated at 55-60%.   There was mild mitral regurgitation.   There was trace tricuspid regurgitation.   Assuming RAP = 5 mmHg, the estimated RVSP = 27 mmHg.   Ascending aorta = 3.5 cm.    Signature    ----------------------------------------------------------------   Electronically signed by Simone Hahn MD (Interpreting   physician) on 2019 at 12:14 PM     Cath: 2019  INDICATION:  Recurrent anginal symptoms post RCA STEMI with known severe LAD disease.  SUMMARY:  Successful PCI of the LAD with one drug-eluting stent.  SIMONE HAHN MD  D: 2019     Assessment/Plan   STEMI 2019 s/p PCI x 2 IDANIA, and then LAD PCI afterwards, with residual OM branch stenosis   Statin-induced myalgias now on Repatha  Preserved EF  HyperTG  Right maxillary sinus retention cyst - 2.4 x 1.2 cm    Stable from cardiac status. No angina or evidence of decompensated HF, euvolemic on exam. Tolerating meds. No bleeding on DAPT. Some GI sounding sx - discussed evaluation by GI. Has had recurring cold sx since prior to Holidays  - following with PCP. Continues to have demanding lifestyle - little time to allow for rest and recovery. HR and BP mildly elevated in office today - seems ok at home. Encouraged to stay well hydrated. Obtain adequate rest daily - has not been sleeping - Ambien

## 2024-01-31 ENCOUNTER — HOSPITAL ENCOUNTER (OUTPATIENT)
Age: 49
Discharge: HOME | End: 2024-02-02
Attending: NURSE PRACTITIONER
Payer: COMMERCIAL

## 2024-01-31 VITALS
WEIGHT: 203 LBS | SYSTOLIC BLOOD PRESSURE: 138 MMHG | DIASTOLIC BLOOD PRESSURE: 93 MMHG | HEIGHT: 69 IN | BODY MASS INDEX: 30.07 KG/M2

## 2024-01-31 DIAGNOSIS — I10 ESSENTIAL HYPERTENSION: ICD-10-CM

## 2024-01-31 DIAGNOSIS — I25.10 CAD S/P PERCUTANEOUS CORONARY ANGIOPLASTY: ICD-10-CM

## 2024-01-31 DIAGNOSIS — R07.9 CHEST PAIN IN ADULT: ICD-10-CM

## 2024-01-31 DIAGNOSIS — Z98.61 CAD S/P PERCUTANEOUS CORONARY ANGIOPLASTY: ICD-10-CM

## 2024-01-31 LAB
ECHO AO ASC DIAM: 3.5 CM
ECHO AO ASCENDING AORTA INDEX: 1.68 CM/M2
ECHO AV CUSP MM: 2.3 CM
ECHO AV PEAK GRADIENT: 6 MMHG
ECHO AV PEAK VELOCITY: 1.2 M/S
ECHO AV VELOCITY RATIO: 0.83
ECHO BSA: 2.12 M2
ECHO EST RA PRESSURE: 5 MMHG
ECHO LA AREA 2C: 12.3 CM2
ECHO LA AREA 4C: 15.7 CM2
ECHO LA DIAMETER INDEX: 1.63 CM/M2
ECHO LA DIAMETER: 3.4 CM
ECHO LA MAJOR AXIS: 5.4 CM
ECHO LA MINOR AXIS: 5.3 CM
ECHO LA VOL BP: 30 ML (ref 18–58)
ECHO LA VOL MOD A2C: 24 ML (ref 18–58)
ECHO LA VOL MOD A4C: 37 ML (ref 18–58)
ECHO LA VOL/BSA BIPLANE: 14 ML/M2 (ref 16–34)
ECHO LA VOLUME INDEX MOD A2C: 12 ML/M2 (ref 16–34)
ECHO LA VOLUME INDEX MOD A4C: 18 ML/M2 (ref 16–34)
ECHO LV FRACTIONAL SHORTENING: 26 % (ref 28–44)
ECHO LV INTERNAL DIMENSION DIASTOLE INDEX: 2.26 CM/M2
ECHO LV INTERNAL DIMENSION DIASTOLIC: 4.7 CM (ref 4.2–5.9)
ECHO LV INTERNAL DIMENSION SYSTOLIC INDEX: 1.68 CM/M2
ECHO LV INTERNAL DIMENSION SYSTOLIC: 3.5 CM
ECHO LV ISOVOLUMETRIC RELAXATION TIME (IVRT): 81 MS
ECHO LV IVSD: 1.1 CM (ref 0.6–1)
ECHO LV MASS 2D: 187.5 G (ref 88–224)
ECHO LV MASS INDEX 2D: 90.2 G/M2 (ref 49–115)
ECHO LV POSTERIOR WALL DIASTOLIC: 1.1 CM (ref 0.6–1)
ECHO LV RELATIVE WALL THICKNESS RATIO: 0.47
ECHO LVOT PEAK GRADIENT: 4 MMHG
ECHO LVOT PEAK VELOCITY: 1 M/S
ECHO MV A VELOCITY: 0.57 M/S
ECHO MV E DECELERATION TIME (DT): 195 MS
ECHO MV E VELOCITY: 0.7 M/S
ECHO MV E/A RATIO: 1.23
ECHO MV REGURGITANT PEAK GRADIENT: 61 MMHG
ECHO MV REGURGITANT PEAK VELOCITY: 3.9 M/S
ECHO PULMONARY ARTERY END DIASTOLIC PRESSURE: 4 MMHG
ECHO PV MAX VELOCITY: 0.7 M/S
ECHO PV PEAK GRADIENT: 2 MMHG
ECHO PV REGURGITANT MAX VELOCITY: 1 M/S
ECHO RIGHT VENTRICULAR SYSTOLIC PRESSURE (RVSP): 23 MMHG
ECHO RV INTERNAL DIMENSION: 3.5 CM
ECHO RV TAPSE: 2 CM (ref 1.7–?)
ECHO TV E WAVE: 0.7 M/S
ECHO TV REGURGITANT MAX VELOCITY: 2.12 M/S
ECHO TV REGURGITANT PEAK GRADIENT: 18 MMHG

## 2024-01-31 PROCEDURE — 93306 TTE W/DOPPLER COMPLETE: CPT

## 2024-01-31 PROCEDURE — 93306 TTE W/DOPPLER COMPLETE: CPT | Performed by: INTERNAL MEDICINE

## 2024-02-05 RX ORDER — LEVOFLOXACIN 500 MG/1
500 TABLET, FILM COATED ORAL DAILY
Qty: 14 TABLET | Refills: 0 | Status: SHIPPED | OUTPATIENT
Start: 2024-02-05 | End: 2024-02-19

## 2024-02-20 DIAGNOSIS — I10 ESSENTIAL HYPERTENSION: ICD-10-CM

## 2024-02-20 DIAGNOSIS — R79.89 ELEVATED TSH: ICD-10-CM

## 2024-02-20 RX ORDER — LOSARTAN POTASSIUM AND HYDROCHLOROTHIAZIDE 12.5; 5 MG/1; MG/1
TABLET ORAL
Qty: 90 TABLET | Refills: 0 | Status: SHIPPED | OUTPATIENT
Start: 2024-02-20

## 2024-02-20 RX ORDER — LEVOTHYROXINE SODIUM 0.05 MG/1
TABLET ORAL
Qty: 90 TABLET | Refills: 0 | Status: SHIPPED | OUTPATIENT
Start: 2024-02-20

## 2024-02-23 ENCOUNTER — OFFICE VISIT (OUTPATIENT)
Dept: CARDIOLOGY CLINIC | Age: 49
End: 2024-02-23
Payer: COMMERCIAL

## 2024-02-23 VITALS
BODY MASS INDEX: 28.58 KG/M2 | HEIGHT: 70 IN | HEART RATE: 84 BPM | WEIGHT: 199.6 LBS | SYSTOLIC BLOOD PRESSURE: 115 MMHG | DIASTOLIC BLOOD PRESSURE: 80 MMHG

## 2024-02-23 DIAGNOSIS — R05.3 PERSISTENT COUGH FOR 3 WEEKS OR LONGER: Primary | ICD-10-CM

## 2024-02-23 DIAGNOSIS — J34.1 MAXILLARY SINUS CYST: ICD-10-CM

## 2024-02-23 PROCEDURE — 99214 OFFICE O/P EST MOD 30 MIN: CPT | Performed by: NURSE PRACTITIONER

## 2024-02-23 PROCEDURE — 1036F TOBACCO NON-USER: CPT | Performed by: NURSE PRACTITIONER

## 2024-02-23 PROCEDURE — 3079F DIAST BP 80-89 MM HG: CPT | Performed by: NURSE PRACTITIONER

## 2024-02-23 PROCEDURE — 3074F SYST BP LT 130 MM HG: CPT | Performed by: NURSE PRACTITIONER

## 2024-02-23 PROCEDURE — G8427 DOCREV CUR MEDS BY ELIG CLIN: HCPCS | Performed by: NURSE PRACTITIONER

## 2024-02-23 PROCEDURE — G8484 FLU IMMUNIZE NO ADMIN: HCPCS | Performed by: NURSE PRACTITIONER

## 2024-02-23 PROCEDURE — G8417 CALC BMI ABV UP PARAM F/U: HCPCS | Performed by: NURSE PRACTITIONER

## 2024-02-23 RX ORDER — BENZONATATE 100 MG/1
100-200 CAPSULE ORAL 3 TIMES DAILY PRN
Qty: 60 CAPSULE | Refills: 1 | Status: SHIPPED | OUTPATIENT
Start: 2024-02-23

## 2024-02-23 NOTE — PATIENT INSTRUCTIONS
Continue current medications as prescribed.    See the ENT as scheduled.     Stay active but try to regular rest.     Follow-up with your PCP as scheduled.    If ENT work-up negative may need to look into labs from something more viral that is lingering.     Follow-up with Dr. Tellez    as scheduled or sooner if need.

## 2024-02-23 NOTE — PROGRESS NOTES
[facility] [place] [facility]  [facility] CARDIOLOGY  [address]  [address]  [facility] [address]  Dept: [phone]  Dept Fax: [phone]  Loc: [phone]    Visit Date: 2/23/2024    Primary cardiologist: Mikey Tellez MD    Mr. Paez is a 48 y.o. male  who presented for: 4 week follow-up    Chief Complaint   Patient presents with    1 Month Follow-Up       HPI:   HPI   Last seen in office on 1/19/24 per this writer. Per office note:  Assessment/Plan   STEMI 11/2019 s/p PCI x 2 IDANIA, and then LAD PCI afterwards, with residual OM branch stenosis   Statin-induced myalgias now on Repatha  Preserved EF  HyperTG  Right maxillary sinus retention cyst - 2.4 x 1.2 cm     Stable from cardiac status. No angina or evidence of decompensated HF, euvolemic on exam. Tolerating meds. No bleeding on DAPT. Some GI sounding sx - discussed evaluation by GI. Has had recurring cold sx since prior to Holidays  - following with PCP. Continues to have demanding lifestyle - little time to allow for rest and recovery. HR and BP mildly elevated in office today - seems ok at home. Encouraged to stay well hydrated. Obtain adequate rest daily - has not been sleeping - Ambien ordered per PCP.   No dx testing at this time - will allow to recover and then re-evaluate including need for GI referral.      Patient instructions:   Continue current medications as prescribed.  Continue to monitor heart rate and blood pressure.   Allow yourself to recover from the respiratory illnesses.   Follow-up with your PCP as scheduled.  Follow-up with Pili DAVIS in  4 weeks as scheduled     Last seen in office on 10/17/2023 per this writer. Per office note:  Assessment/Plan   STEMI 11/2019 s/p PCI x 2 IDANIA, and then LAD PCI afterwards, with residual OM branch stenosis   Statin-induced myalgias  Preserved EF  HyperTG  Right maxillary sinus retention cyst - 2.4 x 1.2 cm     Some atypical chest sx; different from prior chest discomfort -

## 2024-03-25 DIAGNOSIS — G47.00 INSOMNIA, UNSPECIFIED TYPE: ICD-10-CM

## 2024-03-25 RX ORDER — ZOLPIDEM TARTRATE 10 MG/1
10 TABLET ORAL NIGHTLY PRN
Qty: 30 TABLET | Refills: 5 | Status: SHIPPED | OUTPATIENT
Start: 2024-03-25 | End: 2024-09-21

## 2024-05-13 DIAGNOSIS — G47.00 INSOMNIA, UNSPECIFIED TYPE: ICD-10-CM

## 2024-05-13 DIAGNOSIS — K21.9 GASTROESOPHAGEAL REFLUX DISEASE, UNSPECIFIED WHETHER ESOPHAGITIS PRESENT: ICD-10-CM

## 2024-05-13 RX ORDER — ZOLPIDEM TARTRATE 10 MG/1
10 TABLET ORAL NIGHTLY PRN
Qty: 30 TABLET | Refills: 5 | Status: CANCELLED | OUTPATIENT
Start: 2024-05-13 | End: 2024-11-09

## 2024-05-14 RX ORDER — PANTOPRAZOLE SODIUM 40 MG/1
40 TABLET, DELAYED RELEASE ORAL 2 TIMES DAILY
Qty: 90 TABLET | Refills: 2 | Status: SHIPPED | OUTPATIENT
Start: 2024-05-14

## 2024-05-21 DIAGNOSIS — R79.89 ELEVATED TSH: ICD-10-CM

## 2024-05-21 DIAGNOSIS — I10 ESSENTIAL HYPERTENSION: ICD-10-CM

## 2024-05-21 RX ORDER — CARVEDILOL 6.25 MG/1
6.25 TABLET ORAL 2 TIMES DAILY WITH MEALS
Qty: 180 TABLET | Refills: 2 | Status: SHIPPED | OUTPATIENT
Start: 2024-05-21

## 2024-05-21 RX ORDER — LEVOTHYROXINE SODIUM 0.05 MG/1
TABLET ORAL
Qty: 90 TABLET | Refills: 2 | Status: SHIPPED | OUTPATIENT
Start: 2024-05-21

## 2024-05-21 RX ORDER — LOSARTAN POTASSIUM AND HYDROCHLOROTHIAZIDE 12.5; 5 MG/1; MG/1
TABLET ORAL
Qty: 90 TABLET | Refills: 2 | Status: SHIPPED | OUTPATIENT
Start: 2024-05-21

## 2024-07-03 DIAGNOSIS — G47.00 INSOMNIA, UNSPECIFIED TYPE: ICD-10-CM

## 2024-07-03 RX ORDER — ZOLPIDEM TARTRATE 10 MG/1
10 TABLET ORAL NIGHTLY PRN
Qty: 30 TABLET | Refills: 5 | Status: SHIPPED | OUTPATIENT
Start: 2024-07-03 | End: 2024-12-30

## 2024-07-09 ENCOUNTER — OFFICE VISIT (OUTPATIENT)
Dept: ENT CLINIC | Age: 49
End: 2024-07-09
Payer: COMMERCIAL

## 2024-07-09 VITALS
TEMPERATURE: 97.7 F | OXYGEN SATURATION: 96 % | DIASTOLIC BLOOD PRESSURE: 78 MMHG | BODY MASS INDEX: 28.88 KG/M2 | HEART RATE: 83 BPM | HEIGHT: 70 IN | SYSTOLIC BLOOD PRESSURE: 118 MMHG | WEIGHT: 201.7 LBS

## 2024-07-09 DIAGNOSIS — G50.0 TRIGEMINAL NEURALGIA OF RIGHT SIDE OF FACE: ICD-10-CM

## 2024-07-09 DIAGNOSIS — J32.0 CHRONIC MAXILLARY SINUSITIS: Primary | ICD-10-CM

## 2024-07-09 DIAGNOSIS — J34.1 MAXILLARY SINUS CYST: ICD-10-CM

## 2024-07-09 PROCEDURE — 3074F SYST BP LT 130 MM HG: CPT | Performed by: OTOLARYNGOLOGY

## 2024-07-09 PROCEDURE — 99215 OFFICE O/P EST HI 40 MIN: CPT | Performed by: OTOLARYNGOLOGY

## 2024-07-09 PROCEDURE — G8427 DOCREV CUR MEDS BY ELIG CLIN: HCPCS | Performed by: OTOLARYNGOLOGY

## 2024-07-09 PROCEDURE — 1036F TOBACCO NON-USER: CPT | Performed by: OTOLARYNGOLOGY

## 2024-07-09 PROCEDURE — 3078F DIAST BP <80 MM HG: CPT | Performed by: OTOLARYNGOLOGY

## 2024-07-09 PROCEDURE — G8417 CALC BMI ABV UP PARAM F/U: HCPCS | Performed by: OTOLARYNGOLOGY

## 2024-07-09 NOTE — PROGRESS NOTES
[facility] [place] [facility]  [facility] EAR, NOSE AND THROAT  [address]  [facility] [address]  Dept: [phone]  Dept Fax: [phone]  Loc: [phone]    Moises Paez is a 49 y.o. male who was referred by Pili Whiteside, APR* for:  Chief Complaint   Patient presents with    Cyst     Patient here for second opinion regarding his maxillary sinus cyst. Patient complains of right sided congestion and tingling. He states he gets daily headaches. Previously seen by Dr Lima. Referred by Pili Whiteside CNP.        HPI:     Moises Paez is a 49 y.o. male with a history of right maxillary sinus retention cysts and right-sided face pain radiating up to his right orbit and his right temporal area for the past couple of years.  He has been seen by Dr. 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  His symptoms tend not to respond to antibiotic therapy and may have respond to nonsteroidal medications to some degree.  He is very bothered by this process and does not experience it on the left side of his face at all.  He also denies any dental pain associated with the problem.     History:     Allergies   Allergen Reactions    Statins Other (See Comments)     Leg cramps and joint pain     Current Outpatient Medications   Medication Sig Dispense Refill    zolpidem (AMBIEN) 10 MG tablet Take 1 tablet by mouth nightly as needed for Sleep for up to 180 days. Max Daily Amount: 10 mg 30 tablet 5    carvedilol (COREG) 6.25 MG tablet take 1 tablet by mouth twice a day with meals 180 tablet 2    levothyroxine (SYNTHROID) 50 MCG tablet take 1 tablet by mouth once daily 90 tablet 2    losartan-hydroCHLOROthiazide (HYZAAR) 50-12.5 MG per tablet take 1 tablet by mouth once daily 90 tablet 2    pantoprazole (PROTONIX) 40 MG tablet Take 1

## 2024-07-14 PROBLEM — G50.0 TRIGEMINAL NEURALGIA OF RIGHT SIDE OF FACE: Status: ACTIVE | Noted: 2024-07-14

## 2024-07-25 ENCOUNTER — HOSPITAL ENCOUNTER (OUTPATIENT)
Dept: CT IMAGING | Age: 49
Discharge: HOME | End: 2024-07-25
Attending: OTOLARYNGOLOGY
Payer: COMMERCIAL

## 2024-07-25 DIAGNOSIS — G50.0 TRIGEMINAL NEURALGIA OF RIGHT SIDE OF FACE: ICD-10-CM

## 2024-07-25 DIAGNOSIS — J34.1 MAXILLARY SINUS CYST: ICD-10-CM

## 2024-07-25 DIAGNOSIS — J32.0 CHRONIC MAXILLARY SINUSITIS: ICD-10-CM

## 2024-07-25 PROCEDURE — 70486 CT MAXILLOFACIAL W/O DYE: CPT

## 2024-08-12 DIAGNOSIS — Z12.11 COLON CANCER SCREENING: ICD-10-CM

## 2024-08-12 DIAGNOSIS — E03.9 HYPOTHYROIDISM, UNSPECIFIED TYPE: ICD-10-CM

## 2024-08-12 DIAGNOSIS — E78.2 MIXED HYPERLIPIDEMIA: Primary | ICD-10-CM

## 2024-08-12 DIAGNOSIS — I10 ESSENTIAL HYPERTENSION: ICD-10-CM

## 2024-08-12 DIAGNOSIS — G47.00 INSOMNIA, UNSPECIFIED TYPE: ICD-10-CM

## 2024-08-12 RX ORDER — ZOLPIDEM TARTRATE 10 MG/1
10 TABLET ORAL NIGHTLY PRN
Qty: 30 TABLET | Refills: 5 | OUTPATIENT
Start: 2024-08-12 | End: 2025-02-08

## 2024-09-06 ENCOUNTER — TELEPHONE (OUTPATIENT)
Dept: CARDIOLOGY CLINIC | Age: 49
End: 2024-09-06

## 2024-09-24 LAB — NONINV COLON CA DNA+OCC BLD SCRN STL QL: NEGATIVE

## 2024-10-14 DIAGNOSIS — G47.00 INSOMNIA, UNSPECIFIED TYPE: ICD-10-CM

## 2024-10-15 RX ORDER — ZOLPIDEM TARTRATE 10 MG/1
10 TABLET ORAL NIGHTLY PRN
Qty: 30 TABLET | Refills: 5 | OUTPATIENT
Start: 2024-10-15 | End: 2025-04-13

## 2024-10-17 RX ORDER — FENOFIBRATE 145 MG/1
145 TABLET, COATED ORAL DAILY
Qty: 90 TABLET | Refills: 3 | Status: SHIPPED | OUTPATIENT
Start: 2024-10-17

## 2024-10-23 DIAGNOSIS — K21.9 GASTROESOPHAGEAL REFLUX DISEASE, UNSPECIFIED WHETHER ESOPHAGITIS PRESENT: ICD-10-CM

## 2024-10-24 LAB
ALBUMIN: 4.4 G/DL (ref 3.5–5.2)
ALK PHOSPHATASE: 43 U/L (ref 39–118)
ALT SERPL-CCNC: 21 U/L (ref 5–41)
ANION GAP SERPL CALCULATED.3IONS-SCNC: 10 MMOL/L (ref 7–16)
AST SERPL-CCNC: 22 U/L (ref 9–50)
BASOPHILS ABSOLUTE: 0.04 K/UL (ref 0–0.2)
BASOPHILS RELATIVE PERCENT: 0.8 % (ref 0–2)
BILIRUB SERPL-MCNC: 0.4 MG/DL
BUN BLDV-MCNC: 13 MG/DL (ref 6–20)
CALCIUM SERPL-MCNC: 10.5 MG/DL (ref 8.6–10.5)
CHLORIDE BLD-SCNC: 102 MMOL/L (ref 96–107)
CHOLESTEROL, TOTAL: 152 MG/DL (ref 100–199)
CHOLESTEROL/HDL RATIO: 2.4 (ref 2–4.5)
CO2: 27 MMOL/L (ref 18–32)
CREAT SERPL-MCNC: 1.29 MG/DL (ref 0.67–1.3)
EGFR IF NONAFRICAN AMERICAN: 68 ML/MIN/1.73M2
EOSINOPHILS ABSOLUTE: 0.04 K/UL (ref 0–0.8)
EOSINOPHILS RELATIVE PERCENT: 0.8 % (ref 0–5)
GLUCOSE: 95 MG/DL (ref 70–100)
HCT VFR BLD CALC: 42.1 % (ref 39–52)
HDLC SERPL-MCNC: 63 MG/DL
HEMOGLOBIN: 14.4 G/DL (ref 13–18)
IMMATURE GRANS (ABS): 0.02 K/UL (ref 0–0.06)
IMMATURE GRANULOCYTES %: 0.4 % (ref 0–2)
LDL CHOLESTEROL: 66 MG/DL
LDL/HDL RATIO: 1
LYMPHOCYTES ABSOLUTE: 0.97 K/UL (ref 0.9–5.2)
LYMPHOCYTES RELATIVE PERCENT: 20.3 % (ref 20–45)
MCH RBC QN AUTO: 31.1 PG (ref 26–32)
MCHC RBC AUTO-ENTMCNC: 34.2 G/DL (ref 32–35)
MCV RBC AUTO: 91 FL (ref 75–100)
MONOCYTES ABSOLUTE: 0.57 K/UL (ref 0.1–1)
MONOCYTES RELATIVE PERCENT: 11.9 % (ref 0–13)
NEUTROPHILS ABSOLUTE: 3.15 K/UL (ref 1.9–8)
NEUTROPHILS RELATIVE PERCENT: 65.8 % (ref 45–75)
PDW BLD-RTO: 11.9 % (ref 11.2–14.8)
PLATELET # BLD: 398 THOUS/CMM (ref 140–440)
POTASSIUM SERPL-SCNC: 4.7 MMOL/L (ref 3.5–5.4)
RBC # BLD: 4.63 MILL/CMM (ref 4.4–6.1)
SODIUM BLD-SCNC: 139 MMOL/L (ref 135–148)
T4 FREE: 1.05 NG/DL (ref 0.8–1.9)
TOTAL PROTEIN: 7.9 G/DL (ref 6–8.3)
TRIGL SERPL-MCNC: 117 MG/DL (ref 20–149)
TSH SERPL DL<=0.05 MIU/L-ACNC: 0.96 UIU/ML (ref 0.27–4.2)
VLDLC SERPL CALC-MCNC: 23 MG/DL
WBC # BLD: 4.8 THDS/CMM (ref 3.6–11)

## 2024-10-24 RX ORDER — PANTOPRAZOLE SODIUM 40 MG/1
40 TABLET, DELAYED RELEASE ORAL 2 TIMES DAILY
Qty: 90 TABLET | Refills: 0 | Status: SHIPPED | OUTPATIENT
Start: 2024-10-24

## 2024-10-28 ENCOUNTER — TELEPHONE (OUTPATIENT)
Dept: ENT CLINIC | Age: 49
End: 2024-10-28

## 2024-10-28 ENCOUNTER — OFFICE VISIT (OUTPATIENT)
Dept: CARDIOLOGY CLINIC | Age: 49
End: 2024-10-28
Payer: COMMERCIAL

## 2024-10-28 ENCOUNTER — OFFICE VISIT (OUTPATIENT)
Dept: ENT CLINIC | Age: 49
End: 2024-10-28
Payer: COMMERCIAL

## 2024-10-28 VITALS
HEART RATE: 77 BPM | WEIGHT: 200.8 LBS | TEMPERATURE: 97.5 F | SYSTOLIC BLOOD PRESSURE: 118 MMHG | HEIGHT: 70 IN | DIASTOLIC BLOOD PRESSURE: 76 MMHG | RESPIRATION RATE: 18 BRPM | BODY MASS INDEX: 28.75 KG/M2 | OXYGEN SATURATION: 96 %

## 2024-10-28 VITALS
WEIGHT: 200 LBS | BODY MASS INDEX: 28.63 KG/M2 | HEART RATE: 72 BPM | DIASTOLIC BLOOD PRESSURE: 62 MMHG | SYSTOLIC BLOOD PRESSURE: 98 MMHG | HEIGHT: 70 IN

## 2024-10-28 DIAGNOSIS — J34.3 HYPERTROPHY OF POSTERIOR END OF INFERIOR TURBINATE: ICD-10-CM

## 2024-10-28 DIAGNOSIS — I25.10 CAD S/P PERCUTANEOUS CORONARY ANGIOPLASTY: Primary | ICD-10-CM

## 2024-10-28 DIAGNOSIS — J34.1 MAXILLARY SINUS CYST: ICD-10-CM

## 2024-10-28 DIAGNOSIS — J34.89 REFRACTORY OBSTRUCTION OF NASAL AIRWAY: ICD-10-CM

## 2024-10-28 DIAGNOSIS — Z98.61 CAD S/P PERCUTANEOUS CORONARY ANGIOPLASTY: Primary | ICD-10-CM

## 2024-10-28 DIAGNOSIS — J32.0 CHRONIC MAXILLARY SINUSITIS: ICD-10-CM

## 2024-10-28 DIAGNOSIS — Z01.818 PRE-OP TESTING: Primary | ICD-10-CM

## 2024-10-28 DIAGNOSIS — G50.0 TRIGEMINAL NEURALGIA OF RIGHT SIDE OF FACE: ICD-10-CM

## 2024-10-28 DIAGNOSIS — I10 ESSENTIAL HYPERTENSION: ICD-10-CM

## 2024-10-28 DIAGNOSIS — J34.89 REFRACTORY OBSTRUCTION OF NASAL AIRWAY: Primary | ICD-10-CM

## 2024-10-28 PROCEDURE — G8484 FLU IMMUNIZE NO ADMIN: HCPCS | Performed by: OTOLARYNGOLOGY

## 2024-10-28 PROCEDURE — 99215 OFFICE O/P EST HI 40 MIN: CPT | Performed by: OTOLARYNGOLOGY

## 2024-10-28 PROCEDURE — G8427 DOCREV CUR MEDS BY ELIG CLIN: HCPCS | Performed by: INTERNAL MEDICINE

## 2024-10-28 PROCEDURE — 3078F DIAST BP <80 MM HG: CPT | Performed by: OTOLARYNGOLOGY

## 2024-10-28 PROCEDURE — 1036F TOBACCO NON-USER: CPT | Performed by: OTOLARYNGOLOGY

## 2024-10-28 PROCEDURE — G8417 CALC BMI ABV UP PARAM F/U: HCPCS | Performed by: OTOLARYNGOLOGY

## 2024-10-28 PROCEDURE — 3074F SYST BP LT 130 MM HG: CPT | Performed by: INTERNAL MEDICINE

## 2024-10-28 PROCEDURE — G8484 FLU IMMUNIZE NO ADMIN: HCPCS | Performed by: INTERNAL MEDICINE

## 2024-10-28 PROCEDURE — 1036F TOBACCO NON-USER: CPT | Performed by: INTERNAL MEDICINE

## 2024-10-28 PROCEDURE — 99214 OFFICE O/P EST MOD 30 MIN: CPT | Performed by: INTERNAL MEDICINE

## 2024-10-28 PROCEDURE — 3074F SYST BP LT 130 MM HG: CPT | Performed by: OTOLARYNGOLOGY

## 2024-10-28 PROCEDURE — 3078F DIAST BP <80 MM HG: CPT | Performed by: INTERNAL MEDICINE

## 2024-10-28 PROCEDURE — G8417 CALC BMI ABV UP PARAM F/U: HCPCS | Performed by: INTERNAL MEDICINE

## 2024-10-28 PROCEDURE — G8427 DOCREV CUR MEDS BY ELIG CLIN: HCPCS | Performed by: OTOLARYNGOLOGY

## 2024-10-28 RX ORDER — EVOLOCUMAB 140 MG/ML
140 INJECTION, SOLUTION SUBCUTANEOUS
Qty: 6 ADJUSTABLE DOSE PRE-FILLED PEN SYRINGE | Refills: 3 | Status: SHIPPED | OUTPATIENT
Start: 2024-10-28

## 2024-10-28 NOTE — TELEPHONE ENCOUNTER
Moises is scheduled for surgery with Dr Vanegas on 2/6/25. We are requesting cardiac clearance and authorization to hold Brilinta for 7 days.    Surgery:  Image guided maxillary antrostomy-right, Maxillary limon sukhdeep with removal of polyp-right, Inferior turbinate reduction- right    Please advise.    Thank you!

## 2024-10-28 NOTE — PROGRESS NOTES
[facility] [place] [facility]  [facility] EAR, NOSE AND THROAT  [address]  [address]  [facility] [address]  Dept: [phone]  Dept Fax: [phone]  Loc: [phone]    Moises Paez is a 49 y.o. male who was referred by Pili Whiteside, APR* for:  Chief Complaint   Patient presents with    Follow-up     Patient is here today to f/u maxillary sinus cyst. Patient states that things have been going about the same.       HPI:     Moises Paez is a 49 y.o. male with a history of right-sided nasal airway obstruction status post nasal septoplasty and left-sided inferior turbinate reduction by Dr. Lima in the past.  The patient came to see me for second opinion evaluation of right-sided pathology that he believes has significantly worsened since his septoplasty by Dr. Lima.  He had a repeat sinus CT because of the 1 that had served as the basis of his prior nasal surgery was 2 years old.  The reason for this was to see if the cyst in the right maxillary sinus had grown significantly and was in fact impinging on his infraorbital nerve, the most anterior root of the root of the V2 trigeminal nerve system.  The patient is here today with his wife and reports if anything his symptoms are worse.  In addition he has read his own CT scan report and is concerned about the fact that the mass has in fact grown.  In the meantime the patient has tried spraying lidocaine into his nasal airway with equivocal improvement of his nasal and facial symptoms.  He also tried topical lidocaine for neurological symptoms and found that he could actually get relief for 1 or 2 hours at a time with these applications applied to the right pretragal face with good repeatability.  He still has his other symptoms of central facial discomfort which is still on the right side of his face and chronic nasal airway obstruction which is very bothersome for him.   History:     Allergies   Allergen Reactions    Statins Other

## 2024-10-28 NOTE — PROGRESS NOTES
[facility] [place] [facility]  [facility] EAR, NOSE AND THROAT  [address]   [address]  [place] [address]  Dept: [phone]  Dept Fax: [phone]  Loc: [phone]    Moises Paez is a 49 y.o. male who was referred by Pili Whiteside, APR* for:  Chief Complaint   Patient presents with   • Follow-up     Patient is here today to f/u maxillary sinus cyst. Patient states that things have been going about the same.       HPI:     Moises Paez is a 49 y.o. male      History:     Allergies   Allergen Reactions   • Statins Other (See Comments)     Leg cramps and joint pain     Current Outpatient Medications   Medication Sig Dispense Refill   • Evolocumab (REPATHA SURECLICK) 140 MG/ML SOAJ Inject 140 mg into the skin every 14 days 6 Adjustable Dose Pre-filled Pen Syringe 3   • pantoprazole (PROTONIX) 40 MG tablet Take 1 tablet by mouth in the morning and at bedtime 90 tablet 0   • fenofibrate (TRICOR) 145 MG tablet Take 1 tablet by mouth daily 90 tablet 3   • zolpidem (AMBIEN) 10 MG tablet Take 1 tablet by mouth nightly as needed for Sleep for up to 180 days. Max Daily Amount: 10 mg 30 tablet 5   • carvedilol (COREG) 6.25 MG tablet take 1 tablet by mouth twice a day with meals 180 tablet 2   • levothyroxine (SYNTHROID) 50 MCG tablet take 1 tablet by mouth once daily 90 tablet 2   • losartan-hydroCHLOROthiazide (HYZAAR) 50-12.5 MG per tablet take 1 tablet by mouth once daily 90 tablet 2   • ticagrelor (BRILINTA) 90 MG TABS tablet take 1 tablet by mouth every morning and 1 tablet at bedtime 180 tablet 3   • Omega-3 Fatty Acids (FISH OIL) 1000 MG CAPS Take 2 capsules by mouth 2 times daily     • Coenzyme Q10 (CO Q-10) 200 MG CAPS Take by mouth daily     • nitroGLYCERIN (NITROSTAT) 0.4 MG SL tablet Place 1 tablet under the tongue every 5 minutes as needed for Chest pain up to max of 3 total doses. If no relief after 1 dose, call 911. 25 tablet 3     No current facility-administered medications

## 2024-11-04 ENCOUNTER — OFFICE VISIT (OUTPATIENT)
Dept: FAMILY MEDICINE CLINIC | Age: 49
End: 2024-11-04
Payer: COMMERCIAL

## 2024-11-04 VITALS
DIASTOLIC BLOOD PRESSURE: 80 MMHG | WEIGHT: 197.8 LBS | HEART RATE: 90 BPM | SYSTOLIC BLOOD PRESSURE: 118 MMHG | TEMPERATURE: 98.7 F | BODY MASS INDEX: 28.38 KG/M2 | OXYGEN SATURATION: 96 %

## 2024-11-04 DIAGNOSIS — Z00.00 WELL ADULT EXAM: Primary | ICD-10-CM

## 2024-11-04 DIAGNOSIS — K21.9 GASTROESOPHAGEAL REFLUX DISEASE, UNSPECIFIED WHETHER ESOPHAGITIS PRESENT: ICD-10-CM

## 2024-11-04 DIAGNOSIS — G47.00 INSOMNIA, UNSPECIFIED TYPE: ICD-10-CM

## 2024-11-04 PROCEDURE — G8484 FLU IMMUNIZE NO ADMIN: HCPCS | Performed by: FAMILY MEDICINE

## 2024-11-04 PROCEDURE — 3079F DIAST BP 80-89 MM HG: CPT | Performed by: FAMILY MEDICINE

## 2024-11-04 PROCEDURE — 99396 PREV VISIT EST AGE 40-64: CPT | Performed by: FAMILY MEDICINE

## 2024-11-04 PROCEDURE — 3074F SYST BP LT 130 MM HG: CPT | Performed by: FAMILY MEDICINE

## 2024-11-04 RX ORDER — PANTOPRAZOLE SODIUM 40 MG/1
40 TABLET, DELAYED RELEASE ORAL 2 TIMES DAILY
Qty: 90 TABLET | Refills: 3 | Status: SHIPPED | OUTPATIENT
Start: 2024-11-04

## 2024-11-04 RX ORDER — ZOLPIDEM TARTRATE 10 MG/1
10 TABLET ORAL NIGHTLY PRN
Qty: 30 TABLET | Refills: 5 | Status: SHIPPED | OUTPATIENT
Start: 2024-11-04 | End: 2025-05-03

## 2024-11-04 SDOH — ECONOMIC STABILITY: INCOME INSECURITY: HOW HARD IS IT FOR YOU TO PAY FOR THE VERY BASICS LIKE FOOD, HOUSING, MEDICAL CARE, AND HEATING?: NOT HARD AT ALL

## 2024-11-04 SDOH — ECONOMIC STABILITY: FOOD INSECURITY: WITHIN THE PAST 12 MONTHS, THE FOOD YOU BOUGHT JUST DIDN'T LAST AND YOU DIDN'T HAVE MONEY TO GET MORE.: NEVER TRUE

## 2024-11-04 SDOH — ECONOMIC STABILITY: FOOD INSECURITY: WITHIN THE PAST 12 MONTHS, YOU WORRIED THAT YOUR FOOD WOULD RUN OUT BEFORE YOU GOT MONEY TO BUY MORE.: NEVER TRUE

## 2024-11-04 ASSESSMENT — PATIENT HEALTH QUESTIONNAIRE - PHQ9
1. LITTLE INTEREST OR PLEASURE IN DOING THINGS: NOT AT ALL
SUM OF ALL RESPONSES TO PHQ QUESTIONS 1-9: 0
SUM OF ALL RESPONSES TO PHQ9 QUESTIONS 1 & 2: 0
2. FEELING DOWN, DEPRESSED OR HOPELESS: NOT AT ALL
SUM OF ALL RESPONSES TO PHQ QUESTIONS 1-9: 0

## 2024-11-05 ASSESSMENT — ENCOUNTER SYMPTOMS
SINUS PRESSURE: 0
RHINORRHEA: 0
DIARRHEA: 0
CONSTIPATION: 0
COUGH: 0
NAUSEA: 0
ABDOMINAL DISTENTION: 0
EYE PAIN: 0
SHORTNESS OF BREATH: 0
ABDOMINAL PAIN: 0
SORE THROAT: 0

## 2024-11-05 NOTE — PROGRESS NOTES
The patient (or guardian, if applicable) and other individuals in attendance at the appointment consented to the use of AI, including the recording.          The patient (or guardian, if applicable) and other individuals in attendance with the patient were advised that Artificial Intelligence will be utilized during this visit to record, process the conversation to generate a clinical note, and support improvement of the AI technology. The patient (or guardian, if applicable) and other individuals in attendance at the appointment consented to the use of AI, including the recording.                           Electronically signed by Ford Trevizo MD on 11/5/2024 at 12:45 PM

## 2024-11-12 RX ORDER — GENTAMICIN SULFATE 3 MG/ML
1 SOLUTION/ DROPS OPHTHALMIC 4 TIMES DAILY
Qty: 5 ML | Refills: 0 | Status: SHIPPED | OUTPATIENT
Start: 2024-11-12 | End: 2024-11-22

## 2024-11-25 ENCOUNTER — PREP FOR PROCEDURE (OUTPATIENT)
Dept: ENT CLINIC | Age: 49
End: 2024-11-25

## 2024-12-10 RX ORDER — EVOLOCUMAB 140 MG/ML
INJECTION, SOLUTION SUBCUTANEOUS
Qty: 6 ML | Refills: 3 | Status: SHIPPED | OUTPATIENT
Start: 2024-12-10

## 2024-12-16 DIAGNOSIS — R79.89 ELEVATED TSH: ICD-10-CM

## 2024-12-16 DIAGNOSIS — K21.9 GASTROESOPHAGEAL REFLUX DISEASE, UNSPECIFIED WHETHER ESOPHAGITIS PRESENT: ICD-10-CM

## 2024-12-16 DIAGNOSIS — G47.00 INSOMNIA, UNSPECIFIED TYPE: ICD-10-CM

## 2024-12-16 DIAGNOSIS — I10 ESSENTIAL HYPERTENSION: ICD-10-CM

## 2024-12-16 RX ORDER — CARVEDILOL 6.25 MG/1
6.25 TABLET ORAL 2 TIMES DAILY WITH MEALS
Qty: 180 TABLET | Refills: 3 | Status: SHIPPED | OUTPATIENT
Start: 2024-12-16

## 2024-12-16 RX ORDER — FENOFIBRATE 145 MG/1
145 TABLET, COATED ORAL DAILY
Qty: 90 TABLET | Refills: 3 | Status: SHIPPED | OUTPATIENT
Start: 2024-12-16

## 2024-12-16 RX ORDER — PANTOPRAZOLE SODIUM 40 MG/1
40 TABLET, DELAYED RELEASE ORAL 2 TIMES DAILY
Qty: 90 TABLET | Refills: 3 | Status: SHIPPED | OUTPATIENT
Start: 2024-12-16

## 2024-12-16 RX ORDER — ZOLPIDEM TARTRATE 10 MG/1
10 TABLET ORAL NIGHTLY PRN
Qty: 30 TABLET | Refills: 5 | OUTPATIENT
Start: 2024-12-16 | End: 2025-06-14

## 2024-12-16 RX ORDER — LEVOTHYROXINE SODIUM 50 UG/1
TABLET ORAL
Qty: 90 TABLET | Refills: 3 | Status: SHIPPED | OUTPATIENT
Start: 2024-12-16

## 2024-12-16 RX ORDER — LOSARTAN POTASSIUM AND HYDROCHLOROTHIAZIDE 12.5; 5 MG/1; MG/1
TABLET ORAL
Qty: 90 TABLET | Refills: 3 | Status: SHIPPED | OUTPATIENT
Start: 2024-12-16

## 2024-12-26 RX ORDER — AZITHROMYCIN 500 MG/1
500 TABLET, FILM COATED ORAL DAILY
Qty: 3 TABLET | Refills: 0 | Status: SHIPPED | OUTPATIENT
Start: 2024-12-26 | End: 2024-12-29

## 2025-01-07 DIAGNOSIS — I10 ESSENTIAL HYPERTENSION: ICD-10-CM

## 2025-01-07 RX ORDER — LOSARTAN POTASSIUM AND HYDROCHLOROTHIAZIDE 12.5; 5 MG/1; MG/1
TABLET ORAL
Qty: 90 TABLET | Refills: 2 | Status: SHIPPED | OUTPATIENT
Start: 2025-01-07

## 2025-01-27 ENCOUNTER — HOSPITAL ENCOUNTER (OUTPATIENT)
Dept: GENERAL RADIOLOGY | Age: 50
Discharge: HOME | End: 2025-01-27
Payer: COMMERCIAL

## 2025-01-27 ENCOUNTER — HOSPITAL ENCOUNTER (OUTPATIENT)
Age: 50
Discharge: HOME | End: 2025-01-27
Payer: COMMERCIAL

## 2025-01-27 DIAGNOSIS — Z01.818 PRE-OP TESTING: ICD-10-CM

## 2025-01-27 DIAGNOSIS — J34.1 MAXILLARY SINUS CYST: ICD-10-CM

## 2025-01-27 DIAGNOSIS — J34.3 HYPERTROPHY OF POSTERIOR END OF INFERIOR TURBINATE: ICD-10-CM

## 2025-01-27 DIAGNOSIS — J34.89 REFRACTORY OBSTRUCTION OF NASAL AIRWAY: ICD-10-CM

## 2025-01-27 DIAGNOSIS — J32.0 CHRONIC MAXILLARY SINUSITIS: ICD-10-CM

## 2025-01-27 LAB
ALBUMIN SERPL BCG-MCNC: 4.7 G/DL (ref 3.5–5.1)
ALP SERPL-CCNC: 44 U/L (ref 38–126)
ALT SERPL W/O P-5'-P-CCNC: 29 U/L (ref 11–66)
ANION GAP SERPL CALC-SCNC: 13 MEQ/L (ref 8–16)
AST SERPL-CCNC: 27 U/L (ref 5–40)
BASOPHILS ABSOLUTE: 0.1 THOU/MM3 (ref 0–0.1)
BASOPHILS NFR BLD AUTO: 1.3 %
BILIRUB SERPL-MCNC: 0.3 MG/DL (ref 0.3–1.2)
BUN SERPL-MCNC: 10 MG/DL (ref 7–22)
CALCIUM SERPL-MCNC: 10 MG/DL (ref 8.5–10.5)
CHLORIDE SERPL-SCNC: 102 MEQ/L (ref 98–111)
CO2 SERPL-SCNC: 24 MEQ/L (ref 23–33)
CREAT SERPL-MCNC: 1.1 MG/DL (ref 0.4–1.2)
DEPRECATED RDW RBC AUTO: 40.5 FL (ref 35–45)
EKG ATRIAL RATE: 64 BPM
EKG P AXIS: 50 DEGREES
EKG P-R INTERVAL: 216 MS
EKG Q-T INTERVAL: 400 MS
EKG QRS DURATION: 92 MS
EKG QTC CALCULATION (BAZETT): 412 MS
EKG R AXIS: 77 DEGREES
EKG T AXIS: 14 DEGREES
EKG VENTRICULAR RATE: 64 BPM
EOSINOPHIL NFR BLD AUTO: 1.5 %
EOSINOPHILS ABSOLUTE: 0.1 THOU/MM3 (ref 0–0.4)
ERYTHROCYTE [DISTWIDTH] IN BLOOD BY AUTOMATED COUNT: 12.5 % (ref 11.5–14.5)
GFR SERPL CREATININE-BSD FRML MDRD: 82 ML/MIN/1.73M2
GLUCOSE SERPL-MCNC: 89 MG/DL (ref 70–108)
HCT VFR BLD AUTO: 41.6 % (ref 42–52)
HGB BLD-MCNC: 14.6 GM/DL (ref 14–18)
IMM GRANULOCYTES # BLD AUTO: 0.01 THOU/MM3 (ref 0–0.07)
IMM GRANULOCYTES NFR BLD AUTO: 0.3 %
LYMPHOCYTES ABSOLUTE: 0.8 THOU/MM3 (ref 1–4.8)
LYMPHOCYTES NFR BLD AUTO: 19.6 %
MCH RBC QN AUTO: 31.3 PG (ref 26–33)
MCHC RBC AUTO-ENTMCNC: 35.1 GM/DL (ref 32.2–35.5)
MCV RBC AUTO: 89.1 FL (ref 80–94)
MONOCYTES ABSOLUTE: 0.4 THOU/MM3 (ref 0.4–1.3)
MONOCYTES NFR BLD AUTO: 10.4 %
NEUTROPHILS ABSOLUTE: 2.6 THOU/MM3 (ref 1.8–7.7)
NEUTROPHILS NFR BLD AUTO: 66.9 %
NRBC BLD AUTO-RTO: 0 /100 WBC
PLATELET # BLD AUTO: 324 THOU/MM3 (ref 130–400)
PMV BLD AUTO: 9.9 FL (ref 9.4–12.4)
POTASSIUM SERPL-SCNC: 4.3 MEQ/L (ref 3.5–5.2)
PROT SERPL-MCNC: 7.8 G/DL (ref 6.1–8)
RBC # BLD AUTO: 4.67 MILL/MM3 (ref 4.7–6.1)
SODIUM SERPL-SCNC: 139 MEQ/L (ref 135–145)
WBC # BLD AUTO: 3.9 THOU/MM3 (ref 4.8–10.8)

## 2025-01-27 PROCEDURE — 80053 COMPREHEN METABOLIC PANEL: CPT

## 2025-01-27 PROCEDURE — 71046 X-RAY EXAM CHEST 2 VIEWS: CPT

## 2025-01-27 PROCEDURE — 85025 COMPLETE CBC W/AUTO DIFF WBC: CPT

## 2025-01-27 PROCEDURE — 36415 COLL VENOUS BLD VENIPUNCTURE: CPT

## 2025-01-27 PROCEDURE — 93005 ELECTROCARDIOGRAM TRACING: CPT

## 2025-01-29 DIAGNOSIS — G47.00 INSOMNIA, UNSPECIFIED TYPE: ICD-10-CM

## 2025-01-29 RX ORDER — ASPIRIN 81 MG/1
81 TABLET ORAL DAILY
COMMUNITY

## 2025-01-29 RX ORDER — ZOLPIDEM TARTRATE 10 MG/1
10 TABLET ORAL NIGHTLY PRN
Qty: 30 TABLET | Refills: 5 | OUTPATIENT
Start: 2025-01-29 | End: 2025-07-28

## 2025-01-29 NOTE — PROGRESS NOTES
PAT call attempted, patient unavailable, left message to please call us back at your earliest convenience; [phone]

## 2025-01-30 ENCOUNTER — PREP FOR PROCEDURE (OUTPATIENT)
Dept: ENT CLINIC | Age: 50
End: 2025-01-30

## 2025-02-05 ENCOUNTER — PATIENT MESSAGE (OUTPATIENT)
Dept: ENT CLINIC | Age: 50
End: 2025-02-05

## 2025-02-05 RX ORDER — SODIUM CHLORIDE 0.9 % (FLUSH) 0.9 %
5-40 SYRINGE (ML) INJECTION EVERY 12 HOURS SCHEDULED
Status: CANCELLED | OUTPATIENT
Start: 2025-02-05

## 2025-02-05 RX ORDER — SODIUM CHLORIDE 0.9 % (FLUSH) 0.9 %
5-40 SYRINGE (ML) INJECTION PRN
Status: CANCELLED | OUTPATIENT
Start: 2025-02-05

## 2025-02-05 RX ORDER — SODIUM CHLORIDE 9 MG/ML
INJECTION, SOLUTION INTRAVENOUS PRN
Status: CANCELLED | OUTPATIENT
Start: 2025-02-05

## 2025-02-05 NOTE — H&P
Adapted from prior ENT note:    Refractory nasal obstruction; Chronic maxillary sinusitis; Inferior turbinate hypertrophy; Right maxillary sinus cyst; Right trigeminal neuralgia  No new symptoms    Past Medical History:   Diagnosis Date    Anxiety     CAD (coronary artery disease)     Hyperlipidemia     Hypertension     MI, old 2019    Sinobronchitis 01/18/2024    Thyroid disease        Past Surgical History:   Procedure Laterality Date    CORONARY ANGIOPLASTY WITH STENT PLACEMENT  2019    x3    PTCA      SINUS ENDOSCOPY N/A 06/09/2021    IGS NASAL ENDOSCOPY WITH PARTIAL RESECTION B[place] BULLOSA RIGHT MIDDLE TURBINATE, PARTIAL ANTERIOR ETHMOID MAXILLARY ANTROSTOMY WITH REMOVAL OF TISSUE FROM MAXILLARY SINUS RIGHT, MAXILLARY ANTROSTOMY LEFT, SEPTOPLASTY SUBMUCOUS RESECT TURBINATES PARTIAL RIGHT INFERIOR TURBINATE performed by Fred Lima MD at [facility] OR    WISDOM TOOTH EXTRACTION         Allergies   Allergen Reactions    Statins Other (See Comments)     Leg cramps and joint pain       Current Facility-Administered Medications   Medication Dose Route Frequency Provider Last Rate Last Admin    dexAMETHasone (PF) (DECADRON) injection 10 mg  10 mg IntraVENous Once Dunifon, Crystal L, APRN - CNP        piperacillin-tazobactam (ZOSYN) 3,375 mg in sodium chloride 0.9 % 50 mL IVPB (mini-bag)  3,375 mg IntraVENous Once Dunifon, Crystal L, APRN - CNP        sodium chloride flush 0.9 % injection 5-40 mL  5-40 mL IntraVENous 2 times per day Dunifon, Crystal L, APRN - CNP        sodium chloride flush 0.9 % injection 5-40 mL  5-40 mL IntraVENous PRN Dunifon, Crystal L, APRN - CNP        0.9 % sodium chloride infusion   IntraVENous PRN Dunifon, Crystal L, APRN - CNP           Current vitals  /60   Pulse 82   Temp 96.9 °F (36.1 °C) (Tympanic)   Resp 20   Ht 1.778 m (5' 10\")   Wt 91.7 kg (202 lb 3.2 oz)   SpO2 97%   BMI 29.01 kg/m²     Proceed with original surgical plan:  IGS - HAGAN VIR[place]IE PROCEDURE, Sinusotomy,     Hyperlipidemia      Hypertension      MI, old 2019    Sinobronchitis 01/18/2024    Thyroid disease           Past Surgical History         Past Surgical History:   Procedure Laterality Date    CORONARY ANGIOPLASTY WITH STENT PLACEMENT   2019     x3    PTCA        SINUS ENDOSCOPY N/A 06/09/2021     IGS NASAL ENDOSCOPY WITH PARTIAL RESECTION B[place] BULLOSA RIGHT MIDDLE TURBINATE, PARTIAL ANTERIOR ETHMOID MAXILLARY ANTROSTOMY WITH REMOVAL OF TISSUE FROM MAXILLARY SINUS RIGHT, MAXILLARY ANTROSTOMY LEFT, SEPTOPLASTY SUBMUCOUS RESECT TURBINATES PARTIAL RIGHT INFERIOR TURBINATE performed [facility]    WISDOM TOOTH EXTRACTION             Family History         Family History   Problem Relation Age of Onset    Heart Disease Mother      High Blood Pressure Mother      Stroke Mother      Coronary Art Dis Mother      High Cholesterol Mother      High Blood Pressure Father      Cancer Sister      Arthritis Sister      Diabetes Neg Hx           Social History            Tobacco Use    Smoking status: Never       Passive exposure: Never    Smokeless tobacco: Never   Substance Use Topics    Alcohol use: Yes       Alcohol/week: 10.0 standard drinks of alcohol       Types: 5 Cans of beer, 5 Drinks containing 0.5 oz of alcohol per week       Comment: few drinks a few days                    Subjective:      Review of Systems  Rest of review of systems are negative, except as noted in HPI.      Objective:      /76 (Site: Left Upper Arm, Position: Sitting)   Pulse 77   Temp 97.5 °F (36.4 °C) (Temporal)   Resp 18   Ht 1.778 m (5' 10\")   Wt 91.1 kg (200 lb 12.8 oz)   SpO2 96%   BMI 28.81 kg/m²      Physical Exam         On general physical exam I found the patient to be a pleasant reasonable cooperative well-appearing adult male in no acute distress.  His voice and speech pattern are within normal limits for his age and gender.  He tends to breathe through a closed mouth.  His wife thought that the

## 2025-02-05 NOTE — TELEPHONE ENCOUNTER
Moises Paez      Good morning. I have surgery scheduled with Dr. Vanegas and when I did my pre-surgery phone call last week, they did not have a definitive answer of when I needed to be there, what time surgery was scheduled for, how long surgery was to be expected to take, etc. So, if you could send me any of the information, especially when I should arrive and what time surgery is, that would help to coordinate with my wife and family. Thank you.

## 2025-02-06 ENCOUNTER — ANESTHESIA EVENT (OUTPATIENT)
Dept: OPERATING ROOM | Age: 50
End: 2025-02-06
Payer: COMMERCIAL

## 2025-02-06 ENCOUNTER — HOSPITAL ENCOUNTER (OUTPATIENT)
Age: 50
Setting detail: OUTPATIENT SURGERY
Discharge: HOME | End: 2025-02-06
Attending: OTOLARYNGOLOGY | Admitting: OTOLARYNGOLOGY
Payer: COMMERCIAL

## 2025-02-06 ENCOUNTER — ANESTHESIA (OUTPATIENT)
Dept: OPERATING ROOM | Age: 50
End: 2025-02-06
Payer: COMMERCIAL

## 2025-02-06 VITALS
WEIGHT: 202.2 LBS | DIASTOLIC BLOOD PRESSURE: 92 MMHG | TEMPERATURE: 97.4 F | HEART RATE: 75 BPM | SYSTOLIC BLOOD PRESSURE: 139 MMHG | HEIGHT: 70 IN | RESPIRATION RATE: 15 BRPM | BODY MASS INDEX: 28.95 KG/M2 | OXYGEN SATURATION: 98 %

## 2025-02-06 DIAGNOSIS — J34.3 HYPERTROPHY OF POSTERIOR END OF INFERIOR TURBINATE: ICD-10-CM

## 2025-02-06 DIAGNOSIS — J34.89 REFRACTORY OBSTRUCTION OF NASAL AIRWAY: ICD-10-CM

## 2025-02-06 DIAGNOSIS — J32.0 CHRONIC MAXILLARY SINUSITIS: ICD-10-CM

## 2025-02-06 DIAGNOSIS — G89.18 ACUTE POST-OPERATIVE PAIN: Primary | ICD-10-CM

## 2025-02-06 PROBLEM — J34.1 MUCOUS RETENTION CYST OF MAXILLARY SINUS: Status: ACTIVE | Noted: 2025-02-06

## 2025-02-06 LAB — FUNGUS SPEC FUNGUS STN: NORMAL

## 2025-02-06 PROCEDURE — 6370000000 HC RX 637 (ALT 250 FOR IP): Performed by: OTOLARYNGOLOGY

## 2025-02-06 PROCEDURE — APPNB45 APP NON BILLABLE 31-45 MINUTES: Performed by: NURSE PRACTITIONER

## 2025-02-06 PROCEDURE — 6360000002 HC RX W HCPCS: Performed by: NURSE PRACTITIONER

## 2025-02-06 PROCEDURE — 6370000000 HC RX 637 (ALT 250 FOR IP): Performed by: REGISTERED NURSE

## 2025-02-06 PROCEDURE — 2500000003 HC RX 250 WO HCPCS: Performed by: REGISTERED NURSE

## 2025-02-06 PROCEDURE — 87070 CULTURE OTHR SPECIMN AEROBIC: CPT

## 2025-02-06 PROCEDURE — 2720000010 HC SURG SUPPLY STERILE: Performed by: OTOLARYNGOLOGY

## 2025-02-06 PROCEDURE — 2580000003 HC RX 258: Performed by: OTOLARYNGOLOGY

## 2025-02-06 PROCEDURE — 87205 SMEAR GRAM STAIN: CPT

## 2025-02-06 PROCEDURE — 6360000002 HC RX W HCPCS: Performed by: REGISTERED NURSE

## 2025-02-06 PROCEDURE — 2709999900 HC NON-CHARGEABLE SUPPLY: Performed by: OTOLARYNGOLOGY

## 2025-02-06 PROCEDURE — 7100000010 HC PHASE II RECOVERY - FIRST 15 MIN: Performed by: OTOLARYNGOLOGY

## 2025-02-06 PROCEDURE — 7100000000 HC PACU RECOVERY - FIRST 15 MIN: Performed by: OTOLARYNGOLOGY

## 2025-02-06 PROCEDURE — 3600000004 HC SURGERY LEVEL 4 BASE: Performed by: OTOLARYNGOLOGY

## 2025-02-06 PROCEDURE — 3700000001 HC ADD 15 MINUTES (ANESTHESIA): Performed by: OTOLARYNGOLOGY

## 2025-02-06 PROCEDURE — 3600000014 HC SURGERY LEVEL 4 ADDTL 15MIN: Performed by: OTOLARYNGOLOGY

## 2025-02-06 PROCEDURE — 88305 TISSUE EXAM BY PATHOLOGIST: CPT

## 2025-02-06 PROCEDURE — 7100000001 HC PACU RECOVERY - ADDTL 15 MIN: Performed by: OTOLARYNGOLOGY

## 2025-02-06 PROCEDURE — 6360000002 HC RX W HCPCS: Performed by: OTOLARYNGOLOGY

## 2025-02-06 PROCEDURE — 2580000003 HC RX 258: Performed by: NURSE PRACTITIONER

## 2025-02-06 PROCEDURE — 3700000000 HC ANESTHESIA ATTENDED CARE: Performed by: OTOLARYNGOLOGY

## 2025-02-06 PROCEDURE — 7100000011 HC PHASE II RECOVERY - ADDTL 15 MIN: Performed by: OTOLARYNGOLOGY

## 2025-02-06 PROCEDURE — 87102 FUNGUS ISOLATION CULTURE: CPT

## 2025-02-06 PROCEDURE — 87147 CULTURE TYPE IMMUNOLOGIC: CPT

## 2025-02-06 RX ORDER — ONDANSETRON 2 MG/ML
4 INJECTION INTRAMUSCULAR; INTRAVENOUS
Status: DISCONTINUED | OUTPATIENT
Start: 2025-02-06 | End: 2025-02-06 | Stop reason: HOSPADM

## 2025-02-06 RX ORDER — HYDROCODONE BITARTRATE AND ACETAMINOPHEN 5; 325 MG/1; MG/1
1 TABLET ORAL ONCE
Status: COMPLETED | OUTPATIENT
Start: 2025-02-06 | End: 2025-02-06

## 2025-02-06 RX ORDER — HYDROCODONE BITARTRATE AND ACETAMINOPHEN 5; 325 MG/1; MG/1
1 TABLET ORAL EVERY 6 HOURS PRN
Qty: 28 TABLET | Refills: 0 | Status: SHIPPED | OUTPATIENT
Start: 2025-02-06 | End: 2025-02-13

## 2025-02-06 RX ORDER — SODIUM CHLORIDE 0.9 % (FLUSH) 0.9 %
5-40 SYRINGE (ML) INJECTION EVERY 12 HOURS SCHEDULED
Status: DISCONTINUED | OUTPATIENT
Start: 2025-02-06 | End: 2025-02-06 | Stop reason: HOSPADM

## 2025-02-06 RX ORDER — MIDAZOLAM HYDROCHLORIDE 1 MG/ML
INJECTION, SOLUTION INTRAMUSCULAR; INTRAVENOUS
Status: DISCONTINUED | OUTPATIENT
Start: 2025-02-06 | End: 2025-02-06 | Stop reason: SDUPTHER

## 2025-02-06 RX ORDER — NALOXONE HYDROCHLORIDE 0.4 MG/ML
INJECTION, SOLUTION INTRAMUSCULAR; INTRAVENOUS; SUBCUTANEOUS PRN
Status: DISCONTINUED | OUTPATIENT
Start: 2025-02-06 | End: 2025-02-06 | Stop reason: HOSPADM

## 2025-02-06 RX ORDER — OXYMETAZOLINE HYDROCHLORIDE 0.05 G/100ML
SPRAY NASAL PRN
Status: DISCONTINUED | OUTPATIENT
Start: 2025-02-06 | End: 2025-02-06 | Stop reason: ALTCHOICE

## 2025-02-06 RX ORDER — ONDANSETRON 2 MG/ML
INJECTION INTRAMUSCULAR; INTRAVENOUS
Status: DISCONTINUED | OUTPATIENT
Start: 2025-02-06 | End: 2025-02-06 | Stop reason: SDUPTHER

## 2025-02-06 RX ORDER — FENTANYL CITRATE 50 UG/ML
INJECTION, SOLUTION INTRAMUSCULAR; INTRAVENOUS
Status: DISCONTINUED | OUTPATIENT
Start: 2025-02-06 | End: 2025-02-06 | Stop reason: SDUPTHER

## 2025-02-06 RX ORDER — DEXAMETHASONE SODIUM PHOSPHATE 10 MG/ML
10 INJECTION, EMULSION INTRAMUSCULAR; INTRAVENOUS ONCE
Status: COMPLETED | OUTPATIENT
Start: 2025-02-06 | End: 2025-02-06

## 2025-02-06 RX ORDER — ROCURONIUM BROMIDE 10 MG/ML
INJECTION, SOLUTION INTRAVENOUS
Status: DISCONTINUED | OUTPATIENT
Start: 2025-02-06 | End: 2025-02-06 | Stop reason: SDUPTHER

## 2025-02-06 RX ORDER — MEPERIDINE HYDROCHLORIDE 25 MG/ML
12.5 INJECTION INTRAMUSCULAR; INTRAVENOUS; SUBCUTANEOUS EVERY 5 MIN PRN
Status: DISCONTINUED | OUTPATIENT
Start: 2025-02-06 | End: 2025-02-06 | Stop reason: HOSPADM

## 2025-02-06 RX ORDER — PROPOFOL 10 MG/ML
INJECTION, EMULSION INTRAVENOUS
Status: DISCONTINUED | OUTPATIENT
Start: 2025-02-06 | End: 2025-02-06 | Stop reason: SDUPTHER

## 2025-02-06 RX ORDER — SODIUM CHLORIDE 0.9 % (FLUSH) 0.9 %
5-40 SYRINGE (ML) INJECTION PRN
Status: DISCONTINUED | OUTPATIENT
Start: 2025-02-06 | End: 2025-02-06 | Stop reason: HOSPADM

## 2025-02-06 RX ORDER — FENTANYL CITRATE 50 UG/ML
50 INJECTION, SOLUTION INTRAMUSCULAR; INTRAVENOUS EVERY 5 MIN PRN
Status: DISCONTINUED | OUTPATIENT
Start: 2025-02-06 | End: 2025-02-06 | Stop reason: HOSPADM

## 2025-02-06 RX ORDER — SODIUM CHLORIDE 9 MG/ML
INJECTION, SOLUTION INTRAVENOUS PRN
Status: DISCONTINUED | OUTPATIENT
Start: 2025-02-06 | End: 2025-02-06 | Stop reason: HOSPADM

## 2025-02-06 RX ORDER — LIDOCAINE HYDROCHLORIDE 40 MG/ML
SOLUTION TOPICAL
Status: DISCONTINUED | OUTPATIENT
Start: 2025-02-06 | End: 2025-02-06 | Stop reason: SDUPTHER

## 2025-02-06 RX ORDER — CIPROFLOXACIN 500 MG/1
500 TABLET, FILM COATED ORAL 2 TIMES DAILY
Qty: 20 TABLET | Refills: 0 | Status: SHIPPED | OUTPATIENT
Start: 2025-02-06 | End: 2025-02-16

## 2025-02-06 RX ADMIN — PROPOFOL 200 MG: 10 INJECTION, EMULSION INTRAVENOUS at 07:57

## 2025-02-06 RX ADMIN — ONDANSETRON 4 MG: 2 INJECTION INTRAMUSCULAR; INTRAVENOUS at 10:03

## 2025-02-06 RX ADMIN — FENTANYL CITRATE 50 MCG: 50 INJECTION, SOLUTION INTRAMUSCULAR; INTRAVENOUS at 08:44

## 2025-02-06 RX ADMIN — ROCURONIUM BROMIDE 50 MG: 10 INJECTION INTRAVENOUS at 07:57

## 2025-02-06 RX ADMIN — LIDOCAINE HYDROCHLORIDE 4 ML: 40 SOLUTION TOPICAL at 07:57

## 2025-02-06 RX ADMIN — FENTANYL CITRATE 50 MCG: 50 INJECTION, SOLUTION INTRAMUSCULAR; INTRAVENOUS at 10:03

## 2025-02-06 RX ADMIN — PIPERACILLIN SODIUM AND TAZOBACTAM SODIUM 3375 MG: 3; .375 INJECTION, POWDER, LYOPHILIZED, FOR SOLUTION INTRAVENOUS at 09:45

## 2025-02-06 RX ADMIN — ROCURONIUM BROMIDE 10 MG: 10 INJECTION INTRAVENOUS at 09:40

## 2025-02-06 RX ADMIN — Medication 30 MG: at 07:57

## 2025-02-06 RX ADMIN — SODIUM CHLORIDE: 9 INJECTION, SOLUTION INTRAVENOUS at 07:27

## 2025-02-06 RX ADMIN — FENTANYL CITRATE 50 MCG: 50 INJECTION, SOLUTION INTRAMUSCULAR; INTRAVENOUS at 07:57

## 2025-02-06 RX ADMIN — DEXAMETHASONE SODIUM PHOSPHATE 10 MG: 10 INJECTION, EMULSION INTRAMUSCULAR; INTRAVENOUS at 07:28

## 2025-02-06 RX ADMIN — ROCURONIUM BROMIDE 10 MG: 10 INJECTION INTRAVENOUS at 08:44

## 2025-02-06 RX ADMIN — MIDAZOLAM 2 MG: 1 INJECTION INTRAMUSCULAR; INTRAVENOUS at 07:54

## 2025-02-06 RX ADMIN — Medication 20 MG: at 09:08

## 2025-02-06 RX ADMIN — PIPERACILLIN SODIUM AND TAZOBACTAM SODIUM 3375 MG: 3; .375 INJECTION, POWDER, LYOPHILIZED, FOR SOLUTION INTRAVENOUS at 08:04

## 2025-02-06 RX ADMIN — FENTANYL CITRATE 50 MCG: 50 INJECTION, SOLUTION INTRAMUSCULAR; INTRAVENOUS at 09:40

## 2025-02-06 RX ADMIN — HYDROCODONE BITARTRATE AND ACETAMINOPHEN 1 TABLET: 5; 325 TABLET ORAL at 11:24

## 2025-02-06 RX ADMIN — SUGAMMADEX 200 MG: 100 INJECTION, SOLUTION INTRAVENOUS at 10:03

## 2025-02-06 ASSESSMENT — PAIN DESCRIPTION - PAIN TYPE
TYPE: SURGICAL PAIN

## 2025-02-06 ASSESSMENT — PAIN - FUNCTIONAL ASSESSMENT: PAIN_FUNCTIONAL_ASSESSMENT: 0-10

## 2025-02-06 ASSESSMENT — PAIN DESCRIPTION - ONSET
ONSET: ON-GOING

## 2025-02-06 ASSESSMENT — PAIN DESCRIPTION - DESCRIPTORS
DESCRIPTORS: ACHING

## 2025-02-06 ASSESSMENT — PAIN DESCRIPTION - LOCATION
LOCATION: THROAT

## 2025-02-06 ASSESSMENT — PAIN SCALES - GENERAL
PAINLEVEL_OUTOF10: 4
PAINLEVEL_OUTOF10: 5
PAINLEVEL_OUTOF10: 5

## 2025-02-06 ASSESSMENT — PAIN SCALES - WONG BAKER: WONGBAKER_NUMERICALRESPONSE: NO HURT

## 2025-02-06 NOTE — ANESTHESIA POSTPROCEDURE EVALUATION
Department of Anesthesiology  Postprocedure Note    Patient: Moises Paez  MRN: [id number]  YOB: 1975  Date of evaluation: 2/6/2025    Procedure Summary       Date: 02/06/25 Room / Location: [facility] OR 01 / [facility] OR    Anesthesia Start: 0754 Anesthesia Stop: 1016    Procedures:       IGS - HAGAN VIRGINIE PROCEDURE, Sinusotomy, Maxillary, intranasal, radical, with removal of polyps - Right (Right)      Maxillary Antrostomy with removal of tissue - Right INFERIOR TURBINATE REDUCTION - Right (Right: Nose) Diagnosis:       Chronic maxillary sinusitis      Hypertrophy of posterior end of inferior turbinate      Refractory obstruction of nasal airway      (Chronic maxillary sinusitis [J32.0])      (Hypertrophy of posterior end of inferior turbinate [J34.3])      (Refractory obstruction of nasal airway [J34.89])    Surgeons: Feliz Vanegas MD Responsible Provider: Ashish Fairchild DO    Anesthesia Type: general ASA Status: 3            Anesthesia Type: No value filed.    Reyes Phase I: Reyes Score: 10    Reyes Phase II: Reyes Score: 10    Anesthesia Post Evaluation    Patient location during evaluation: PACU  Patient participation: complete - patient participated  Level of consciousness: awake and alert  Pain score: 4  Airway patency: patent  Nausea & Vomiting: no nausea and no vomiting  Cardiovascular status: hemodynamically stable and blood pressure returned to baseline  Respiratory status: spontaneous ventilation, acceptable and nasal cannula  Hydration status: stable  Pain management: adequate and satisfactory to patient    No notable events documented.

## 2025-02-06 NOTE — DISCHARGE INSTRUCTIONS
Instructions specific for  from Dr. Feliz Vanegas:  1.  Rest; sleep propped up or in an easy chair for the next 3 nights  2.  No nose blowing whatsoever for 10 days; distinct risk for blowing air and infected mucus into your orbit.  If you feel a sneeze coming on, press your hand on the outside of your upper lip and cheek to help brace that area and prevent air from escaping.   3.  Begin rinsing your sinuses with saline on Saturday.  Rinse from the right nostril over to the left gently at least twice daily with [employer]Med saline rinse bottle system using distilled water and salt packet.   4.  Change nasal drip pad twice a day and as needed for saturation.  5.  Brisk bleeding is unlikely but should it occur lean forward to allow it to drain out of your nose into a container so the amount you bleed can be quantified.  This will also protect you from swallowing the blood which will make you sick and potentially complicate your care if you need anesthesia to undergo a cautery procedure.  If this occurs and is persistent, come to the emergency room and have me or one of my partners called.  6.  Continue the Aspirin daily  7.  May restart Brilinta on Monday    For emergencies:  Feliz Vanegas MD  Cell: [phone]

## 2025-02-06 NOTE — PROGRESS NOTES
Pt returned to [facility] room 10. Vitals and assessment as charted. 0.9 infusing, to count from PACU. Pt has crackers and water. Family at the bedside. Pt and family verbalized understanding of discharge criteria and call light use. Call light in reach.

## 2025-02-06 NOTE — PROGRESS NOTES
1009 Pt arrives to pacu with oral airway and simple mask. Pt minimally responsive to stimuli at this time. Respirations easy and unlabored. VSS.   1011 Pt starting to wake up. Oral airway and simple mask removed. Pt following commands.   Pt denies any pain or nausea.   1030 Pt placed on 2L NC. Encouraged pt to cough and deep breathe. Pt educated on how to use incentive spirometer.   1039 Pt meets criteria for discharge from pacu. Pt transported to [facility] in stable condition.   1043 Report given to Renetta NG. All questions and concerns addressed at this time.

## 2025-02-06 NOTE — ANESTHESIA PRE PROCEDURE
Department of Anesthesiology  Preprocedure Note       Name:  Moises Paez   Age:  49 y.o.  :  1975                                          MRN:  [id number]         Date:  2025      Surgeon: Surgeon(s):  Feliz Vanegas MD    Procedure: Procedure(s):  IGS - HAGAN VIR[place]IE PROCEDURE, Sinusotomy, Maxillary, intranasal, radical, with removal of polyps - Right  Maxillary Antrostomy without removal of tissue - Right INFERIOR TURBINATE REDUCTION - Right    Medications prior to admission:   Prior to Admission medications    Medication Sig Start Date End Date Taking? Authorizing Provider   aspirin 81 MG EC tablet Take 1 tablet by mouth daily   Yes Suad Gibbs MD   losartan-hydroCHLOROthiazide (HYZAAR) 50-12.5 MG per tablet take 1 tablet by mouth once daily 25  Yes Ford Trevizo MD   ticagrelor (BRILINTA) 90 MG TABS tablet take 1 tablet by mouth every morning and 1 tablet at bedtime 24  Yes Ford Trevizo MD   carvedilol (COREG) 6.25 MG tablet Take 1 tablet by mouth 2 times daily (with meals) 24  Yes Ford Trevizo MD   levothyroxine (SYNTHROID) 50 MCG tablet take 1 tablet by mouth once daily 24  Yes Ford Trevizo MD   fenofibrate (TRICOR) 145 MG tablet Take 1 tablet by mouth daily 24  Yes Mikey Tellez MD   REPATHA SURECLICK 140 MG/ML SOAJ ADMINISTER 1 ML UNDER THE SKIN EVERY 14 DAYS  Patient taking differently: Inject as directed every 14 days 12/10/24  Yes Mikey Tellez MD   zolpidem (AMBIEN) 10 MG tablet Take 1 tablet by mouth nightly as needed for Sleep for up to 180 days. Max Daily Amount: 10 mg 11/4/24 5/3/25 Yes Ford Trevizo MD   pantoprazole (PROTONIX) 40 MG tablet Take 1 tablet by mouth in the morning and at bedtime 24   Ford Trevizo MD   Omega-3 Fatty Acids (FISH OIL) 1000 MG CAPS Take 2 capsules by mouth 2 times daily    ProviderSuad MD   Coenzyme Q10 (CO Q-10) 200 MG CAPS Take by mouth daily  Patient not taking: Reported

## 2025-02-06 NOTE — OP NOTE
Operative Note      Patient: Moises Paez  YOB: 1975  MRN: [id number]    Date of Procedure: 2/6/2025    Pre-Op Diagnosis Codes:      * Chronic maxillary sinusitis [J32.0]     * Hypertrophy of posterior end of inferior turbinate [J34.3]     * Refractory obstruction of nasal airway [J34.89]  Right maxillary inclusion cyst    Post-Op Diagnosis: Same       Procedure(s):  IGS - HAGAN VIR[place]IE PROCEDURE, Sinusotomy, Maxillary, intranasal, radical, with removal of polyps - Right  Maxillary Antrostomy with removal of tissue - Right INFERIOR TURBINATE REDUCTION - Right    Surgeon(s):  Feliz Vanegas MD    Assistant:   * No surgical staff found *    Anesthesia: General    Estimated Blood Loss (mL): less than 50     Complications: None    Specimens:   ID Type Source Tests Collected by Time Destination   1 : RIGHT MAXILLARY SINUS PUS Body Fluid Sinus CULTURE, FUNGUS, GRAM STAIN (Canceled), CULTURE, AEROBIC Feliz Vanegas MD 2/6/2025 0842    A : right maxillary antrostomy Tissue Sinus SURGICAL PATHOLOGY Feliz Vanegas MD 2/6/2025 0844    B : RIGHT INFERIOR TURBINATE Tissue Sinus SURGICAL PATHOLOGY Feliz Vanegas MD 2/6/2025 0853    C : Right Maxillary Sinus Retention Cyst Tissue Sinus SURGICAL PATHOLOGY Feliz Vanegas MD 2/6/2025 0940        Implants:  * No implants in log *      Drains: * No LDAs found *    Findings:  Infection Present At Time Of Surgery (PATOS) (choose all levels that have infection present):  - Superficial Infection (skin/subcutaneous) present as evidenced by pus of the middle meatus on the right side; cultures taken  Other Findings: 1.  Clear signs of chronic rhinosinusitis seen in the right middle meatus and right maxillary sinus ostium prior to maxillary antrostomy being performed; cultures taken and Luken's trap for Gram stain and sensitivities  2.  Maxillary antrostomy performed with navigation for accurate entry into the maxillary sinus; maxillary inclusion cyst removed

## 2025-02-06 NOTE — PROGRESS NOTES
Pt admitted to [facility] room 10 and oriented to unit. SCD sleeves applied. Nares swabbed. Pt verbalized permission for first name, last initial and physicians name on white board. SDS board and discharge criteria explained, pt and family verbalized understanding. Pt denies thoughts of harming self or others. Call light in reach. Family at the bedside.  Mellissa [phone]

## 2025-02-08 LAB
BACTERIA SPEC AEROBE CULT: NORMAL
GRAM STN SPEC: NORMAL

## 2025-02-10 LAB
FUNGUS SPEC CULT: NORMAL
FUNGUS SPEC FUNGUS STN: NORMAL

## 2025-02-17 ENCOUNTER — OFFICE VISIT (OUTPATIENT)
Dept: ENT CLINIC | Age: 50
End: 2025-02-17

## 2025-02-17 VITALS
DIASTOLIC BLOOD PRESSURE: 80 MMHG | SYSTOLIC BLOOD PRESSURE: 138 MMHG | RESPIRATION RATE: 18 BRPM | OXYGEN SATURATION: 96 % | HEART RATE: 110 BPM | HEIGHT: 70 IN | WEIGHT: 199.4 LBS | BODY MASS INDEX: 28.55 KG/M2 | TEMPERATURE: 98.3 F

## 2025-02-17 DIAGNOSIS — J34.1 MUCOUS RETENTION CYST OF MAXILLARY SINUS: ICD-10-CM

## 2025-02-17 DIAGNOSIS — Z98.890 STATUS POST FUNCTIONAL ENDOSCOPIC SINUS SURGERY (FESS): Primary | ICD-10-CM

## 2025-02-17 DIAGNOSIS — J34.89 REFRACTORY OBSTRUCTION OF NASAL AIRWAY: ICD-10-CM

## 2025-02-17 RX ORDER — HYDROCODONE BITARTRATE AND ACETAMINOPHEN 5; 300 MG/1; MG/1
TABLET ORAL
COMMUNITY
Start: 2025-02-06

## 2025-02-19 PROBLEM — Z98.890 STATUS POST FUNCTIONAL ENDOSCOPIC SINUS SURGERY (FESS): Status: ACTIVE | Noted: 2025-02-19

## 2025-02-19 NOTE — PROGRESS NOTES
[facility] [place] [facility]  [facility] EAR, NOSE AND THROAT  [address]  [address]  [facility] [address]  Dept: [phone]  Dept Fax: [phone]  Loc: [phone]    Moises Paez is a 49 y.o. male who was referred by No ref. provider found for:  Chief Complaint   Patient presents with   • Post-Op Check     Post op.  Right IGS maxillary, inferior turb 2/6.  He reports intense headache, stiffness in right side of jaw.   He is taking half doses of pain meds along alternating Tylenol.  He thinks the last of the stitches came out.       HPI:     Moises Paez is a 49 y.o. male   History of Present Illness  The patient presents for his first postoperative visit following a Rai-Gasper approach to the maxillary sinus, coupled with an endoscopic approach, to ensure the complete removal of a large maxillary sinus retention cyst.    He reports experiencing significant pain, predominantly localized in his jaw and extending to the upper row of teeth on the affected side. He also continues to suffer from headaches, primarily confined to the right side. The most intense pain is described as being in the jaw, with severe headaches occurring almost daily. He notes that the final suture was dislodged during tooth brushing this morning. Additionally, he mentions facial swelling and a black eye, which he attributes to frequent pressure application on the affected side for pain relief. The onset of the black eye was sudden, appearing on Monday following surgery on the previous Thursday. He has been performing saline rinses twice daily, initially resulting in significant flush out, but the last 4 or 5 days have yielded clear results. He has one remaining dose of antibiotics and reports no adverse reactions to the medication. He expresses a preference for avoiding nasal sprays due to associated nausea and a general aversion to taking pain medication.    SOCIAL HISTORY  He is a school district

## 2025-03-04 PROBLEM — G89.18 ACUTE POST-OPERATIVE PAIN: Status: ACTIVE | Noted: 2025-03-04

## 2025-03-05 ENCOUNTER — OFFICE VISIT (OUTPATIENT)
Dept: ENT CLINIC | Age: 50
End: 2025-03-05

## 2025-03-05 VITALS
WEIGHT: 201.8 LBS | HEART RATE: 74 BPM | SYSTOLIC BLOOD PRESSURE: 122 MMHG | DIASTOLIC BLOOD PRESSURE: 74 MMHG | BODY MASS INDEX: 28.89 KG/M2 | OXYGEN SATURATION: 97 % | TEMPERATURE: 98.5 F | RESPIRATION RATE: 18 BRPM | HEIGHT: 70 IN

## 2025-03-05 DIAGNOSIS — Z98.890 STATUS POST FUNCTIONAL ENDOSCOPIC SINUS SURGERY (FESS): ICD-10-CM

## 2025-03-05 DIAGNOSIS — J34.89 REFRACTORY OBSTRUCTION OF NASAL AIRWAY: ICD-10-CM

## 2025-03-05 DIAGNOSIS — J32.0 CHRONIC MAXILLARY SINUSITIS: ICD-10-CM

## 2025-03-05 DIAGNOSIS — J34.1 MUCOUS RETENTION CYST OF MAXILLARY SINUS: Primary | ICD-10-CM

## 2025-03-05 PROCEDURE — 99024 POSTOP FOLLOW-UP VISIT: CPT | Performed by: OTOLARYNGOLOGY

## 2025-03-05 NOTE — PROGRESS NOTES
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       Return in about 10 weeks (around 5/14/2025) for Follow-up evaluation and review of new CT scan.         The patient (or guardian, if applicable) and other individuals in attendance with the patient were advised that Artificial Intelligence will be utilized during this visit to record, process the conversation to generate a clinical note, and support improvement of the AI technology. The patient (or guardian, if applicable) and other individuals in attendance at the appointment consented to the use of AI, including the recording.         **This report has been created using voice recognition software. It may contain minor errors which are inherent in voice recognition technology.**

## 2025-03-10 LAB
FUNGUS SPEC CULT: NORMAL
FUNGUS SPEC FUNGUS STN: NORMAL

## 2025-03-12 ENCOUNTER — TELEPHONE (OUTPATIENT)
Dept: ENT CLINIC | Age: 50
End: 2025-03-12

## 2025-03-12 NOTE — TELEPHONE ENCOUNTER
Dr. Vanegas ordered a CT sinus for this patient his last office visit and requested it be scheduled in  a few weeks, I believe due to a medication regimen. Dr. Vanegas also requested the patient follow up with him in office around 5/15/2025. I called and spoke with patient on 3/6/2025 and patient stated he would call us back to schedule because he needed to get back to a meeting. Patient has not yet called back so I attempted to contact him again today and left a message requesting he call our office to schedule.

## 2025-03-18 NOTE — TELEPHONE ENCOUNTER
I spoke with patient today and he is now scheduled for his CT scan on 3/21/2025 and will follow up with Dr. Vanegas on 5/12/2025.

## 2025-03-21 ENCOUNTER — HOSPITAL ENCOUNTER (OUTPATIENT)
Dept: CT IMAGING | Age: 50
Discharge: HOME | End: 2025-03-21
Attending: OTOLARYNGOLOGY
Payer: COMMERCIAL

## 2025-03-21 DIAGNOSIS — J32.0 CHRONIC MAXILLARY SINUSITIS: ICD-10-CM

## 2025-03-21 DIAGNOSIS — Z98.890 STATUS POST FUNCTIONAL ENDOSCOPIC SINUS SURGERY (FESS): ICD-10-CM

## 2025-03-21 DIAGNOSIS — J34.1 MUCOUS RETENTION CYST OF MAXILLARY SINUS: ICD-10-CM

## 2025-03-21 DIAGNOSIS — J34.89 REFRACTORY OBSTRUCTION OF NASAL AIRWAY: ICD-10-CM

## 2025-03-21 PROCEDURE — 70486 CT MAXILLOFACIAL W/O DYE: CPT

## 2025-04-09 ENCOUNTER — PATIENT MESSAGE (OUTPATIENT)
Dept: ENT CLINIC | Age: 50
End: 2025-04-09

## 2025-04-09 NOTE — TELEPHONE ENCOUNTER
PLAN:  Physical Therapy: none  Check out Neel Keith online,  based in [place]. Gentle short purposeful movement exercises to build into your day. We[employer], Snoobe, JOSEPH ohara and Lan are all free. He has his own website and an gloria.   Clinical Health Psychologist to address issues of relaxation, behavioral change, coping style, and other factors important to improvement: not at present time  Diagnostic Studies: I need to review your outside records, you signed release of information for [facility] today  Medication Management:   START gabapentin 300mg Take 300mg at bedtime for 7 days, then take 300mg twice daily for 7 days, then take 300mg three times daily for 7 days, then take 300mg in the morning and afternoon and 600mg (2 capsules) at bedtime. If side effects, reduce to last tolerable dosage.   I will reach out to your Primary Care Provider and recommend a taper from oxycodone by 5-10 mg/day every month until off. You must be off of oxycodone in order to get on the transplant list.   Further procedures recommended: will determine in the future  Acupuncture: I okay with this  Urine toxicology screen today: none, I am not taking over your opiate medication   Recommendations/follow-up for PCP:  see above for recommended oxycodone taper  Release of information: signed MIGUE for [facility] in [place]  Follow up: 1 month for video visit.     ----------------------------------------------------------------  Clinic Number:  [phone]   Call with any questions about your care and for scheduling assistance.   Calls are returned Monday through Friday between 8 AM and 4:30 PM. We usually get back to you within 2 business days depending on the issue/request.    If we are prescribing your medications:  For opioid medication refills, call the clinic or send a [employer] message 7 days in advance.  Please include:  Name of requested medication  Name of the pharmacy.  For non-opioid medications, call your pharmacy  Please advise   directly to request a refill. Please allow 3-4 days to be processed.   Per [place] State Law:  All controlled substance prescriptions must be filled within 30 days of being written.    For those controlled substances allowing refills, pickup must occur within 30 days of last fill.      We believe regular attendance is key to your success in our program!    Any time you are unable to keep your appointment we ask that you call us at least 24 hours in advance to cancel.This will allow us to offer the appointment time to another patient.   Multiple missed appointments may lead to dismissal from the clinic.

## 2025-05-05 DIAGNOSIS — G47.00 INSOMNIA, UNSPECIFIED TYPE: ICD-10-CM

## 2025-05-05 DIAGNOSIS — K21.9 GASTROESOPHAGEAL REFLUX DISEASE, UNSPECIFIED WHETHER ESOPHAGITIS PRESENT: ICD-10-CM

## 2025-05-05 RX ORDER — ZOLPIDEM TARTRATE 10 MG/1
10 TABLET ORAL NIGHTLY PRN
Qty: 30 TABLET | Refills: 5 | Status: SHIPPED | OUTPATIENT
Start: 2025-05-05 | End: 2025-11-01

## 2025-05-05 RX ORDER — PANTOPRAZOLE SODIUM 40 MG/1
40 TABLET, DELAYED RELEASE ORAL 2 TIMES DAILY
Qty: 90 TABLET | Refills: 3 | Status: SHIPPED | OUTPATIENT
Start: 2025-05-05

## 2025-05-05 NOTE — TELEPHONE ENCOUNTER
LOV 11/4/24, NEXT 5/8/25    REQUESTED AMBIEN REFILL AS WELL, PT WILL NEED SEEN AT HIS 5/8/25 APPT FOR REFILLS

## 2025-05-06 SDOH — ECONOMIC STABILITY: INCOME INSECURITY: IN THE LAST 12 MONTHS, WAS THERE A TIME WHEN YOU WERE NOT ABLE TO PAY THE MORTGAGE OR RENT ON TIME?: NO

## 2025-05-06 SDOH — ECONOMIC STABILITY: FOOD INSECURITY: WITHIN THE PAST 12 MONTHS, YOU WORRIED THAT YOUR FOOD WOULD RUN OUT BEFORE YOU GOT MONEY TO BUY MORE.: NEVER TRUE

## 2025-05-06 SDOH — ECONOMIC STABILITY: FOOD INSECURITY: WITHIN THE PAST 12 MONTHS, THE FOOD YOU BOUGHT JUST DIDN'T LAST AND YOU DIDN'T HAVE MONEY TO GET MORE.: NEVER TRUE

## 2025-05-06 ASSESSMENT — PATIENT HEALTH QUESTIONNAIRE - PHQ9
SUM OF ALL RESPONSES TO PHQ QUESTIONS 1-9: 0
SUM OF ALL RESPONSES TO PHQ QUESTIONS 1-9: 0
2. FEELING DOWN, DEPRESSED OR HOPELESS: NOT AT ALL
SUM OF ALL RESPONSES TO PHQ QUESTIONS 1-9: 0
1. LITTLE INTEREST OR PLEASURE IN DOING THINGS: NOT AT ALL
2. FEELING DOWN, DEPRESSED OR HOPELESS: NOT AT ALL
SUM OF ALL RESPONSES TO PHQ QUESTIONS 1-9: 0
1. LITTLE INTEREST OR PLEASURE IN DOING THINGS: NOT AT ALL
SUM OF ALL RESPONSES TO PHQ9 QUESTIONS 1 & 2: 0

## 2025-05-08 ENCOUNTER — OFFICE VISIT (OUTPATIENT)
Dept: FAMILY MEDICINE CLINIC | Age: 50
End: 2025-05-08
Payer: COMMERCIAL

## 2025-05-08 VITALS
DIASTOLIC BLOOD PRESSURE: 86 MMHG | SYSTOLIC BLOOD PRESSURE: 132 MMHG | HEIGHT: 69 IN | BODY MASS INDEX: 30.2 KG/M2 | WEIGHT: 203.9 LBS | RESPIRATION RATE: 14 BRPM | HEART RATE: 62 BPM

## 2025-05-08 DIAGNOSIS — I10 PRIMARY HYPERTENSION: Primary | ICD-10-CM

## 2025-05-08 DIAGNOSIS — E78.2 MIXED HYPERLIPIDEMIA: ICD-10-CM

## 2025-05-08 DIAGNOSIS — E03.9 HYPOTHYROIDISM, UNSPECIFIED TYPE: ICD-10-CM

## 2025-05-08 DIAGNOSIS — Z12.5 SCREENING PSA (PROSTATE SPECIFIC ANTIGEN): ICD-10-CM

## 2025-05-08 PROCEDURE — 3079F DIAST BP 80-89 MM HG: CPT | Performed by: FAMILY MEDICINE

## 2025-05-08 PROCEDURE — 99213 OFFICE O/P EST LOW 20 MIN: CPT | Performed by: FAMILY MEDICINE

## 2025-05-08 PROCEDURE — 3074F SYST BP LT 130 MM HG: CPT | Performed by: FAMILY MEDICINE

## 2025-05-08 PROCEDURE — 1036F TOBACCO NON-USER: CPT | Performed by: FAMILY MEDICINE

## 2025-05-08 PROCEDURE — G8427 DOCREV CUR MEDS BY ELIG CLIN: HCPCS | Performed by: FAMILY MEDICINE

## 2025-05-08 PROCEDURE — G8417 CALC BMI ABV UP PARAM F/U: HCPCS | Performed by: FAMILY MEDICINE

## 2025-05-12 ENCOUNTER — OFFICE VISIT (OUTPATIENT)
Dept: ENT CLINIC | Age: 50
End: 2025-05-12
Payer: COMMERCIAL

## 2025-05-12 VITALS
WEIGHT: 199.9 LBS | BODY MASS INDEX: 29.61 KG/M2 | DIASTOLIC BLOOD PRESSURE: 70 MMHG | TEMPERATURE: 99.1 F | HEIGHT: 69 IN | SYSTOLIC BLOOD PRESSURE: 122 MMHG | HEART RATE: 80 BPM | RESPIRATION RATE: 18 BRPM | OXYGEN SATURATION: 97 %

## 2025-05-12 DIAGNOSIS — J32.0 CHRONIC MAXILLARY SINUSITIS: ICD-10-CM

## 2025-05-12 DIAGNOSIS — Z98.890 STATUS POST FUNCTIONAL ENDOSCOPIC SINUS SURGERY (FESS): Primary | ICD-10-CM

## 2025-05-12 DIAGNOSIS — J34.1 MUCOUS RETENTION CYST OF MAXILLARY SINUS: ICD-10-CM

## 2025-05-12 PROCEDURE — G8417 CALC BMI ABV UP PARAM F/U: HCPCS | Performed by: OTOLARYNGOLOGY

## 2025-05-12 PROCEDURE — 99213 OFFICE O/P EST LOW 20 MIN: CPT | Performed by: OTOLARYNGOLOGY

## 2025-05-12 PROCEDURE — 1036F TOBACCO NON-USER: CPT | Performed by: OTOLARYNGOLOGY

## 2025-05-12 PROCEDURE — 3078F DIAST BP <80 MM HG: CPT | Performed by: OTOLARYNGOLOGY

## 2025-05-12 PROCEDURE — G8427 DOCREV CUR MEDS BY ELIG CLIN: HCPCS | Performed by: OTOLARYNGOLOGY

## 2025-05-12 PROCEDURE — 3074F SYST BP LT 130 MM HG: CPT | Performed by: OTOLARYNGOLOGY

## 2025-05-12 NOTE — PROGRESS NOTES
[facility] [place] [facility]  [facility]SE [facility]  [address]  [address]  [facility] [address]  Dept: [phone]  Dept Fax: [phone]  Loc: [phone]    Moises Paez is a 50 y.o. male who was referred by No ref. provider found for:  Chief Complaint   Patient presents with    Follow-up     Patient is here for a follow up for review CT Sinus 03/21/25. Patient states he is still having pain on the R side of the cheek. Under nose on the R side feels tingly and light pain. The headaches have diminished. He said he still can't breathe well on the R side.        HPI:       History of Present Illness  Moises Paez is a 50 y.o. male who presents for evaluation of a cyst in the cheek sinus status post endoscopic and trans Rai Gasper ablation of a large cheek cyst.  Preoperatively the patient had symptoms that extended through the entirety of his trigeminal nerve system including his upper face and anterior upper skull in his cheek back to his or mandibular joint and his jaw with some radiation down to his neck.  He now is clear that his symptom profile at its worst is exclusively just his cheek and at times all the way back to his joint but not down to his jaw and not up on his eye and upper face and calvarium.    He reports experiencing pain along his jawline, accompanied by a sensation of congestion on the same side. Additionally, he notes a tingling, numb sensation upon palpation of his cheek exclusively on the side of his cheek cyst that has recurred. Post-surgery, he experienced a period of relief from pain, which subsequently returned following the first postoperative cleaning. It has been 3 months since the surgery, and he perceives a significant regrowth of the cyst. He expresses reluctance to undergo further procedures due to the recurrent nature of the cyst. He manages his pain with 2 extra strength Tylenol, which provides temporary relief for about 2 hours. He is

## 2025-05-12 NOTE — PROGRESS NOTES
Pt here for 1 yr fu.    Pt did not have med list- went over verbally.     Pt denies any cardiac symptoms at this time.   
Your current Orthopaedic problem we are working together to treat is pain.    IMAGING:    You have been recommended to undergo an MRI of your cervical and lumbar spine to help us better understand and treat your symptoms.     Central Scheduling -Imaging  [phone]   We will contact you via Livewell or phone with the results of your images.     Office hours are 8:00 am to 5:00 pm Monday through Friday. If it is urgent that you speak with someone outside of these hours, our [facility] Call Center will be able to assist you.   If you have any questions or concerns prior to your next office visit, please call our Pain Line: [phone].     Locations:  [facility]  [address] [place] [address]    [facility]  [address] [place] [address]    Thank you for choosing [facility]!  
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    No cardiac issues on exams.  Able to do > 4 METS.  Patient accepts the risk of the planned procedure and understands the possibility of gisel-operative cardiac event, including but not limited to MI, VT/VF, cardiac arrest, and death, and wishes to proceed with the procedure.  No further cardiac testing prior to upcoming surgery.  May hold Brilinta x 5 days    Assessment & Plan  1. Trigeminal Neuralgia--needing ENT surgery.  He reports right-sided facial pain and has been diagnosed with trigeminal neuralgia. A CT scan indicated that a cyst on the right side has grown from 2.2 cm to 2.9 cm. He is scheduled to see Dr. Zee for further evaluation and potential surgery. He is advised to discontinue Brilinta for 5 days prior to surgery and continue aspirin if possible. Surgical evaluation was provided as noted above.    2. Hypertension.  His blood pressure is currently lower than expected but remains within acceptable limits. He reports no lightheadedness or dizziness. He should continue his current medication regimen.    3. Health Maintenance.  His echocardiogram from January 2024 showed normal cardiac function. He has had no recurrent issues on his EKGs, and his cardiac valves are functioning well. The current medication regimen, including Brilinta, appears to be effective. LDL within goal on Repatha/fibrates due to statin-intolerance.         Disposition:  1 year         Discussed symptoms needing emergency care or those which require further medical attention.   Discussed diet/exercise/BP/weight loss/health lifestyle choices/lipids; the patient

## 2025-06-03 ENCOUNTER — TELEPHONE (OUTPATIENT)
Dept: ENT CLINIC | Age: 50
End: 2025-06-03

## 2025-06-03 NOTE — TELEPHONE ENCOUNTER
Done.  Please make sure that he is booked with somebody that is one of the \"Rhinologist\" within the [facility] Department of Otolaryngology.  Thank you  PFC

## 2025-06-03 NOTE — TELEPHONE ENCOUNTER
Do you want to place a referral to Rhinologist or are you waiting until patient follows up with you in three months to place the referral?    Please advise

## 2025-06-11 NOTE — TELEPHONE ENCOUNTER
Contacted [facility] Referrals spoke with Mandi and she stated that they have attempted to contact patient to schedule an appointment with no success.

## (undated) DEVICE — ENTACT SEPTAL STAPLER 3 PACK: Brand: ENT SINUS

## (undated) DEVICE — HYPODERMIC SAFETY NEEDLE: Brand: MAGELLAN

## (undated) DEVICE — SUTURE PLN GUT SZ 4-0 L18IN ABSRB YELLOWISH TAN L13MM SC-1 1828H

## (undated) DEVICE — ENT: Brand: MEDLINE INDUSTRIES, INC.

## (undated) DEVICE — DUAL LUMEN STOMACH TUBE: Brand: SALEM SUMP

## (undated) DEVICE — KIT,ANTI FOG,W/SPONGE & FLUID,SOFT PACK: Brand: MEDLINE

## (undated) DEVICE — DRAPE,INSTRUMENT,MAGNETIC,10X16: Brand: MEDLINE

## (undated) DEVICE — TURBINATOR WAND: Brand: COBLATION

## (undated) DEVICE — AGENT HEMOSTATIC SURGIFLOW MATRIX KIT W/THROMBIN

## (undated) DEVICE — SUTURE PROL SZ 2-0 L18IN NONABSORBABLE BLU FS L26MM 3/8 CIR 8685H

## (undated) DEVICE — INSTRUMENT TRACKER 9733533XOM ENT 1PK

## (undated) DEVICE — YANKAUER,FLEXIBLE HANDLE,REGLR CAPACITY: Brand: MEDLINE INDUSTRIES, INC.

## (undated) DEVICE — SPONGE,NEURO,0.5"X3",XR,STRL,LF,10/PK: Brand: MEDLINE

## (undated) DEVICE — BLADE 1883517 RAD120 3PK MAX SINUS CVD: Brand: RAD

## (undated) DEVICE — ELECTRODE ELECSURG NDL 2.8 INX7.2 CM COAT INSUL EDGE

## (undated) DEVICE — BLADE ES L4IN INSUL EDGE

## (undated) DEVICE — COAGULATOR SUCT 8FR L6IN HNDL FOR ENT PROC

## (undated) DEVICE — SPLINT 1524050 5PK PAIR DOYLE II AIRWAY: Brand: DOYLE II ™

## (undated) DEVICE — ENT PACK: Brand: MEDLINE INDUSTRIES, INC.

## (undated) DEVICE — BLADE 1884006EM RAD40 4MM M4 ROTATE ROHS: Brand: FUSION®

## (undated) DEVICE — ELECTRODE NDL 2.8IN COAT VALLEYLAB

## (undated) DEVICE — TUBING 1895522 5PK STRAIGHTSHOT TO XPS: Brand: STRAIGHTSHOT®

## (undated) DEVICE — SILICONE DUAL LUMEN STOMACH TUBE,ANTI-REFLUX VALVE AND RADIOPAQUE LINE: Brand: SALEM SUMP

## (undated) DEVICE — BLADE,CARBON-STEEL,15,STRL,DISPOSABLE,TB: Brand: MEDLINE

## (undated) DEVICE — APPLICATOR MEDICATED 10.5 CC SOLUTION CLR STRL CHLORAPREP

## (undated) DEVICE — SYRINGE MED 10ML LUERLOCK TIP W/O SFTY DISP

## (undated) DEVICE — STANDARD HYPODERMIC NEEDLE,POLYPROPYLENE HUB: Brand: MONOJECT

## (undated) DEVICE — PATIENT TRACKER 9734887XOM NON-INVASIVE

## (undated) DEVICE — PACK-MAJOR

## (undated) DEVICE — SYRINGE IRRIG 60ML SFT PLIABLE BLB EZ TO GRP 1 HND USE W/

## (undated) DEVICE — SUTURE MONOCRYL SZ 4 0 L18IN ABSRB UD P 3 3 8 CIR REV CUT NDL MCP494G

## (undated) DEVICE — SUTURE MONOCRYL SZ 3-0 L27IN ABSRB UD L17MM RB-1 1/2 CIR Y215H

## (undated) DEVICE — BLADE 1884080EM TRICUT 4MMX13CM M4 ROHS: Brand: FUSION®

## (undated) DEVICE — SUTURE CHROMIC GUT SZ 4-0 L27IN ABSRB BRN L26MM SH 1/2 CIR G121H

## (undated) DEVICE — SPONGE,NEURO,0.5"X0.5",XR,STRL,10/PK: Brand: MEDLINE

## (undated) DEVICE — PENCIL SMK EVAC ALL IN 1 DSGN ENH VISIBILITY IMPROVED AIR

## (undated) DEVICE — GLOVE SURG SZ 7.5 L11.73IN FNGR THK9.8MIL STRW LTX POLYMER

## (undated) DEVICE — LUKI TUBE SPECIMEN COLLECTION DEVICE,20 ML: Brand: ARGYLE